# Patient Record
Sex: MALE | Race: WHITE | Employment: OTHER | URBAN - METROPOLITAN AREA
[De-identification: names, ages, dates, MRNs, and addresses within clinical notes are randomized per-mention and may not be internally consistent; named-entity substitution may affect disease eponyms.]

---

## 2017-08-14 ENCOUNTER — OFFICE VISIT (OUTPATIENT)
Dept: FAMILY MEDICINE CLINIC | Age: 72
End: 2017-08-14

## 2017-08-14 ENCOUNTER — HOSPITAL ENCOUNTER (OUTPATIENT)
Dept: LAB | Age: 72
Discharge: HOME | End: 2017-08-14
Payer: MEDICARE

## 2017-08-14 VITALS
SYSTOLIC BLOOD PRESSURE: 108 MMHG | OXYGEN SATURATION: 95 % | HEART RATE: 74 BPM | BODY MASS INDEX: 30.32 KG/M2 | TEMPERATURE: 98 F | HEIGHT: 76 IN | RESPIRATION RATE: 12 BRPM | DIASTOLIC BLOOD PRESSURE: 75 MMHG | WEIGHT: 249 LBS

## 2017-08-14 DIAGNOSIS — I50.22 CHRONIC SYSTOLIC CONGESTIVE HEART FAILURE (HCC): ICD-10-CM

## 2017-08-14 DIAGNOSIS — Z01.818 PRE-OP EXAM: Primary | ICD-10-CM

## 2017-08-14 DIAGNOSIS — I45.9 HEART BLOCK: ICD-10-CM

## 2017-08-14 DIAGNOSIS — Z95.0 PACEMAKER: ICD-10-CM

## 2017-08-14 DIAGNOSIS — I10 ESSENTIAL HYPERTENSION: ICD-10-CM

## 2017-08-14 PROCEDURE — 80053 COMPREHEN METABOLIC PANEL: CPT

## 2017-08-14 PROCEDURE — 85025 COMPLETE CBC W/AUTO DIFF WBC: CPT

## 2017-08-14 NOTE — PROGRESS NOTES
HPI  Teddy Langley is a 67 y.o. male who presents for preop exam.  Typically gets all of his medical care at the [place] but his primary care provider is out of town and he needs a preop examination for cataract surgery. The last time he was seen in the [facility] system was 2010 when he had a myocardial infarction that did not require stent placement. Since then he has been diagnosed with CHF with a ejection fraction of 35%. Has a heart block requiring a pacemaker. He has frequent follow-up with his PCP and cardiology. He has been given a good report although I do not have these records from the [place]. He has reasonably good exercise tolerance. He can walk around stores without difficulty. Can walk up a flight of steps without difficulty. Does not have chest pain or shortness of breath. His last surgery was a hernia repair 2-1/2 years ago. He tolerated anesthesia well    PMHx:  No past medical history on file. Meds:   Current Outpatient Prescriptions   Medication Sig Dispense Refill    metoprolol (LOPRESSOR) 50 mg tablet Take 50 mg by mouth two (2) times a day. Indications: MYOCARDIAL INFARCTION      lisinopril (PRINIVIL, ZESTRIL) 40 mg tablet Take 40 mg by mouth daily. Indications: MYOCARDIAL INFARCTION      niacin SR (NIASPAN) 500 mg tablet Take 2,000 mg by mouth two (2) times daily (with meals). Indications: HYPERLIPIDEMIA      aspirin (ASPIRIN) 325 mg tablet Take 750 mg by mouth daily. Indications: MYOCARDIAL INFARCTION      furosemide (LASIX) 40 mg tablet Take 40 mg by mouth daily. Indications: EDEMA         Allergies: Allergies   Allergen Reactions    Celery Other (comments)     Mouth burns    Erythromycin Other (comments)     cramping       Smoker:  History   Smoking Status    Not on file   Smokeless Tobacco    Not on file       ETOH:   History   Alcohol use Not on file       FH: No family history on file. ROS:   As listed in HPI.  In addition:  Constitutional:   No headache, fever, fatigue, weight loss or weight gain      Cardiac:    No chest pain      Resp:   No cough or shortness of breath      Neuro   No loss of consciousness, dizziness, seizures      Physical Exam:  Blood pressure 108/75, pulse 74, temperature 98 °F (36.7 °C), resp. rate 12, height 6' 4\" (1.93 m), weight 249 lb (112.9 kg), SpO2 95 %. GEN: No apparent distress. Alert and oriented and responds to all questions appropriately. NEUROLOGIC:  No focal neurologic deficits. Strength and sensation grossly intact. Coordination and gait grossly intact. EXT: Well perfused. No edema. SKIN: No obvious rashes. Lungs are clear to auscultation bilaterally  CV regular rate and rhythm no murmur no bruit  HEENT unremarkable         Assessment and Plan     Preop for cataract surgery  Do not have records from the [place] but patient is a reasonably good historian. Hypertension is well controlled    He has CHF but has good function  No recent cardiac events, no history of stent placement    He is capable of greater than 4 METs  Should be low risk for surgery    CBC, CMP reviewed. Labs are within normal limits. These are not fasting labs so blood sugar is acceptable      ICD-10-CM ICD-9-CM    1. Pre-op exam K18.964 Q98.31 METABOLIC PANEL, COMPREHENSIVE      CBC WITH AUTOMATED DIFF   2. Chronic systolic congestive heart failure (HCC) I50.22 428.22      428.0    3. Heart block I45.9 426.9    4. Pacemaker Z95.0 V45.01    5. Essential hypertension I10 401.9        AVS given.  Pt expressed understanding of instructions

## 2017-08-15 LAB
ALBUMIN SERPL-MCNC: 3.6 G/DL (ref 3.5–4.8)
ALBUMIN/GLOB SERPL: 1.2 {RATIO} (ref 1.2–2.2)
ALP SERPL-CCNC: 107 IU/L (ref 39–117)
ALT SERPL-CCNC: 31 IU/L (ref 0–44)
AST SERPL-CCNC: 28 IU/L (ref 0–40)
BASOPHILS # BLD AUTO: 0 X10E3/UL (ref 0–0.2)
BASOPHILS NFR BLD AUTO: 1 %
BILIRUB SERPL-MCNC: 0.7 MG/DL (ref 0–1.2)
BUN SERPL-MCNC: 16 MG/DL (ref 8–27)
BUN/CREAT SERPL: 17 (ref 10–24)
CALCIUM SERPL-MCNC: 9.3 MG/DL (ref 8.6–10.2)
CHLORIDE SERPL-SCNC: 102 MMOL/L (ref 96–106)
CO2 SERPL-SCNC: 28 MMOL/L (ref 18–29)
CREAT SERPL-MCNC: 0.92 MG/DL (ref 0.76–1.27)
EOSINOPHIL # BLD AUTO: 0.4 X10E3/UL (ref 0–0.4)
EOSINOPHIL NFR BLD AUTO: 6 %
ERYTHROCYTE [DISTWIDTH] IN BLOOD BY AUTOMATED COUNT: 13.5 % (ref 12.3–15.4)
GLOBULIN SER CALC-MCNC: 2.9 G/DL (ref 1.5–4.5)
GLUCOSE SERPL-MCNC: 117 MG/DL (ref 65–99)
HCT VFR BLD AUTO: 44.6 % (ref 37.5–51)
HGB BLD-MCNC: 15 G/DL (ref 12.6–17.7)
IMM GRANULOCYTES # BLD: 0 X10E3/UL (ref 0–0.1)
IMM GRANULOCYTES NFR BLD: 0 %
LYMPHOCYTES # BLD AUTO: 1.9 X10E3/UL (ref 0.7–3.1)
LYMPHOCYTES NFR BLD AUTO: 29 %
MCH RBC QN AUTO: 33.1 PG (ref 26.6–33)
MCHC RBC AUTO-ENTMCNC: 33.6 G/DL (ref 31.5–35.7)
MCV RBC AUTO: 99 FL (ref 79–97)
MONOCYTES # BLD AUTO: 0.5 X10E3/UL (ref 0.1–0.9)
MONOCYTES NFR BLD AUTO: 8 %
NEUTROPHILS # BLD AUTO: 3.6 X10E3/UL (ref 1.4–7)
NEUTROPHILS NFR BLD AUTO: 56 %
PLATELET # BLD AUTO: 178 X10E3/UL (ref 150–379)
POTASSIUM SERPL-SCNC: 4.4 MMOL/L (ref 3.5–5.2)
PROT SERPL-MCNC: 6.5 G/DL (ref 6–8.5)
RBC # BLD AUTO: 4.53 X10E6/UL (ref 4.14–5.8)
SODIUM SERPL-SCNC: 144 MMOL/L (ref 134–144)
WBC # BLD AUTO: 6.4 X10E3/UL (ref 3.4–10.8)

## 2021-03-01 ENCOUNTER — OFFICE VISIT (OUTPATIENT)
Dept: FAMILY MEDICINE CLINIC | Age: 76
End: 2021-03-01
Payer: MEDICARE

## 2021-03-01 VITALS
OXYGEN SATURATION: 94 % | HEART RATE: 70 BPM | BODY MASS INDEX: 27.54 KG/M2 | DIASTOLIC BLOOD PRESSURE: 64 MMHG | WEIGHT: 238 LBS | TEMPERATURE: 97.5 F | RESPIRATION RATE: 18 BRPM | SYSTOLIC BLOOD PRESSURE: 100 MMHG | HEIGHT: 78 IN

## 2021-03-01 DIAGNOSIS — N40.1 BENIGN PROSTATIC HYPERPLASIA WITH URINARY OBSTRUCTION: Primary | ICD-10-CM

## 2021-03-01 DIAGNOSIS — N13.8 BENIGN PROSTATIC HYPERPLASIA WITH URINARY OBSTRUCTION: Primary | ICD-10-CM

## 2021-03-01 DIAGNOSIS — G89.29 CHRONIC MIDLINE LOW BACK PAIN, UNSPECIFIED WHETHER SCIATICA PRESENT: ICD-10-CM

## 2021-03-01 DIAGNOSIS — M54.50 CHRONIC MIDLINE LOW BACK PAIN, UNSPECIFIED WHETHER SCIATICA PRESENT: ICD-10-CM

## 2021-03-01 DIAGNOSIS — R33.9 URINARY RETENTION: ICD-10-CM

## 2021-03-01 LAB
BILIRUB UR QL STRIP: NEGATIVE
GLUCOSE UR-MCNC: NEGATIVE MG/DL
KETONES P FAST UR STRIP-MCNC: NORMAL MG/DL
PH UR STRIP: 5.5 [PH] (ref 4.6–8)
PROT UR QL STRIP: NEGATIVE
SP GR UR STRIP: 1.02 (ref 1–1.03)
UA UROBILINOGEN AMB POC: NORMAL (ref 0.2–1)
URINALYSIS CLARITY POC: NORMAL
URINALYSIS COLOR POC: YELLOW
URINE BLOOD POC: NEGATIVE
URINE LEUKOCYTES POC: NORMAL
URINE NITRITES POC: POSITIVE

## 2021-03-01 PROCEDURE — 99203 OFFICE O/P NEW LOW 30 MIN: CPT | Performed by: FAMILY MEDICINE

## 2021-03-01 PROCEDURE — 81003 URINALYSIS AUTO W/O SCOPE: CPT | Performed by: FAMILY MEDICINE

## 2021-03-01 PROCEDURE — G0463 HOSPITAL OUTPT CLINIC VISIT: HCPCS | Performed by: FAMILY MEDICINE

## 2021-03-01 RX ORDER — GABAPENTIN 300 MG/1
300 CAPSULE ORAL DAILY
COMMUNITY
Start: 2021-03-01 | End: 2021-11-29

## 2021-03-01 RX ORDER — HYDROCODONE BITARTRATE AND ACETAMINOPHEN 5; 325 MG/1; MG/1
1 TABLET ORAL
Refills: 0 | COMMUNITY
Start: 2021-03-01 | End: 2021-12-02

## 2021-03-01 NOTE — PROGRESS NOTES
Julito Pena is a 68 y.o. male  Chief Complaint   Patient presents with    Follow-up    Bladder Infection     has staright cath, frequent infections     1. Have you been to the ER, urgent care clinic since your last visit? Hospitalized since your last visit?no    2. Have you seen or consulted any other health care providers outside of the [address] [place] [address] since your last visit? Include any pap smears or colon screening.  No  Visit Vitals  /64 (BP 1 Location: Right arm, BP Patient Position: At rest, BP Cuff Size: Adult)   Pulse 70   Temp 97.5 °F (36.4 °C) (Skin)   Resp 18   Ht 6' 6\" (1.981 m)   Wt 238 lb (108 kg)   SpO2 94%   BMI 27.50 kg/m²     Health Maintenance   Topic Date Due    Hepatitis C Screening  1945    COVID-19 Vaccine (1 of 2) 01/27/1961    DTaP/Tdap/Td series (1 - Tdap) 01/27/1966    Shingrix Vaccine Age 50> (1 of 2) 01/27/1995    GLAUCOMA SCREENING Q2Y  01/27/2010    Pneumococcal 65+ years (1 of 1 - PPSV23) 01/27/2010    Flu Vaccine (1) 09/01/2020    Medicare Yearly Exam  02/15/2021

## 2021-03-01 NOTE — PROGRESS NOTES
Chief Complaint   Patient presents with    Follow-up    Bladder Infection     has staright cath, frequent infections     Pt reports that he has chronic back pain, is on hydrocodone chronically. Pt brought medication list from the [place], was filled on 2/24/21    Pt has an ICD, pt has a 20% Ejection fraction. Pt gets 60 pills per month from Dr. Stephen Conklin. Pt uses a straight catheter, has intermittent urinary tract infections. Pt had a procedure to \"roto-rooter\" the prostate, pt reports that he has to cath due to enlarged prostate. Pt has been using a straight catheter for 3-4 years. Pt does not get any vaccines, declined COVID vaccine when it was offered by another provider. Catheter is: [address] [facility], Male, FR 14/4.7mm  Subjective: (As above and below)     Chief Complaint   Patient presents with    Follow-up    Bladder Infection     has staright cath, frequent infections     he is a 68y.o. year old male who presents for evaluation. Reviewed PmHx, RxHx, FmHx, SocHx, AllgHx and updated in chart. Review of Systems - negative except as listed above    Objective:     Vitals:    03/01/21 1434   BP: 100/64   Pulse: 70   Resp: 18   Temp: 97.5 °F (36.4 °C)   TempSrc: Skin   SpO2: 94%   Weight: 238 lb (108 kg)   Height: 6' 6\" (1.981 m)     Physical Examination: General appearance - alert, well appearing, and in no distress  Mental status - normal mood, behavior, speech, dress, motor activity, and thought processes  Mouth - mucous membranes moist, pharynx normal without lesions  Chest - clear to auscultation, no wheezes, rales or rhonchi, symmetric air entry  Heart - normal rate, regular rhythm, normal S1, S2, no murmurs, rubs, clicks or gallops  Musculoskeletal - walks with a cane  Extremities - peripheral pulses normal, no pedal edema, no clubbing or cyanosis    Assessment/ Plan:   1.  Benign prostatic hyperplasia with urinary obstruction  -refer to urology  - REFERRAL TO UROLOGY  - AMB POC URINALYSIS DIP STICK AUTO W/O MICRO    2. Chronic midline low back pain, unspecified whether sciatica present  -records requested from the [place]  -controlled substance agreement signed  -UDS ordered  -advised that medication will not be refilled until records are received, will require justification, may refer to pain management  - gabapentin (NEURONTIN) 300 mg capsule; Take 1 Cap by mouth daily. Max Daily Amount: 300 mg.  - HYDROcodone-acetaminophen (NORCO) 5-325 mg per tablet; Take 1 Tab by mouth two (2) times daily as needed for Pain. Max Daily Amount: 2 Tabs.; Refill: 0       I have discussed the diagnosis with the patient and the intended plan as seen in the above orders. The patient has received an after-visit summary and questions were answered concerning future plans.      Medication Side Effects and Warnings were discussed with patient: yes  Patient Labs were reviewed: yes  Patient Past Records were reviewed:  yes    Bassam Brennan M.D.

## 2021-03-04 LAB — BACTERIA UR CULT: ABNORMAL

## 2021-03-05 RX ORDER — AMOXICILLIN AND CLAVULANATE POTASSIUM 875; 125 MG/1; MG/1
1 TABLET, FILM COATED ORAL EVERY 12 HOURS
Qty: 20 TAB | Refills: 0 | OUTPATIENT
Start: 2021-03-05 | End: 2021-03-15

## 2021-03-05 NOTE — PROGRESS NOTES
Tried calling pt and wife states she was currently on multiple phone calls and she would call us back.  I let her know its really important to call us back

## 2021-03-05 NOTE — PROGRESS NOTES
Urine culture shows significant bacterial growth, please start on antibiotic as ordered. Please inform and confirm that pt would like his prescription to go to [facility], if so it will need to be called in.

## 2021-03-05 NOTE — PROGRESS NOTES
Pt notified and voiced understanding, pt would like medication called into the [facility] in [place]

## 2021-03-07 LAB — DRUGS UR: NORMAL

## 2021-03-08 NOTE — PROGRESS NOTES
Unexpected results. Tramadol not on  nor reported to provider. Drug screen failed, pain medication will not be provided by this office.

## 2021-03-24 DIAGNOSIS — G89.29 CHRONIC MIDLINE LOW BACK PAIN, UNSPECIFIED WHETHER SCIATICA PRESENT: ICD-10-CM

## 2021-03-24 DIAGNOSIS — M54.50 CHRONIC MIDLINE LOW BACK PAIN, UNSPECIFIED WHETHER SCIATICA PRESENT: ICD-10-CM

## 2021-03-24 RX ORDER — HYDROCODONE BITARTRATE AND ACETAMINOPHEN 5; 325 MG/1; MG/1
1 TABLET ORAL
Refills: 0 | OUTPATIENT
Start: 2021-03-24

## 2021-03-24 NOTE — TELEPHONE ENCOUNTER
Pt called for a refill of his pain meds. Please send to [address]. Requested Prescriptions     Pending Prescriptions Disp Refills    HYDROcodone-acetaminophen (NORCO) 5-325 mg per tablet   0     Sig: Take 1 Tab by mouth two (2) times daily as needed for Pain. Max Daily Amount: 2 Tabs.

## 2021-03-24 NOTE — LETTER
3/24/2021 Mr. Lore rFances [address] [facility] [address] [facility] [id number] Dear Mr. Chiqui Nunez good relationship between physician and patient is essential for quality medical care. The physician/patient relationship depends upon mutual trust, respect, and rapport. Our relationship has reached a point where these criteria are no longer intact. you have an abnormal drug screen  For this reasons, I will no longer continue to serve as your physician and [id number] [facility] [place] must withdraw from your medical care and treatment effective this date. Although I have terminated our physicianpatient relationship, I will assist you with the transition of your health care to other providers, as follows: 
 
1. On-Going/Acute Care. I will provide on-going/acute care for you for a period of 30 days, but in no case later than 4/23/21. After this date, you may seek care with another physician or your local emergency room. 2. Referrals for Future Medical Care. As you may need medical attention in the future, I recommend that you promptly find another physician to care for you. You may require ongoing medical attention for any current or future medical conditions, including but not limited to the following: Congestive Heart Failure, Hypertension, Chronic Pain. You may contact your insurance company or the [employer] for a [employer] [place] [employer] of physicians who are accepting new patients 3. Medical Records. I will provide you or your new health care provider with a copy of your records upon your request.  Since your records are confidential, Ill require your written authorization to make them available to another physician. Enclosed is an authorization form. Please complete it and return it to me so that we may transition your care. I extend to you my best wishes for your future health. Sincerely, Regis Gibbons MD 
 
 Enclosure

## 2021-03-24 NOTE — TELEPHONE ENCOUNTER
Received request for pain medication refill. Called and spoke with patient regarding urine drug screen that showed tramadol. Tramadol was not listed on his  nor disclose during visit. Patient reports that this was an old prescription that he was using due to being out of his other pain medication. Advised patient that we did not have a record of it being prescribed in the past 12 months,  also shows that patient has been regularly receiving hydrocodone at 1 month intervals without any visible interruption. Advised patient that we would not be able to provide controlled substance at this time, patient expressed unhappiness with this decision, reports that he will speak with his provider at the [place] who has been currently prescribing his medications. I agreed with this decision.     Dismissal letter written

## 2021-11-29 ENCOUNTER — APPOINTMENT (OUTPATIENT)
Dept: GENERAL RADIOLOGY | Age: 76
DRG: 522 | End: 2021-11-29
Attending: EMERGENCY MEDICINE
Payer: OTHER GOVERNMENT

## 2021-11-29 ENCOUNTER — APPOINTMENT (OUTPATIENT)
Dept: CT IMAGING | Age: 76
DRG: 522 | End: 2021-11-29
Attending: EMERGENCY MEDICINE
Payer: OTHER GOVERNMENT

## 2021-11-29 ENCOUNTER — HOSPITAL ENCOUNTER (INPATIENT)
Age: 76
LOS: 3 days | Discharge: HOME | DRG: 522 | End: 2021-12-02
Attending: EMERGENCY MEDICINE | Admitting: INTERNAL MEDICINE
Payer: OTHER GOVERNMENT

## 2021-11-29 DIAGNOSIS — S72.002A CLOSED FRACTURE OF NECK OF LEFT FEMUR, INITIAL ENCOUNTER (HCC): Primary | ICD-10-CM

## 2021-11-29 PROBLEM — S72.009A HIP FRACTURE (HCC): Status: ACTIVE | Noted: 2021-11-29

## 2021-11-29 LAB
ABO + RH BLD: NORMAL
ANION GAP SERPL CALC-SCNC: 5 MMOL/L (ref 5–15)
APPEARANCE UR: ABNORMAL
BACTERIA URNS QL MICRO: ABNORMAL /HPF
BASOPHILS # BLD: 0.1 K/UL (ref 0–0.1)
BASOPHILS NFR BLD: 1 % (ref 0–1)
BILIRUB UR QL: NEGATIVE
BLOOD GROUP ANTIBODIES SERPL: NORMAL
BUN SERPL-MCNC: 21 MG/DL (ref 6–20)
BUN/CREAT SERPL: 20 (ref 12–20)
CALCIUM SERPL-MCNC: 9 MG/DL (ref 8.5–10.1)
CHLORIDE SERPL-SCNC: 104 MMOL/L (ref 97–108)
CO2 SERPL-SCNC: 29 MMOL/L (ref 21–32)
COLOR UR: ABNORMAL
CREAT SERPL-MCNC: 1.04 MG/DL (ref 0.7–1.3)
DIFFERENTIAL METHOD BLD: ABNORMAL
EOSINOPHIL # BLD: 0.4 K/UL (ref 0–0.4)
EOSINOPHIL NFR BLD: 4 % (ref 0–7)
EPITH CASTS URNS QL MICRO: ABNORMAL /LPF
ERYTHROCYTE [DISTWIDTH] IN BLOOD BY AUTOMATED COUNT: 13.9 % (ref 11.5–14.5)
GLUCOSE SERPL-MCNC: 111 MG/DL (ref 65–100)
GLUCOSE UR STRIP.AUTO-MCNC: NEGATIVE MG/DL
HCT VFR BLD AUTO: 40.3 % (ref 36.6–50.3)
HGB BLD-MCNC: 13.3 G/DL (ref 12.1–17)
HGB UR QL STRIP: NEGATIVE
IMM GRANULOCYTES # BLD AUTO: 0 K/UL (ref 0–0.04)
IMM GRANULOCYTES NFR BLD AUTO: 0 % (ref 0–0.5)
KETONES UR QL STRIP.AUTO: ABNORMAL MG/DL
LEUKOCYTE ESTERASE UR QL STRIP.AUTO: ABNORMAL
LYMPHOCYTES # BLD: 1.1 K/UL (ref 0.8–3.5)
LYMPHOCYTES NFR BLD: 12 % (ref 12–49)
MCH RBC QN AUTO: 32 PG (ref 26–34)
MCHC RBC AUTO-ENTMCNC: 33 G/DL (ref 30–36.5)
MCV RBC AUTO: 96.9 FL (ref 80–99)
MONOCYTES # BLD: 1.2 K/UL (ref 0–1)
MONOCYTES NFR BLD: 14 % (ref 5–13)
NEUTS SEG # BLD: 6.1 K/UL (ref 1.8–8)
NEUTS SEG NFR BLD: 69 % (ref 32–75)
NITRITE UR QL STRIP.AUTO: POSITIVE
NRBC # BLD: 0 K/UL (ref 0–0.01)
NRBC BLD-RTO: 0 PER 100 WBC
PH UR STRIP: 5.5 [PH] (ref 5–8)
PLATELET # BLD AUTO: 168 K/UL (ref 150–400)
PMV BLD AUTO: 10.7 FL (ref 8.9–12.9)
POTASSIUM SERPL-SCNC: 3.4 MMOL/L (ref 3.5–5.1)
PROT UR STRIP-MCNC: 100 MG/DL
RBC # BLD AUTO: 4.16 M/UL (ref 4.1–5.7)
RBC #/AREA URNS HPF: ABNORMAL /HPF (ref 0–5)
SODIUM SERPL-SCNC: 138 MMOL/L (ref 136–145)
SP GR UR REFRACTOMETRY: >1.03 (ref 1–1.03)
SPECIMEN EXP DATE BLD: NORMAL
UA: UC IF INDICATED,UAUC: ABNORMAL
UROBILINOGEN UR QL STRIP.AUTO: 1 EU/DL (ref 0.2–1)
WBC # BLD AUTO: 8.9 K/UL (ref 4.1–11.1)
WBC URNS QL MICRO: ABNORMAL /HPF (ref 0–4)

## 2021-11-29 PROCEDURE — 73502 X-RAY EXAM HIP UNI 2-3 VIEWS: CPT

## 2021-11-29 PROCEDURE — 74011250637 HC RX REV CODE- 250/637: Performed by: EMERGENCY MEDICINE

## 2021-11-29 PROCEDURE — 99285 EMERGENCY DEPT VISIT HI MDM: CPT

## 2021-11-29 PROCEDURE — 80048 BASIC METABOLIC PNL TOTAL CA: CPT

## 2021-11-29 PROCEDURE — 87186 SC STD MICRODIL/AGAR DIL: CPT

## 2021-11-29 PROCEDURE — 74011250636 HC RX REV CODE- 250/636: Performed by: PHYSICIAN ASSISTANT

## 2021-11-29 PROCEDURE — 71101 X-RAY EXAM UNILAT RIBS/CHEST: CPT

## 2021-11-29 PROCEDURE — 85025 COMPLETE CBC W/AUTO DIFF WBC: CPT

## 2021-11-29 PROCEDURE — 36415 COLL VENOUS BLD VENIPUNCTURE: CPT

## 2021-11-29 PROCEDURE — 86900 BLOOD TYPING SEROLOGIC ABO: CPT

## 2021-11-29 PROCEDURE — 74011250636 HC RX REV CODE- 250/636: Performed by: EMERGENCY MEDICINE

## 2021-11-29 PROCEDURE — 87086 URINE CULTURE/COLONY COUNT: CPT

## 2021-11-29 PROCEDURE — 74011250637 HC RX REV CODE- 250/637: Performed by: INTERNAL MEDICINE

## 2021-11-29 PROCEDURE — 72128 CT CHEST SPINE W/O DYE: CPT

## 2021-11-29 PROCEDURE — 87077 CULTURE AEROBIC IDENTIFY: CPT

## 2021-11-29 PROCEDURE — 81001 URINALYSIS AUTO W/SCOPE: CPT

## 2021-11-29 PROCEDURE — 0SRB0JZ REPLACEMENT OF LEFT HIP JOINT WITH SYNTHETIC SUBSTITUTE, OPEN APPROACH: ICD-10-PCS | Performed by: ORTHOPAEDIC SURGERY

## 2021-11-29 PROCEDURE — 65660000000 HC RM CCU STEPDOWN

## 2021-11-29 PROCEDURE — 72100 X-RAY EXAM L-S SPINE 2/3 VWS: CPT

## 2021-11-29 PROCEDURE — 93005 ELECTROCARDIOGRAM TRACING: CPT

## 2021-11-29 PROCEDURE — 71100 X-RAY EXAM RIBS UNI 2 VIEWS: CPT

## 2021-11-29 PROCEDURE — 74011250637 HC RX REV CODE- 250/637: Performed by: PHYSICIAN ASSISTANT

## 2021-11-29 RX ORDER — MORPHINE SULFATE 2 MG/ML
4 INJECTION, SOLUTION INTRAMUSCULAR; INTRAVENOUS
Status: COMPLETED | OUTPATIENT
Start: 2021-11-29 | End: 2021-11-29

## 2021-11-29 RX ORDER — ACETAMINOPHEN 500 MG
1000 TABLET ORAL ONCE
Status: COMPLETED | OUTPATIENT
Start: 2021-11-29 | End: 2021-11-29

## 2021-11-29 RX ORDER — ONDANSETRON 4 MG/1
4 TABLET, ORALLY DISINTEGRATING ORAL
Status: DISCONTINUED | OUTPATIENT
Start: 2021-11-29 | End: 2021-12-01 | Stop reason: SDUPTHER

## 2021-11-29 RX ORDER — HYDROMORPHONE HYDROCHLORIDE 1 MG/ML
.5-1 INJECTION, SOLUTION INTRAMUSCULAR; INTRAVENOUS; SUBCUTANEOUS
Status: DISCONTINUED | OUTPATIENT
Start: 2021-11-29 | End: 2021-12-02 | Stop reason: HOSPADM

## 2021-11-29 RX ORDER — ONDANSETRON 2 MG/ML
4 INJECTION INTRAMUSCULAR; INTRAVENOUS
Status: DISCONTINUED | OUTPATIENT
Start: 2021-11-29 | End: 2021-12-02 | Stop reason: HOSPADM

## 2021-11-29 RX ORDER — POTASSIUM CHLORIDE 750 MG/1
10 TABLET, FILM COATED, EXTENDED RELEASE ORAL DAILY
COMMUNITY

## 2021-11-29 RX ORDER — SACUBITRIL AND VALSARTAN 24; 26 MG/1; MG/1
1 TABLET, FILM COATED ORAL 2 TIMES DAILY
COMMUNITY

## 2021-11-29 RX ORDER — OXYCODONE HYDROCHLORIDE 5 MG/1
5 TABLET ORAL
Status: DISCONTINUED | OUTPATIENT
Start: 2021-11-29 | End: 2021-12-02 | Stop reason: HOSPADM

## 2021-11-29 RX ORDER — GABAPENTIN 300 MG/1
300 CAPSULE ORAL DAILY
Status: CANCELLED | OUTPATIENT
Start: 2021-11-30

## 2021-11-29 RX ORDER — ACETAMINOPHEN 325 MG/1
650 TABLET ORAL
Status: DISCONTINUED | OUTPATIENT
Start: 2021-11-29 | End: 2021-11-29 | Stop reason: SDUPTHER

## 2021-11-29 RX ORDER — ACETAMINOPHEN 650 MG/1
650 SUPPOSITORY RECTAL
Status: DISCONTINUED | OUTPATIENT
Start: 2021-11-29 | End: 2021-12-02 | Stop reason: HOSPADM

## 2021-11-29 RX ORDER — POLYETHYLENE GLYCOL 3350 17 G/17G
17 POWDER, FOR SOLUTION ORAL DAILY PRN
Status: DISCONTINUED | OUTPATIENT
Start: 2021-11-29 | End: 2021-12-02 | Stop reason: HOSPADM

## 2021-11-29 RX ORDER — NALOXONE HYDROCHLORIDE 0.4 MG/ML
0.4 INJECTION, SOLUTION INTRAMUSCULAR; INTRAVENOUS; SUBCUTANEOUS AS NEEDED
Status: DISCONTINUED | OUTPATIENT
Start: 2021-11-29 | End: 2021-11-30

## 2021-11-29 RX ORDER — FUROSEMIDE 40 MG/1
60 TABLET ORAL DAILY
COMMUNITY

## 2021-11-29 RX ORDER — POTASSIUM CHLORIDE 20 MEQ/1
40 TABLET, EXTENDED RELEASE ORAL
Status: COMPLETED | OUTPATIENT
Start: 2021-11-29 | End: 2021-11-29

## 2021-11-29 RX ORDER — ACETAMINOPHEN 325 MG/1
650 TABLET ORAL
Status: DISCONTINUED | OUTPATIENT
Start: 2021-11-29 | End: 2021-12-02 | Stop reason: HOSPADM

## 2021-11-29 RX ORDER — AMOXICILLIN 250 MG
2 CAPSULE ORAL 2 TIMES DAILY
Status: DISCONTINUED | OUTPATIENT
Start: 2021-11-29 | End: 2021-11-30

## 2021-11-29 RX ORDER — METOPROLOL TARTRATE 50 MG/1
50 TABLET ORAL 2 TIMES DAILY
Status: CANCELLED | OUTPATIENT
Start: 2021-11-29

## 2021-11-29 RX ORDER — SODIUM CHLORIDE 0.9 % (FLUSH) 0.9 %
5-40 SYRINGE (ML) INJECTION AS NEEDED
Status: DISCONTINUED | OUTPATIENT
Start: 2021-11-29 | End: 2021-11-30 | Stop reason: SDUPTHER

## 2021-11-29 RX ORDER — DULOXETIN HYDROCHLORIDE 30 MG/1
30 CAPSULE, DELAYED RELEASE ORAL DAILY
COMMUNITY

## 2021-11-29 RX ORDER — CETIRIZINE HCL 10 MG
10 TABLET ORAL
COMMUNITY

## 2021-11-29 RX ORDER — METOPROLOL SUCCINATE 25 MG/1
25 TABLET, EXTENDED RELEASE ORAL DAILY
COMMUNITY

## 2021-11-29 RX ORDER — MORPHINE SULFATE 2 MG/ML
4 INJECTION, SOLUTION INTRAMUSCULAR; INTRAVENOUS ONCE
Status: COMPLETED | OUTPATIENT
Start: 2021-11-29 | End: 2021-11-29

## 2021-11-29 RX ORDER — OXYCODONE HYDROCHLORIDE 5 MG/1
2.5 TABLET ORAL
Status: DISCONTINUED | OUTPATIENT
Start: 2021-11-29 | End: 2021-12-02 | Stop reason: HOSPADM

## 2021-11-29 RX ORDER — FACIAL-BODY WIPES
1250 EACH TOPICAL 4 TIMES DAILY
COMMUNITY

## 2021-11-29 RX ORDER — ACETAMINOPHEN 325 MG/1
650 TABLET ORAL EVERY 6 HOURS
Status: DISCONTINUED | OUTPATIENT
Start: 2021-11-29 | End: 2021-11-29

## 2021-11-29 RX ORDER — NIACIN 750 MG/1
1500 TABLET, EXTENDED RELEASE ORAL
COMMUNITY

## 2021-11-29 RX ORDER — FUROSEMIDE 40 MG/1
40 TABLET ORAL DAILY
Status: CANCELLED | OUTPATIENT
Start: 2021-11-30

## 2021-11-29 RX ORDER — ACETAMINOPHEN 325 MG/1
650 TABLET ORAL EVERY 6 HOURS
Status: DISCONTINUED | OUTPATIENT
Start: 2021-11-29 | End: 2021-11-30

## 2021-11-29 RX ORDER — SODIUM CHLORIDE 0.9 % (FLUSH) 0.9 %
5-40 SYRINGE (ML) INJECTION EVERY 8 HOURS
Status: DISCONTINUED | OUTPATIENT
Start: 2021-11-29 | End: 2021-11-30 | Stop reason: SDUPTHER

## 2021-11-29 RX ADMIN — Medication 10 ML: at 23:50

## 2021-11-29 RX ADMIN — MORPHINE SULFATE 4 MG: 2 INJECTION, SOLUTION INTRAMUSCULAR; INTRAVENOUS at 23:52

## 2021-11-29 RX ADMIN — ACETAMINOPHEN 1000 MG: 500 TABLET ORAL at 12:21

## 2021-11-29 RX ADMIN — MORPHINE SULFATE 4 MG: 2 INJECTION, SOLUTION INTRAMUSCULAR; INTRAVENOUS at 15:47

## 2021-11-29 RX ADMIN — ACETAMINOPHEN 650 MG: 325 TABLET ORAL at 23:50

## 2021-11-29 RX ADMIN — MORPHINE SULFATE 4 MG: 2 INJECTION, SOLUTION INTRAMUSCULAR; INTRAVENOUS at 20:06

## 2021-11-29 RX ADMIN — OXYCODONE 5 MG: 5 TABLET ORAL at 22:31

## 2021-11-29 RX ADMIN — POTASSIUM CHLORIDE 40 MEQ: 20 TABLET, EXTENDED RELEASE ORAL at 18:21

## 2021-11-29 RX ADMIN — DOCUSATE SODIUM 50MG AND SENNOSIDES 8.6MG 2 TABLET: 8.6; 5 TABLET, FILM COATED ORAL at 20:06

## 2021-11-29 RX ADMIN — ACETAMINOPHEN 650 MG: 325 TABLET ORAL at 18:21

## 2021-11-29 RX ADMIN — HYDROMORPHONE HYDROCHLORIDE 0.5 MG: 1 INJECTION, SOLUTION INTRAMUSCULAR; INTRAVENOUS; SUBCUTANEOUS at 18:24

## 2021-11-29 NOTE — H&P
Hospitalist Admission Note    NAME: Mamie Acharya   :  1945   MRN:  [id number]     Date/Time:  2021 3:18 PM    Patient PCP: Amarilis Vazquez MD  ______________________________________________________________________  Given the patient's current clinical presentation, I have a high level of concern for decompensation if discharged from the emergency department. Complex decision making was performed, which includes reviewing the patient's available past medical records, laboratory results, and x-ray films. My assessment of this patient's clinical condition and my plan of care is as follows. Assessment / Plan:  Hip fracture from GLF  Pt with hx of systolic HF but is currently euvolemic. No hx of stent placement. No recent admission for heart failure. He has no hx of diabetes or renal failure. Awaiting on EKG and labs. If EKG without ischemic changes Pre-op cardiac risk assessment:  Pt evaluated using revised cardiac risk index and is felt to be lowcardiovascular risk for intermediate risk surgery with a 0.9% risk for major complications based on these criteria. This risk has been discussed with the patient and pt wishes to proceed. Plan for surgery without further cardiac testing if this risk is acceptable per surgery and anesthesia. Further risk reduction will involve medical management of other comorbid conditions in the perioperative period. Pain management and DVT prophylaxis as per ortho team  Addendum:  EKG showed AV dual-paced rhythm. Hx of chronic systolic HF, EF 10% s//p AICD  Hx of congenital heart block s/p PM.    HTN  No recent cardiac events, no hx of stent placement. No recent admission for heart failure. Currently euvolemic. Cont' home medications. Monitor fluid status. Pt follows at the [address] to help with med rec    L 5th rib fracture  Nondisplaced. Aggressive pulmonary toilet. Pain control.     O2 suppl prn      Code Status: Full   Surrogate Decision Maker: pt's wife  DVT Prophylaxis: SCD for now  GI Prophylaxis: not indicated  Baseline:       Subjective:   CHIEF COMPLAINT:  L hip pain     HISTORY OF PRESENT ILLNESS:     Seymour Nyhan is a 68 y.o.  male with PMHx significant for  Pt was trying to get out of his truck, said he had sciatic nerve pain that caused him to missed a step when he tried to get out of the truck. Pt took some pain meds at home that night, however when he woke up in the AM, he couldn't move due to severe pain. Wife decided to call for help. On arrival, pt was found to have L hip fracture and L 5th rib fracture. He only complains of L hip pain. Denies any sob or pain at L chest.  No fever, chills, cp or sob. Vitals/labs/imaging reviewed. We were asked to admit for work up and evaluation of the above problems. Past Medical History:   Diagnosis Date    Chronic systolic congestive heart failure ([facility] [address] 8/14/2017    Essential hypertension 8/14/2017    Heart block 8/14/2017    Pacemaker 8/14/2017        Past Surgical History:   Procedure Laterality Date    HX BLADDER REPAIR      HX CORONARY STENT PLACEMENT      HX PACEMAKER         Social History     Tobacco Use    Smoking status: Never Smoker    Smokeless tobacco: Not on file   Substance Use Topics    Alcohol use: Never        History reviewed. No pertinent family history. Allergies   Allergen Reactions    Celery Other (comments)     Mouth burns    Erythromycin Other (comments)     cramping    Statins-Hmg-Coa Reductase Inhibitors Myalgia        Prior to Admission medications    Medication Sig Start Date End Date Taking? Authorizing Provider   gabapentin (NEURONTIN) 300 mg capsule Take 1 Cap by mouth daily. Max Daily Amount: 300 mg. 3/1/21   Foster López MD   HYDROcodone-acetaminophen (NORCO) 5-325 mg per tablet Take 1 Tab by mouth two (2) times daily as needed for Pain. Max Daily Amount: 2 Tabs.  3/1/21   Lela López David Lee MD   OTHER Please provide patient with catheters listed below: Catheter is: [address] [facility], Male, FR 14/4.7mm. Please fax to Silvia Mckeon 3/1/21   Steven López MD   metoprolol (LOPRESSOR) 50 mg tablet Take 50 mg by mouth two (2) times a day. Indications: MYOCARDIAL INFARCTION    Provider, Historical   niacin SR (NIASPAN) 500 mg tablet Take 2,000 mg by mouth two (2) times daily (with meals). Indications: HYPERLIPIDEMIA    Provider, Historical   aspirin (ASPIRIN) 325 mg tablet Take 750 mg by mouth daily. Indications: MYOCARDIAL INFARCTION    Provider, Historical   furosemide (LASIX) 40 mg tablet Take 40 mg by mouth daily. Indications: EDEMA    Provider, Historical       REVIEW OF SYSTEMS:     I am not able to complete the review of systems because:    The patient is intubated and sedated    The patient has altered mental status due to his acute medical problems    The patient has baseline aphasia from prior stroke(s)    The patient has baseline dementia and is not reliable historian    The patient is in acute medical distress and unable to provide information           Total of 12 systems reviewed as follows:       POSITIVE= BOLD text  Negative = text not BOLD  General:  fever, chills, sweats, generalized weakness, weight loss/gain,      loss of appetite   Eyes:    blurred vision, eye pain, loss of vision, double vision  ENT:    rhinorrhea, pharyngitis   Respiratory:   cough, sputum production, SOB, SOLARES, wheezing, pleuritic pain   Cardiology:   chest pain, palpitations, orthopnea, PND, edema, syncope   Gastrointestinal:  abdominal pain , N/V, diarrhea, dysphagia, constipation, bleeding   Genitourinary:  frequency, urgency, dysuria, hematuria, incontinence   Muskuloskeletal :  arthralgia, myalgia, back pain  Hematology:  easy bruising, nose or gum bleeding, lymphadenopathy   Dermatological: rash, ulceration, pruritis, color change / jaundice  Endocrine:   hot flashes or polydipsia Neurological:  headache, dizziness, confusion, focal weakness, paresthesia,     Speech difficulties, memory loss, gait difficulty  Psychological: Feelings of anxiety, depression, agitation    Objective:   VITALS:    Visit Vitals  /64 (BP 1 Location: Right arm, BP Patient Position: At rest)   Pulse 72   Temp 98.6 °F (37 °C)   Resp 16   Ht 6' 6\" (1.981 m)   Wt 103.4 kg (228 lb)   SpO2 99%   BMI 26.35 kg/m²       PHYSICAL EXAM:    General:    Alert, cooperative, no distress, appears stated age. HEENT: Atraumatic, anicteric sclerae, pink conjunctivae     No oral ulcers, mucosa moist, throat clear  Neck:  Supple, symmetrical,  thyroid: non tender  Lungs:   CTA b/l. No wheezing or Rhonchi. No rales. Chest wall:  No tenderness. No accessory muscle use. Heart:   Regular  rhythm,  No  Murmur. No edema  Abdomen:   Soft, NT. ND  BS+  Extremities: No cyanosis. No clubbing,      Skin turgor normal, Radial dial pulse 2+. Capillary refill normal  Skin:     Not pale. Not Jaundiced  No rashes   Psych:  Not depressed. Not anxious or agitated. Neurologic: No facial asymmetry. No aphasia or slurred speech. Symmetrical strength, Sensation grossly intact.  AAOx4.     _______________________________________________________________________  Care Plan discussed with:    Comments   Patient x    Family  x wife   RN x    Care Manager                    Consultant:      _______________________________________________________________________  Expected  Disposition:   Home with Family    HH/PT/OT/RN    SNF/LTC    RENZO x   ________________________________________________________________________  TOTAL TIME:  72 Minutes    Critical Care Provided     Minutes non procedure based      Comments    x Reviewed previous records   >50% of visit spent in counseling and coordination of care x Discussion with patient and/or family and questions answered ________________________________________________________________________  Signed: Lexa Desouza MD    Procedures: see electronic medical records for all procedures/Xrays and details which were not copied into this note but were reviewed prior to creation of Plan. LAB DATA REVIEWED:    No results found for this or any previous visit (from the past 24 hour(s)).

## 2021-11-29 NOTE — PROGRESS NOTES
Pharmacy Medication Reconciliation     The patient was interviewed regarding current PTA medication list, use and drug allergies;  patient present in room and obtained permission from patient to discuss drug regimen with visitor(s) present. The patient was questioned regarding use of any other inhalers, topical products, over the counter medications, herbal medications, vitamin products or ophthalmic/nasal/otic medication use. Allergy Update: Celery, Erythromycin, and Statins-hmg-coa reductase inhibitors    Recommendations/Findings: The following amendments were made to the patient's active medication list on file at [facility] [facility]:   1) Additions:   +all meds below  2) Deletions:   -Duplicate and wrong sigs/doses  3) Changes: none  Pertinent Findings: none    Clarified PTA med list with rx query, patient interview, medication list from [address] [facility] -from patient's phone. PTA medication list was corrected to the following:     Prior to Admission Medications   Prescriptions Last Dose Informant Taking? DULoxetine (CYMBALTA) 30 mg capsule 11/29/2021 at 0900 Self Yes   Sig: Take 30 mg by mouth daily. HYDROcodone-acetaminophen (NORCO) 5-325 mg per tablet  Self Yes   Sig: Take 1 Tab by mouth two (2) times daily as needed for Pain. Max Daily Amount: 2 Tabs. OTHER  Self No   Sig: Please provide patient with catheters listed below: Catheter is: [facility], Male, FR 14/4.7mm. Please fax to Yariel Campbell   aspirin (ASPIRIN) 325 mg tablet  Self Yes   Sig: Take 325 mg by mouth daily. Indications: a heart attack   calcium polycarbophiL (Fiber Laxative, ca polycarbo,) 625 mg tablet 11/29/2021 at Unknown time Self Yes   Sig: Take 1,250 mg by mouth four (4) times daily. cetirizine (ZYRTEC) 10 mg tablet 11/28/2021 at 2100 Self Yes   Sig: Take 10 mg by mouth nightly. metoprolol succinate (Toprol XL) 25 mg XL tablet 11/29/2021 at Unknown time Self Yes   Sig: Take 25 mg by mouth daily.    niacin ER (NIASPAN) 750 mg Tb24 11/28/2021 at 2100 Self Yes   Sig: Take 1,500 mg by mouth nightly. potassium chloride SR (KLOR-CON 10) 10 mEq tablet 11/29/2021 at Unknown time Self Yes   Sig: Take 10 mEq by mouth daily. sacubitriL-valsartan (Entresto) 24-26 mg tablet 11/29/2021 at 0900 Self Yes   Sig: Take 1 Tablet by mouth two (2) times a day.       Facility-Administered Medications: None        Thank you,  JESSY Caicedo

## 2021-11-29 NOTE — ED NOTES
Patient arrives to ED via EMS with a cc of GLF. Pt states his left hip gave out when getting out of the truck last night and is not complaining of 10/10 left hip pain. Pt states he did not hit his head. Pt is alert and oriented x 4, resting comfortably in stretcher with side rails up and call bell within reach.

## 2021-11-29 NOTE — ED PROVIDER NOTES
EMERGENCY DEPARTMENT HISTORY AND PHYSICAL EXAM      Date: 11/29/2021  Patient Name: Aida Echeverria  Patient Age and Sex: 68 y.o. male     History of Presenting Illness     Chief Complaint   Patient presents with    Fall       History Provided By: Patient    HPI: Aida Echeverria is a 60-year-old history CHF with pacer defibrillator presenting with fall. Patient reports he was getting out of his truck when his left leg gave out and he fell to the ground landing on his left hip. He has had progressive moderate to severe left hip pain worse with moving the left hip. It radiates to his lower back and down his leg. He takes 325 of aspirin a day, denies any other blood thinners. He reports he did not strike his head or lose consciousness with the fall. He does have mild left-sided rib pain following a fall. He also reports lower back pain. There are no other complaints, changes, or physical findings at this time. PCP: Mere López MD    No current facility-administered medications on file prior to encounter. Current Outpatient Medications on File Prior to Encounter   Medication Sig Dispense Refill    metoprolol succinate (Toprol XL) 25 mg XL tablet Take 25 mg by mouth daily.  niacin ER (NIASPAN) 750 mg Tb24 Take 1,500 mg by mouth nightly.  potassium chloride SR (KLOR-CON 10) 10 mEq tablet Take 10 mEq by mouth daily.  cetirizine (ZYRTEC) 10 mg tablet Take 10 mg by mouth nightly.  DULoxetine (CYMBALTA) 30 mg capsule Take 30 mg by mouth daily.  calcium polycarbophiL (Fiber Laxative, ca polycarbo,) 625 mg tablet Take 1,250 mg by mouth four (4) times daily.  sacubitriL-valsartan (Entresto) 24-26 mg tablet Take 1 Tablet by mouth two (2) times a day.  HYDROcodone-acetaminophen (NORCO) 5-325 mg per tablet Take 1 Tab by mouth two (2) times daily as needed for Pain. Max Daily Amount: 2 Tabs.  0    aspirin (ASPIRIN) 325 mg tablet Take 325 mg by mouth daily. Indications: a heart attack      OTHER Please provide patient with catheters listed below: Catheter is: [address]ines, Male, FR 14/4.7mm. Please fax to [employer] 120 Each 0    [DISCONTINUED] gabapentin (NEURONTIN) 300 mg capsule Take 1 Cap by mouth daily. Max Daily Amount: 300 mg.      [DISCONTINUED] metoprolol (LOPRESSOR) 50 mg tablet Take 50 mg by mouth two (2) times a day. Indications: MYOCARDIAL INFARCTION      [DISCONTINUED] niacin SR (NIASPAN) 500 mg tablet Take 2,000 mg by mouth two (2) times daily (with meals). Indications: HYPERLIPIDEMIA      [DISCONTINUED] furosemide (LASIX) 40 mg tablet Take 40 mg by mouth daily. Indications: EDEMA         Past History     Past Medical History:  Past Medical History:   Diagnosis Date    Chronic systolic congestive heart failure ([facility] [place] [address] 8/14/2017    Essential hypertension 8/14/2017    Heart block 8/14/2017    Pacemaker 8/14/2017       Past Surgical History:  Past Surgical History:   Procedure Laterality Date    HX BLADDER REPAIR      HX CORONARY STENT PLACEMENT      HX PACEMAKER         Family History:  History reviewed. No pertinent family history. Social History:  Social History     Tobacco Use    Smoking status: Never Smoker    Smokeless tobacco: Not on file   Substance Use Topics    Alcohol use: Never    Drug use: Never       Allergies: Allergies   Allergen Reactions    Celery Other (comments)     Mouth burns    Erythromycin Other (comments)     cramping    Statins-Hmg-Coa Reductase Inhibitors Myalgia         Review of Systems   Review of Systems   Cardiovascular: Positive for chest pain. Musculoskeletal: Positive for arthralgias and back pain. All other systems reviewed and are negative. Physical Exam   Physical Exam  Vitals and nursing note reviewed. Constitutional:       Appearance: Normal appearance. HENT:      Head: Normocephalic and atraumatic. Mouth/Throat:      Mouth: Mucous membranes are dry.    Eyes: Conjunctiva/sclera: Conjunctivae normal.   Cardiovascular:      Rate and Rhythm: Normal rate and regular rhythm. Pulses: Normal pulses. Pulmonary:      Effort: Pulmonary effort is normal.   Abdominal:      General: Abdomen is flat. Palpations: Abdomen is soft. Musculoskeletal:      Comments: No deformity to left lower leg. Significant pain with any range of motion of left hip. 2+ DP and PT pulses. 5 out of 5 strength with dorsiflexion plantarflexion EHL extension. Significant pain limiting knee flexion however knee extension is intact. No C, T, L-spine tenderness palpation. Skin:     General: Skin is warm and dry. Capillary Refill: Capillary refill takes less than 2 seconds. Neurological:      Mental Status: He is alert. Psychiatric:         Behavior: Behavior normal.         Thought Content: Thought content normal.          Diagnostic Study Results     Labs -     Recent Results (from the past 12 hour(s))   TYPE & SCREEN    Collection Time: 11/29/21  3:53 PM   Result Value Ref Range    Crossmatch Expiration 12/02/2021,2359     ABO/Rh(D) Douglas Acevedoet POSITIVE     Antibody screen NEG    CBC WITH AUTOMATED DIFF    Collection Time: 11/29/21  3:54 PM   Result Value Ref Range    WBC 8.9 4.1 - 11.1 K/uL    RBC 4.16 4.10 - 5.70 M/uL    HGB 13.3 12.1 - 17.0 g/dL    HCT 40.3 36.6 - 50.3 %    MCV 96.9 80.0 - 99.0 FL    MCH 32.0 26.0 - 34.0 PG    MCHC 33.0 30.0 - 36.5 g/dL    RDW 13.9 11.5 - 14.5 %    PLATELET 697 318 - 349 K/uL    MPV 10.7 8.9 - 12.9 FL    NRBC 0.0 0  WBC    ABSOLUTE NRBC 0.00 0.00 - 0.01 K/uL    NEUTROPHILS 69 32 - 75 %    LYMPHOCYTES 12 12 - 49 %    MONOCYTES 14 (H) 5 - 13 %    EOSINOPHILS 4 0 - 7 %    BASOPHILS 1 0 - 1 %    IMMATURE GRANULOCYTES 0 0.0 - 0.5 %    ABS. NEUTROPHILS 6.1 1.8 - 8.0 K/UL    ABS. LYMPHOCYTES 1.1 0.8 - 3.5 K/UL    ABS. MONOCYTES 1.2 (H) 0.0 - 1.0 K/UL    ABS. EOSINOPHILS 0.4 0.0 - 0.4 K/UL    ABS. BASOPHILS 0.1 0.0 - 0.1 K/UL    ABS. IMM.  GRANS. 0.0 0.00 - 0.04 K/UL    DF AUTOMATED     METABOLIC PANEL, BASIC    Collection Time: 11/29/21  3:54 PM   Result Value Ref Range    Sodium 138 136 - 145 mmol/L    Potassium 3.4 (L) 3.5 - 5.1 mmol/L    Chloride 104 97 - 108 mmol/L    CO2 29 21 - 32 mmol/L    Anion gap 5 5 - 15 mmol/L    Glucose 111 (H) 65 - 100 mg/dL    BUN 21 (H) 6 - 20 MG/DL    Creatinine 1.04 0.70 - 1.30 MG/DL    BUN/Creatinine ratio 20 12 - 20      GFR est AA >60 >60 ml/min/1.73m2    GFR est non-AA >60 >60 ml/min/1.73m2    Calcium 9.0 8.5 - 10.1 MG/DL   EKG, 12 LEAD, INITIAL    Collection Time: 11/29/21  4:04 PM   Result Value Ref Range    Ventricular Rate 70 BPM    Atrial Rate 70 BPM    P-R Interval 182 ms    QRS Duration 142 ms    Q-T Interval 472 ms    QTC Calculation (Bezet) 509 ms    Calculated R Axis -152 degrees    Calculated T Axis 48 degrees    Diagnosis       ** Poor data quality, interpretation may be adversely affected  AV dual-paced rhythm  Biventricular pacemaker detected  When compared with ECG of 07-MAR-2010 05:49,  Vent. rate has decreased BY   2 BPM     URINALYSIS W/ REFLEX CULTURE    Collection Time: 11/29/21  5:15 PM    Specimen: Urine   Result Value Ref Range    Color DARK YELLOW      Appearance CLOUDY (A) CLEAR      Specific gravity >1.030 (H) 1.003 - 1.030    pH (UA) 5.5 5.0 - 8.0      Protein 100 (A) NEG mg/dL    Glucose Negative NEG mg/dL    Ketone TRACE (A) NEG mg/dL    Bilirubin Negative NEG      Blood Negative NEG      Urobilinogen 1.0 0.2 - 1.0 EU/dL    Nitrites Positive (A) NEG      Leukocyte Esterase MODERATE (A) NEG      WBC  0 - 4 /hpf    RBC 5-10 0 - 5 /hpf    Epithelial cells FEW FEW /lpf    Bacteria 2+ (A) NEG /hpf    UA:UC IF INDICATED URINE CULTURE ORDERED (A) CNI         Radiologic Studies -   CT SPINE [facility] WO CONT   Final Result   No pedicle sclerosis. Degenerative disease. No acute finding. XR HIP LT W OR WO PELV 2-3 VWS   Final Result   Left hip fracture.       XR SPINE LUMB 2 OR 3 V   Final Result   No acute lumbar fracture. Study compromised by patient body habitus and   degenerative changes      Increased density of the left T11 pedicle would be better evaluated with the   thoracic radiography or CT                  XR RIBS LT W PA CXR MIN 3 V   Final Result   1. Nondisplaced fracture of the left fifth rib. 2. No acute cardiopulmonary disease. XR CHEST PORT    (Results Pending)     CT Results  (Last 48 hours)               11/29/21 1423  CT SPINE [facility] WO CONT Final result    Impression:  No pedicle sclerosis. Degenerative disease. No acute finding. Narrative:  EXAM:  CT SPINE THORAC WO CONT    INDICATION: Fall, trauma, hyperdensity at T11 noted on x-ray. COMPARISON: Lumbar spine radiographs 11/29/2021. TECHNIQUE:    Multislice helical CT of the thoracic spine was performed. Sagittal and coronal   reformations were generated. CT dose reduction was achieved through use of a   standardized protocol tailored for this examination and automatic exposure   control for dose modulation. FINDINGS:   There is no T11 pedicle sclerosis; the radiographic appearance is due to   degenerative osteophyte formation. There is no fracture or listhesis. There is multilevel degenerative disc and facet disease. There is no apparent spinal canal stenosis. Paraspinal soft tissues are unremarkable. CXR Results  (Last 48 hours)    None            Medical Decision Making   I am the first provider for this patient. I reviewed the vital signs, available nursing notes, past medical history, past surgical history, family history and social history. Vital Signs-Reviewed the patient's vital signs.   Patient Vitals for the past 12 hrs:   Temp Pulse Resp BP SpO2   11/29/21 1915 98.6 °F (37 °C) 72 -- (!) 112/57 93 %   11/29/21 1645 -- 62 16 113/71 94 %   11/29/21 1545 -- 70 16 (!) 96/54 98 %   11/29/21 1532 -- 70 16 108/69 98 %   11/29/21 1215 -- 72 16 105/64 99 % 11/29/21 1145 -- 74 16 (!) 100/57 97 %   11/29/21 1127 98.6 °F (37 °C) 92 14 (!) 106/59 96 %       Records Reviewed: Nursing Notes and Old Medical Records    Provider Notes (Medical Decision Making): We will obtain imaging to evaluate for hip fracture, L-spine fracture, rib fracture. Will treat pain    ED Course:   Initial assessment performed. The patients presenting problems have been discussed, and they are in agreement with the care plan formulated and outlined with them. I have encouraged them to ask questions as they arise throughout their visit. ED Course as of 11/29/21 Zi Isaac Nov 29, 2021   1348 X-ray hip demonstrates nondisplaced femoral neck fracture, there is a single nondisplaced left fifth rib fracture. No acute fracture on L-spine [WB]   1349 Noted hyperdensity to T11, will obtain CT to further evaluate. [WB]      ED Course User Index  [WB] Rowena Carroll MD     Reoperative lab work obtained, orthopedics was consulted plan medicine admission for clearance and likely OR in the morning, n.p.o. at midnight    Disposition:  Admission Note:  Patient is being admitted to the hospital by Dr. Balbir Astorga, Service: Hospitalist.  The results of their tests and reasons for their admission have been discussed with them and available family. They convey agreement and understanding for the need to be admitted and for their admission diagnosis. Diagnosis     Clinical Impression:   1. Closed fracture of neck of left femur, initial encounter ([facility])        Attestations:    Jorge Luis Hernandez M.D. Please note that this dictation was completed with [employer], the [employer] voice recognition software. Quite often unanticipated grammatical, syntax, homophones, and other interpretive errors are inadvertently transcribed by the computer software. Please disregard these errors. Please excuse any errors that have escaped final proofreading. Thank you.

## 2021-11-29 NOTE — CONSULTS
Left hip fracture  Admit to medicine  Npo after midnight  Bedrest  Consent for left hip hemiarthroplasty  Possible total hip arthrplasty    Surgery tomorrow if cleared

## 2021-11-30 ENCOUNTER — APPOINTMENT (OUTPATIENT)
Dept: GENERAL RADIOLOGY | Age: 76
DRG: 522 | End: 2021-11-30
Attending: ORTHOPAEDIC SURGERY
Payer: OTHER GOVERNMENT

## 2021-11-30 ENCOUNTER — ANESTHESIA (OUTPATIENT)
Dept: SURGERY | Age: 76
DRG: 522 | End: 2021-11-30
Payer: OTHER GOVERNMENT

## 2021-11-30 ENCOUNTER — ANESTHESIA EVENT (OUTPATIENT)
Dept: SURGERY | Age: 76
DRG: 522 | End: 2021-11-30
Payer: OTHER GOVERNMENT

## 2021-11-30 LAB
ATRIAL RATE: 70 BPM
BASOPHILS # BLD: 0.1 K/UL (ref 0–0.1)
BASOPHILS NFR BLD: 1 % (ref 0–1)
CALCULATED R AXIS, ECG10: -152 DEGREES
CALCULATED T AXIS, ECG11: 48 DEGREES
DIAGNOSIS, 93000: NORMAL
DIFFERENTIAL METHOD BLD: ABNORMAL
EOSINOPHIL # BLD: 0.8 K/UL (ref 0–0.4)
EOSINOPHIL NFR BLD: 7 % (ref 0–7)
ERYTHROCYTE [DISTWIDTH] IN BLOOD BY AUTOMATED COUNT: 14.3 % (ref 11.5–14.5)
HCT VFR BLD AUTO: 40.9 % (ref 36.6–50.3)
HGB BLD-MCNC: 13.4 G/DL (ref 12.1–17)
IMM GRANULOCYTES # BLD AUTO: 0.1 K/UL (ref 0–0.04)
IMM GRANULOCYTES NFR BLD AUTO: 1 % (ref 0–0.5)
LYMPHOCYTES # BLD: 1.1 K/UL (ref 0.8–3.5)
LYMPHOCYTES NFR BLD: 11 % (ref 12–49)
MCH RBC QN AUTO: 32.2 PG (ref 26–34)
MCHC RBC AUTO-ENTMCNC: 32.8 G/DL (ref 30–36.5)
MCV RBC AUTO: 98.3 FL (ref 80–99)
MONOCYTES # BLD: 1 K/UL (ref 0–1)
MONOCYTES NFR BLD: 10 % (ref 5–13)
NEUTS SEG # BLD: 7.3 K/UL (ref 1.8–8)
NEUTS SEG NFR BLD: 70 % (ref 32–75)
NRBC # BLD: 0 K/UL (ref 0–0.01)
NRBC BLD-RTO: 0 PER 100 WBC
P-R INTERVAL, ECG05: 182 MS
PLATELET # BLD AUTO: 171 K/UL (ref 150–400)
PMV BLD AUTO: 10.7 FL (ref 8.9–12.9)
Q-T INTERVAL, ECG07: 472 MS
QRS DURATION, ECG06: 142 MS
QTC CALCULATION (BEZET), ECG08: 509 MS
RBC # BLD AUTO: 4.16 M/UL (ref 4.1–5.7)
VENTRICULAR RATE, ECG03: 70 BPM
WBC # BLD AUTO: 10.3 K/UL (ref 4.1–11.1)

## 2021-11-30 PROCEDURE — 77030036722: Performed by: ORTHOPAEDIC SURGERY

## 2021-11-30 PROCEDURE — 77030013079 HC BLNKT BAIR HGGR 3M -A: Performed by: NURSE ANESTHETIST, CERTIFIED REGISTERED

## 2021-11-30 PROCEDURE — 77030008684 HC TU ET CUF COVD -B: Performed by: NURSE ANESTHETIST, CERTIFIED REGISTERED

## 2021-11-30 PROCEDURE — 74011250636 HC RX REV CODE- 250/636: Performed by: ORTHOPAEDIC SURGERY

## 2021-11-30 PROCEDURE — 65660000000 HC RM CCU STEPDOWN

## 2021-11-30 PROCEDURE — 76210000016 HC OR PH I REC 1 TO 1.5 HR: Performed by: ORTHOPAEDIC SURGERY

## 2021-11-30 PROCEDURE — 74011250636 HC RX REV CODE- 250/636: Performed by: NURSE ANESTHETIST, CERTIFIED REGISTERED

## 2021-11-30 PROCEDURE — 85025 COMPLETE CBC W/AUTO DIFF WBC: CPT

## 2021-11-30 PROCEDURE — 76060000033 HC ANESTHESIA 1 TO 1.5 HR: Performed by: ORTHOPAEDIC SURGERY

## 2021-11-30 PROCEDURE — 74011250636 HC RX REV CODE- 250/636: Performed by: PHYSICIAN ASSISTANT

## 2021-11-30 PROCEDURE — 77030026438 HC STYL ET INTUB CARD -A: Performed by: NURSE ANESTHETIST, CERTIFIED REGISTERED

## 2021-11-30 PROCEDURE — 94760 N-INVAS EAR/PLS OXIMETRY 1: CPT

## 2021-11-30 PROCEDURE — 74011250637 HC RX REV CODE- 250/637: Performed by: PHYSICIAN ASSISTANT

## 2021-11-30 PROCEDURE — 74011000250 HC RX REV CODE- 250: Performed by: PHYSICIAN ASSISTANT

## 2021-11-30 PROCEDURE — 74011250637 HC RX REV CODE- 250/637: Performed by: ORTHOPAEDIC SURGERY

## 2021-11-30 PROCEDURE — 73501 X-RAY EXAM HIP UNI 1 VIEW: CPT

## 2021-11-30 PROCEDURE — 77030035643 HC BLD SAW OSC PRECIS STRY -C: Performed by: ORTHOPAEDIC SURGERY

## 2021-11-30 PROCEDURE — 77030033138 HC SUT PGA STRATFX J&J -B: Performed by: ORTHOPAEDIC SURGERY

## 2021-11-30 PROCEDURE — 77030018673: Performed by: ORTHOPAEDIC SURGERY

## 2021-11-30 PROCEDURE — 77030031139 HC SUT VCRL2 J&J -A: Performed by: ORTHOPAEDIC SURGERY

## 2021-11-30 PROCEDURE — 77030018723 HC ELCTRD BLD COVD -A: Performed by: ORTHOPAEDIC SURGERY

## 2021-11-30 PROCEDURE — 77030035236 HC SUT PDS STRATFX BARB J&J -B: Performed by: ORTHOPAEDIC SURGERY

## 2021-11-30 PROCEDURE — 36415 COLL VENOUS BLD VENIPUNCTURE: CPT

## 2021-11-30 PROCEDURE — P9045 ALBUMIN (HUMAN), 5%, 250 ML: HCPCS | Performed by: NURSE ANESTHETIST, CERTIFIED REGISTERED

## 2021-11-30 PROCEDURE — C1776 JOINT DEVICE (IMPLANTABLE): HCPCS | Performed by: ORTHOPAEDIC SURGERY

## 2021-11-30 PROCEDURE — 74011250636 HC RX REV CODE- 250/636: Performed by: INTERNAL MEDICINE

## 2021-11-30 PROCEDURE — 76010000161 HC OR TIME 1 TO 1.5 HR INTENSV-TIER 1: Performed by: ORTHOPAEDIC SURGERY

## 2021-11-30 PROCEDURE — 74011250637 HC RX REV CODE- 250/637: Performed by: INTERNAL MEDICINE

## 2021-11-30 PROCEDURE — 77030034479 HC ADH SKN CLSR PRINEO J&J -B: Performed by: ORTHOPAEDIC SURGERY

## 2021-11-30 PROCEDURE — 74011000250 HC RX REV CODE- 250: Performed by: NURSE ANESTHETIST, CERTIFIED REGISTERED

## 2021-11-30 PROCEDURE — 77030022704 HC SUT VLOC COVD -B: Performed by: ORTHOPAEDIC SURGERY

## 2021-11-30 PROCEDURE — 77010033678 HC OXYGEN DAILY

## 2021-11-30 PROCEDURE — 2709999900 HC NON-CHARGEABLE SUPPLY: Performed by: ORTHOPAEDIC SURGERY

## 2021-11-30 PROCEDURE — 77030014647 HC SEAL FBRN TISSL BAXT -D: Performed by: ORTHOPAEDIC SURGERY

## 2021-11-30 PROCEDURE — 77030003666 HC NDL SPINAL BD -A: Performed by: ORTHOPAEDIC SURGERY

## 2021-11-30 PROCEDURE — 74011000272 HC RX REV CODE- 272: Performed by: ORTHOPAEDIC SURGERY

## 2021-11-30 PROCEDURE — 76000 FLUOROSCOPY <1 HR PHYS/QHP: CPT

## 2021-11-30 DEVICE — IMPLANTABLE DEVICE: Type: IMPLANTABLE DEVICE | Site: HIP | Status: FUNCTIONAL

## 2021-11-30 DEVICE — HEAD FEM TAPR 3.5- MM 36 MM HIP BIOLOX DELT STRL: Type: IMPLANTABLE DEVICE | Site: HIP | Status: FUNCTIONAL

## 2021-11-30 DEVICE — COMPONENT TOT HIP CAPPED H2 ADV: Type: IMPLANTABLE DEVICE | Status: FUNCTIONAL

## 2021-11-30 RX ORDER — ONDANSETRON 4 MG/1
4 TABLET, ORALLY DISINTEGRATING ORAL
Status: DISCONTINUED | OUTPATIENT
Start: 2021-12-02 | End: 2021-12-02 | Stop reason: HOSPADM

## 2021-11-30 RX ORDER — POTASSIUM CHLORIDE 750 MG/1
10 TABLET, FILM COATED, EXTENDED RELEASE ORAL DAILY
Status: DISCONTINUED | OUTPATIENT
Start: 2021-12-01 | End: 2021-12-02 | Stop reason: HOSPADM

## 2021-11-30 RX ORDER — FAMOTIDINE 20 MG/1
20 TABLET, FILM COATED ORAL 2 TIMES DAILY
Status: DISCONTINUED | OUTPATIENT
Start: 2021-11-30 | End: 2021-12-02 | Stop reason: HOSPADM

## 2021-11-30 RX ORDER — PROPOFOL 10 MG/ML
INJECTION, EMULSION INTRAVENOUS AS NEEDED
Status: DISCONTINUED | OUTPATIENT
Start: 2021-11-30 | End: 2021-11-30 | Stop reason: HOSPADM

## 2021-11-30 RX ORDER — ALBUMIN HUMAN 50 G/1000ML
SOLUTION INTRAVENOUS AS NEEDED
Status: DISCONTINUED | OUTPATIENT
Start: 2021-11-30 | End: 2021-11-30 | Stop reason: HOSPADM

## 2021-11-30 RX ORDER — ROPIVACAINE HYDROCHLORIDE 5 MG/ML
INJECTION, SOLUTION EPIDURAL; INFILTRATION; PERINEURAL AS NEEDED
Status: DISCONTINUED | OUTPATIENT
Start: 2021-11-30 | End: 2021-11-30 | Stop reason: HOSPADM

## 2021-11-30 RX ORDER — POLYETHYLENE GLYCOL 3350 17 G/17G
17 POWDER, FOR SOLUTION ORAL DAILY
Status: DISCONTINUED | OUTPATIENT
Start: 2021-12-01 | End: 2021-12-02 | Stop reason: HOSPADM

## 2021-11-30 RX ORDER — MIDAZOLAM HYDROCHLORIDE 1 MG/ML
INJECTION, SOLUTION INTRAMUSCULAR; INTRAVENOUS AS NEEDED
Status: DISCONTINUED | OUTPATIENT
Start: 2021-11-30 | End: 2021-11-30 | Stop reason: HOSPADM

## 2021-11-30 RX ORDER — SODIUM CHLORIDE 0.9 % (FLUSH) 0.9 %
5-40 SYRINGE (ML) INJECTION EVERY 8 HOURS
Status: DISCONTINUED | OUTPATIENT
Start: 2021-11-30 | End: 2021-11-30 | Stop reason: HOSPADM

## 2021-11-30 RX ORDER — NEOSTIGMINE METHYLSULFATE 1 MG/ML
INJECTION, SOLUTION INTRAVENOUS AS NEEDED
Status: DISCONTINUED | OUTPATIENT
Start: 2021-11-30 | End: 2021-11-30 | Stop reason: HOSPADM

## 2021-11-30 RX ORDER — SODIUM CHLORIDE 0.9 % (FLUSH) 0.9 %
5-40 SYRINGE (ML) INJECTION AS NEEDED
Status: DISCONTINUED | OUTPATIENT
Start: 2021-11-30 | End: 2021-11-30 | Stop reason: HOSPADM

## 2021-11-30 RX ORDER — CELECOXIB 100 MG/1
200 CAPSULE ORAL 2 TIMES DAILY
Status: DISCONTINUED | OUTPATIENT
Start: 2021-11-30 | End: 2021-12-02 | Stop reason: HOSPADM

## 2021-11-30 RX ORDER — DEXAMETHASONE SODIUM PHOSPHATE 4 MG/ML
10 INJECTION, SOLUTION INTRA-ARTICULAR; INTRALESIONAL; INTRAMUSCULAR; INTRAVENOUS; SOFT TISSUE ONCE
Status: COMPLETED | OUTPATIENT
Start: 2021-12-01 | End: 2021-12-01

## 2021-11-30 RX ORDER — METOPROLOL SUCCINATE 25 MG/1
12.5 TABLET, EXTENDED RELEASE ORAL DAILY
Status: DISCONTINUED | OUTPATIENT
Start: 2021-11-30 | End: 2021-12-02 | Stop reason: HOSPADM

## 2021-11-30 RX ORDER — ONDANSETRON 2 MG/ML
INJECTION INTRAMUSCULAR; INTRAVENOUS AS NEEDED
Status: DISCONTINUED | OUTPATIENT
Start: 2021-11-30 | End: 2021-11-30 | Stop reason: HOSPADM

## 2021-11-30 RX ORDER — TRANEXAMIC ACID 100 MG/ML
INJECTION, SOLUTION INTRAVENOUS AS NEEDED
Status: DISCONTINUED | OUTPATIENT
Start: 2021-11-30 | End: 2021-11-30 | Stop reason: HOSPADM

## 2021-11-30 RX ORDER — LIDOCAINE HYDROCHLORIDE 20 MG/ML
INJECTION, SOLUTION EPIDURAL; INFILTRATION; INTRACAUDAL; PERINEURAL AS NEEDED
Status: DISCONTINUED | OUTPATIENT
Start: 2021-11-30 | End: 2021-11-30 | Stop reason: HOSPADM

## 2021-11-30 RX ORDER — ASPIRIN 81 MG/1
81 TABLET ORAL 2 TIMES DAILY
Status: DISCONTINUED | OUTPATIENT
Start: 2021-11-30 | End: 2021-12-02 | Stop reason: HOSPADM

## 2021-11-30 RX ORDER — DEXAMETHASONE SODIUM PHOSPHATE 4 MG/ML
INJECTION, SOLUTION INTRA-ARTICULAR; INTRALESIONAL; INTRAMUSCULAR; INTRAVENOUS; SOFT TISSUE AS NEEDED
Status: DISCONTINUED | OUTPATIENT
Start: 2021-11-30 | End: 2021-11-30 | Stop reason: HOSPADM

## 2021-11-30 RX ORDER — GLYCOPYRROLATE 0.2 MG/ML
INJECTION INTRAMUSCULAR; INTRAVENOUS AS NEEDED
Status: DISCONTINUED | OUTPATIENT
Start: 2021-11-30 | End: 2021-11-30 | Stop reason: HOSPADM

## 2021-11-30 RX ORDER — NALOXONE HYDROCHLORIDE 0.4 MG/ML
0.4 INJECTION, SOLUTION INTRAMUSCULAR; INTRAVENOUS; SUBCUTANEOUS AS NEEDED
Status: DISCONTINUED | OUTPATIENT
Start: 2021-11-30 | End: 2021-12-02 | Stop reason: HOSPADM

## 2021-11-30 RX ORDER — LIDOCAINE HYDROCHLORIDE 10 MG/ML
0.1 INJECTION, SOLUTION EPIDURAL; INFILTRATION; INTRACAUDAL; PERINEURAL AS NEEDED
Status: DISCONTINUED | OUTPATIENT
Start: 2021-11-30 | End: 2021-11-30 | Stop reason: HOSPADM

## 2021-11-30 RX ORDER — SODIUM CHLORIDE, SODIUM LACTATE, POTASSIUM CHLORIDE, CALCIUM CHLORIDE 600; 310; 30; 20 MG/100ML; MG/100ML; MG/100ML; MG/100ML
25 INJECTION, SOLUTION INTRAVENOUS CONTINUOUS
Status: DISCONTINUED | OUTPATIENT
Start: 2021-11-30 | End: 2021-11-30 | Stop reason: HOSPADM

## 2021-11-30 RX ORDER — PHENYLEPHRINE HCL IN 0.9% NACL 0.4MG/10ML
SYRINGE (ML) INTRAVENOUS AS NEEDED
Status: DISCONTINUED | OUTPATIENT
Start: 2021-11-30 | End: 2021-11-30 | Stop reason: HOSPADM

## 2021-11-30 RX ORDER — DIPHENHYDRAMINE HCL 12.5MG/5ML
12.5 LIQUID (ML) ORAL
Status: DISCONTINUED | OUTPATIENT
Start: 2021-11-30 | End: 2021-12-02 | Stop reason: HOSPADM

## 2021-11-30 RX ORDER — FENTANYL CITRATE 50 UG/ML
INJECTION, SOLUTION INTRAMUSCULAR; INTRAVENOUS AS NEEDED
Status: DISCONTINUED | OUTPATIENT
Start: 2021-11-30 | End: 2021-11-30 | Stop reason: HOSPADM

## 2021-11-30 RX ORDER — SUCCINYLCHOLINE CHLORIDE 20 MG/ML
INJECTION INTRAMUSCULAR; INTRAVENOUS AS NEEDED
Status: DISCONTINUED | OUTPATIENT
Start: 2021-11-30 | End: 2021-11-30 | Stop reason: HOSPADM

## 2021-11-30 RX ORDER — MORPHINE SULFATE 2 MG/ML
2 INJECTION, SOLUTION INTRAMUSCULAR; INTRAVENOUS
Status: ACTIVE | OUTPATIENT
Start: 2021-11-30 | End: 2021-12-01

## 2021-11-30 RX ORDER — ONDANSETRON 2 MG/ML
4 INJECTION INTRAMUSCULAR; INTRAVENOUS
Status: ACTIVE | OUTPATIENT
Start: 2021-11-30 | End: 2021-12-01

## 2021-11-30 RX ORDER — ACETAMINOPHEN 325 MG/1
650 TABLET ORAL EVERY 6 HOURS
Status: DISCONTINUED | OUTPATIENT
Start: 2021-11-30 | End: 2021-12-02 | Stop reason: HOSPADM

## 2021-11-30 RX ORDER — METOPROLOL SUCCINATE 25 MG/1
25 TABLET, EXTENDED RELEASE ORAL DAILY
Status: DISCONTINUED | OUTPATIENT
Start: 2021-11-30 | End: 2021-11-30

## 2021-11-30 RX ORDER — ROCURONIUM BROMIDE 10 MG/ML
INJECTION, SOLUTION INTRAVENOUS AS NEEDED
Status: DISCONTINUED | OUTPATIENT
Start: 2021-11-30 | End: 2021-11-30 | Stop reason: HOSPADM

## 2021-11-30 RX ORDER — SODIUM CHLORIDE 0.9 % (FLUSH) 0.9 %
5-40 SYRINGE (ML) INJECTION EVERY 8 HOURS
Status: DISCONTINUED | OUTPATIENT
Start: 2021-11-30 | End: 2021-12-02 | Stop reason: HOSPADM

## 2021-11-30 RX ORDER — AMOXICILLIN 250 MG
1 CAPSULE ORAL 2 TIMES DAILY
Status: DISCONTINUED | OUTPATIENT
Start: 2021-11-30 | End: 2021-12-02 | Stop reason: HOSPADM

## 2021-11-30 RX ORDER — FACIAL-BODY WIPES
10 EACH TOPICAL DAILY PRN
Status: DISCONTINUED | OUTPATIENT
Start: 2021-12-02 | End: 2021-12-02 | Stop reason: HOSPADM

## 2021-11-30 RX ORDER — SODIUM CHLORIDE 9 MG/ML
50 INJECTION, SOLUTION INTRAVENOUS CONTINUOUS
Status: DISPENSED | OUTPATIENT
Start: 2021-11-30 | End: 2021-12-01

## 2021-11-30 RX ORDER — OXYCODONE HYDROCHLORIDE 5 MG/1
5 TABLET ORAL
Status: DISCONTINUED | OUTPATIENT
Start: 2021-11-30 | End: 2021-12-01 | Stop reason: SDUPTHER

## 2021-11-30 RX ORDER — DULOXETIN HYDROCHLORIDE 30 MG/1
30 CAPSULE, DELAYED RELEASE ORAL DAILY
Status: DISCONTINUED | OUTPATIENT
Start: 2021-12-01 | End: 2021-12-02 | Stop reason: HOSPADM

## 2021-11-30 RX ORDER — SODIUM CHLORIDE, SODIUM LACTATE, POTASSIUM CHLORIDE, CALCIUM CHLORIDE 600; 310; 30; 20 MG/100ML; MG/100ML; MG/100ML; MG/100ML
INJECTION, SOLUTION INTRAVENOUS
Status: DISCONTINUED | OUTPATIENT
Start: 2021-11-30 | End: 2021-11-30 | Stop reason: HOSPADM

## 2021-11-30 RX ORDER — DEXMEDETOMIDINE HYDROCHLORIDE 100 UG/ML
INJECTION, SOLUTION INTRAVENOUS AS NEEDED
Status: DISCONTINUED | OUTPATIENT
Start: 2021-11-30 | End: 2021-11-30 | Stop reason: HOSPADM

## 2021-11-30 RX ORDER — HYDROMORPHONE HYDROCHLORIDE 2 MG/ML
INJECTION, SOLUTION INTRAMUSCULAR; INTRAVENOUS; SUBCUTANEOUS AS NEEDED
Status: DISCONTINUED | OUTPATIENT
Start: 2021-11-30 | End: 2021-11-30 | Stop reason: HOSPADM

## 2021-11-30 RX ORDER — HYDROMORPHONE HYDROCHLORIDE 1 MG/ML
0.2 INJECTION, SOLUTION INTRAMUSCULAR; INTRAVENOUS; SUBCUTANEOUS
Status: DISCONTINUED | OUTPATIENT
Start: 2021-11-30 | End: 2021-11-30 | Stop reason: HOSPADM

## 2021-11-30 RX ORDER — SODIUM CHLORIDE 0.9 % (FLUSH) 0.9 %
5-40 SYRINGE (ML) INJECTION AS NEEDED
Status: DISCONTINUED | OUTPATIENT
Start: 2021-11-30 | End: 2021-12-02 | Stop reason: HOSPADM

## 2021-11-30 RX ORDER — OXYCODONE HYDROCHLORIDE 5 MG/1
10 TABLET ORAL
Status: DISCONTINUED | OUTPATIENT
Start: 2021-11-30 | End: 2021-12-01 | Stop reason: SDUPTHER

## 2021-11-30 RX ORDER — FENTANYL CITRATE 50 UG/ML
25 INJECTION, SOLUTION INTRAMUSCULAR; INTRAVENOUS
Status: DISCONTINUED | OUTPATIENT
Start: 2021-11-30 | End: 2021-11-30 | Stop reason: HOSPADM

## 2021-11-30 RX ADMIN — Medication 120 MCG: at 11:45

## 2021-11-30 RX ADMIN — SUCCINYLCHOLINE CHLORIDE 80 MG: 20 INJECTION, SOLUTION INTRAMUSCULAR; INTRAVENOUS at 11:33

## 2021-11-30 RX ADMIN — DEXAMETHASONE SODIUM PHOSPHATE 4 MG: 4 INJECTION, SOLUTION INTRAMUSCULAR; INTRAVENOUS at 11:30

## 2021-11-30 RX ADMIN — GLYCOPYRROLATE 0.4 MG: 0.2 INJECTION, SOLUTION INTRAMUSCULAR; INTRAVENOUS at 12:40

## 2021-11-30 RX ADMIN — Medication 10 ML: at 21:49

## 2021-11-30 RX ADMIN — ACETAMINOPHEN 650 MG: 325 TABLET ORAL at 06:17

## 2021-11-30 RX ADMIN — Medication 80 MCG: at 12:09

## 2021-11-30 RX ADMIN — ROCURONIUM BROMIDE 20 MG: 10 INJECTION INTRAVENOUS at 11:46

## 2021-11-30 RX ADMIN — Medication 1 LOZENGE: at 16:32

## 2021-11-30 RX ADMIN — PROPOFOL 110 MG: 10 INJECTION, EMULSION INTRAVENOUS at 11:33

## 2021-11-30 RX ADMIN — FAMOTIDINE 20 MG: 20 TABLET, FILM COATED ORAL at 17:56

## 2021-11-30 RX ADMIN — Medication 120 MCG: at 12:26

## 2021-11-30 RX ADMIN — Medication 80 MCG: at 12:40

## 2021-11-30 RX ADMIN — LIDOCAINE HYDROCHLORIDE 100 MG: 20 INJECTION, SOLUTION INTRAVENOUS at 11:33

## 2021-11-30 RX ADMIN — WATER 2 G: 1 INJECTION INTRAMUSCULAR; INTRAVENOUS; SUBCUTANEOUS at 11:28

## 2021-11-30 RX ADMIN — DEXMEDETOMIDINE HYDROCHLORIDE 4 MCG: 100 INJECTION, SOLUTION, CONCENTRATE INTRAVENOUS at 11:58

## 2021-11-30 RX ADMIN — ALBUMIN (HUMAN) 12.5 G: 2.5 SOLUTION INTRAVENOUS at 12:31

## 2021-11-30 RX ADMIN — ASPIRIN 81 MG: 81 TABLET, COATED ORAL at 17:56

## 2021-11-30 RX ADMIN — Medication 120 MCG: at 12:33

## 2021-11-30 RX ADMIN — Medication 10 ML: at 06:17

## 2021-11-30 RX ADMIN — Medication 120 MCG: at 11:39

## 2021-11-30 RX ADMIN — Medication 120 MCG: at 12:36

## 2021-11-30 RX ADMIN — SODIUM CHLORIDE 50 ML/HR: 9 INJECTION, SOLUTION INTRAVENOUS at 15:22

## 2021-11-30 RX ADMIN — TRANEXAMIC ACID 1 G: 100 INJECTION, SOLUTION INTRAVENOUS at 11:48

## 2021-11-30 RX ADMIN — DEXMEDETOMIDINE HYDROCHLORIDE 4 MCG: 100 INJECTION, SOLUTION, CONCENTRATE INTRAVENOUS at 12:04

## 2021-11-30 RX ADMIN — ACETAMINOPHEN 650 MG: 325 TABLET ORAL at 17:56

## 2021-11-30 RX ADMIN — MIDAZOLAM HYDROCHLORIDE 2 MG: 1 INJECTION, SOLUTION INTRAMUSCULAR; INTRAVENOUS at 11:25

## 2021-11-30 RX ADMIN — Medication 80 MCG: at 12:16

## 2021-11-30 RX ADMIN — METOPROLOL SUCCINATE 12.5 MG: 25 TABLET, EXTENDED RELEASE ORAL at 08:45

## 2021-11-30 RX ADMIN — DOCUSATE SODIUM 50MG AND SENNOSIDES 8.6MG 1 TABLET: 8.6; 5 TABLET, FILM COATED ORAL at 17:56

## 2021-11-30 RX ADMIN — Medication 80 MCG: at 12:18

## 2021-11-30 RX ADMIN — SODIUM CHLORIDE, POTASSIUM CHLORIDE, SODIUM LACTATE AND CALCIUM CHLORIDE: 600; 310; 30; 20 INJECTION, SOLUTION INTRAVENOUS at 11:27

## 2021-11-30 RX ADMIN — CELECOXIB 200 MG: 100 CAPSULE ORAL at 17:56

## 2021-11-30 RX ADMIN — HYDROMORPHONE HYDROCHLORIDE 0.5 MG: 2 INJECTION, SOLUTION INTRAMUSCULAR; INTRAVENOUS; SUBCUTANEOUS at 12:19

## 2021-11-30 RX ADMIN — Medication 1 AMPULE: at 21:00

## 2021-11-30 RX ADMIN — ONDANSETRON HYDROCHLORIDE 4 MG: 2 INJECTION, SOLUTION INTRAMUSCULAR; INTRAVENOUS at 11:59

## 2021-11-30 RX ADMIN — NEOSTIGMINE METHYLSULFATE 2 MG: 1 INJECTION, SOLUTION INTRAVENOUS at 12:40

## 2021-11-30 RX ADMIN — Medication 120 MCG: at 12:30

## 2021-11-30 RX ADMIN — Medication 10 ML: at 16:08

## 2021-11-30 RX ADMIN — FENTANYL CITRATE 100 MCG: 50 INJECTION, SOLUTION INTRAMUSCULAR; INTRAVENOUS at 11:31

## 2021-11-30 RX ADMIN — OXYCODONE 5 MG: 5 TABLET ORAL at 08:45

## 2021-11-30 RX ADMIN — CEFAZOLIN SODIUM 2 G: 1 INJECTION, POWDER, FOR SOLUTION INTRAMUSCULAR; INTRAVENOUS at 21:49

## 2021-11-30 NOTE — PERIOP NOTES
Floseal Hemostatic Matrix 10mL Reference: [id number] Lot number: [id number] Expiration date: 07/16/2023

## 2021-11-30 NOTE — PROGRESS NOTES
TRANSFER - IN REPORT:    Verbal report received from Radha RN(name) on Christie Berger  being received from [employer]) for routine post - op      Report consisted of patients Situation, Background, Assessment and   Recommendations(SBAR). Information from the following report(s) SBAR and Kardex was reviewed with the receiving nurse. Opportunity for questions and clarification was provided. Assessment completed upon patients arrival to unit and care assumed.

## 2021-11-30 NOTE — PERIOP NOTES
Handoff Report from Operating Room to PACU    Report received from Jass Alcantara and Edilma Levin CRNA regarding Olinda Starr. Surgeon(s):  Jules Hare MD  And Procedure(s) (LRB):  LEFT HIP ARTHROPLASTY TOTAL ANTERIOR APPROACH (Left)  confirmed   with allergies and dressings discussed. Anesthesia type, drugs, patient history, complications, estimated blood loss, vital signs, intake and output, and last pain medication, lines, reversal medications and temperature were reviewed. 1328- Patient tolerating ice chips well without complication. 1409- TRANSFER - OUT REPORT:    Verbal report given to Yoshi ANUM(name) on Olinda Starr  being transferred to [facility](unit) for routine post - op       Report consisted of patients Situation, Background, Assessment and   Recommendations(SBAR). Information from the following report(s) OR Summary, Procedure Summary, Intake/Output and MAR was reviewed with the receiving nurse. Lines:   Peripheral IV 11/29/21 Left Antecubital (Active)   Site Assessment Clean, dry, & intact 11/30/21 1400   Phlebitis Assessment 0 11/30/21 1400   Infiltration Assessment 0 11/30/21 1400   Dressing Status Clean, dry, & intact 11/30/21 1400   Dressing Type Tape; Transparent 11/30/21 1400   Hub Color/Line Status Pink; Capped; Flushed 11/30/21 1400   Action Taken Open ports on tubing capped 11/30/21 0331   Alcohol Cap Used Yes 11/30/21 0331        Opportunity for questions and clarification was provided.       Patient transported with:   O2 @ 2l liters  Registered Nurse  Moi

## 2021-11-30 NOTE — PROGRESS NOTES
Patient had 7 beats of V-tach. He is denying chest pain or SOB. Dr Vu Deleon notified.  No new orders

## 2021-11-30 NOTE — PROGRESS NOTES
End of Shift Note    Bedside shift change report given to Jose Juan Schmid RN (oncoming nurse) by Campbell Norton LPN (offgoing nurse). Report included the following information SBAR, Kardex, Procedure Summary, Intake/Output, MAR and Recent Results    Shift worked:  night     Shift summary and any significant changes:     Pt came in to ED with hip pain following a ground-level fall. X-ray confirmed a fracture in that left hip and pt to go to surgery this morning. Concerns for physician to address:  surgery     Zone phone for oncoming shift:   [phone]       Activity:  Activity Level: Bed Rest  Number times ambulated in hallways past shift: 0  Number of times OOB to chair past shift: 0    Cardiac:   Cardiac Monitoring: Yes     Cardiac Rhythm: AV Paced    Access:   Current line(s): PIV     Genitourinary:   Urinary status: camp    Respiratory:   O2 Device: None (Room air)  Chronic home O2 use?: NO  Incentive spirometer at bedside: YES     GI:     Current diet:  DIET NPO  Passing flatus: YES  Tolerating current diet: YES       Pain Management:   Patient states pain is manageable on current regimen: YES    Skin:  Dario Score: 16  Interventions: float heels and internal/external urinary devices    Patient Safety:  Fall Score:  Total Score: 3  Interventions: gripper socks and pt to call before getting OOB  High Fall Risk: Yes    Length of Stay:  Expected LOS: - - -  Actual LOS: [facility]

## 2021-11-30 NOTE — PROGRESS NOTES
End of Shift Note    Bedside shift change report given to Jaimee Haddad (oncoming nurse) by Jody Smith RN (offgoing nurse). Report included the following information SBAR, Kardex, Intake/Output, MAR and Recent Results    Shift worked:  day     Shift summary and any significant changes:     pod0 drowsy, resting in bed, denies pain, continue to monitor     Concerns for physician to address:  drowsy     Zone phone for oncoming shift:   [phone]       Activity:  Activity Level: Bed Rest  Number times ambulated in hallways past shift: 0  Number of times OOB to chair past shift: 0    Cardiac:   Cardiac Monitoring: Yes      Cardiac Rhythm: AV Paced    Access:   Current line(s): PIV     Genitourinary:   Urinary status: camp    Respiratory:   O2 Device: Nasal cannula  Chronic home O2 use?: NO  Incentive spirometer at bedside: YES     GI:  Last Bowel Movement Date: 11/28/21  Current diet:  ADULT DIET Regular  Passing flatus: NO  Tolerating current diet: YES       Pain Management:   Patient states pain is manageable on current regimen: YES    Skin:  Dario Score: 17  Interventions: float heels, increase time out of bed, PT/OT consult, limit briefs and internal/external urinary devices    Patient Safety:  Fall Score:  Total Score: 3  Interventions: assistive device (walker, cane, etc), gripper socks and pt to call before getting OOB  High Fall Risk: Yes    Length of Stay:  Expected LOS: - - -  Actual LOS: [facility], RN

## 2021-11-30 NOTE — ED NOTES
TRANSFER - IN REPORT:    Verbal report received from Martha (name) on [facility]. Report consisted of patients Situation, Background, Assessment and   Recommendations(SBAR). Information from the following report(s) SBAR and ED Summary was reviewed with the receiving nurse. Opportunity for questions and clarification was provided. 2100 Pt asleep in stretcher     1020 Update provided to pt wife     1045 Patient is being transferred to [facility] 3 Orthopedics, Room # 21 [phone]. Report given to RN on [facility] for routine progression of care. Report consisted of the following information SBAR, Kardex, ED Summary and MAR. Patient transferred to receiving unit by: transport (RN or tech name). Outstanding consults needed: No     Next labs due: 0400 am    The following personal items will be sent with the patient during transfer to the floor:     All valuables:    Cardiac monitoring ordered: Yes    The following CURRENT information was reported to the receiving RN:    Code status: Full Code at time of transfer    Last set of vital signs:  Vital Signs  Level of Consciousness: Alert (0) (11/29/21 2200)  Temp: 98.3 °F (36.8 °C) (11/29/21 2200)  Temp Source: Oral (11/29/21 2200)  Pulse (Heart Rate): 72 (11/29/21 2200)  Heart Rate Source: Monitor (11/29/21 2200)  Resp Rate: 16 (11/29/21 2200)  BP: 118/79 (11/29/21 2200)  MAP (Monitor): 84 (11/29/21 2200)  MAP (Calculated): 92 (11/29/21 2200)  BP 1 Location: Right arm (11/29/21 1645)  BP 1 Method: Automatic (11/29/21 1645)  BP Patient Position: At rest (11/29/21 1645)  MEWS Score: 1 (11/29/21 2200)         Oxygen Therapy  O2 Sat (%): 96 % (11/29/21 2200)  Pulse via Oximetry: 71 beats per minute (11/29/21 2200)  O2 Device: None (Room air) (11/29/21 1127)      Last pain assessment:  Pain 1  Pain Scale 1: Numeric (0 - 10)  Pain Intensity 1: 7  Patient Stated Pain Goal: 0  Pain Reassessment 1: Yes  Pain Onset 1: yesterday  Pain Location 1: Hip  Pain Intervention(s) 1: Medication (see MAR)      Wounds: No     Urinary catheter: camp  Is there a camp order: Yes    LDAs:       Peripheral IV 11/29/21 Left Antecubital (Active)   Site Assessment Clean, dry, & intact 11/29/21 1546   Phlebitis Assessment 0 11/29/21 1546   Infiltration Assessment 0 11/29/21 1546   Dressing Status Clean, dry, & intact 11/29/21 1546         Opportunity for questions and clarification was provided.     Kellie Arcos RN

## 2021-11-30 NOTE — ANESTHESIA POSTPROCEDURE EVALUATION
Procedure(s):  LEFT HIP ARTHROPLASTY TOTAL ANTERIOR APPROACH. general    Anesthesia Post Evaluation      Multimodal analgesia: multimodal analgesia used between 6 hours prior to anesthesia start to PACU discharge  Patient location during evaluation: PACU  Patient participation: complete - patient participated  Level of consciousness: awake and alert  Pain management: adequate  Airway patency: patent  Anesthetic complications: no  Cardiovascular status: acceptable, hemodynamically stable and blood pressure returned to baseline  Respiratory status: acceptable and nasal cannula  Hydration status: euvolemic  Post anesthesia nausea and vomiting:  none  Final Post Anesthesia Temperature Assessment:  Normothermia (36.0-37.5 degrees C)      INITIAL Post-op Vital signs:   Vitals Value Taken Time   /56 11/30/21 1345   Temp 36.6 °C (97.9 °F) 11/30/21 1301   Pulse 70 11/30/21 1356   Resp 15 11/30/21 1356   SpO2 96 % 11/30/21 1356   Vitals shown include unvalidated device data.

## 2021-11-30 NOTE — PROGRESS NOTES
Hospitalist Progress Note    NAME: Rey Snellen   :  1945   MRN:  [id number]       Assessment / Plan:  Hip fracture from GLF  S/ left Total Hip Arthroplasty, Direct Anterior Approach  DVT prophylaxis and pain management per ortho      Hx of chronic systolic HF, EF 59% s//p AICD  Hx of congenital heart block s/p PM.    HTN  No recent cardiac events, no hx of stent placement. No recent admission for heart failure. Currently euvolemic. Cont' home medications. Monitor fluid status. Pt follows at the [address] to help with med rec     L 5th rib fracture  Nondisplaced. Aggressive pulmonary toilet. Pain control. O2 suppl prn        Code Status: Full   Surrogate Decision Maker: pt's wife  DVT Prophylaxis: SCD for now  GI Prophylaxis: not indicated  Baseline:     25.0 - 29.9 Overweight / Body mass index is 26.35 kg/m². Estimated discharge date: dec 2nd   Barriers:pt/ot          Subjective:     Chief Complaint / Reason for Physician Visit  Fu hip fracture . Seen after surgery . Discussed with RN events overnight. Review of Systems:  Symptom Y/N Comments  Symptom Y/N Comments   Fever/Chills n   Chest Pain n    Poor Appetite    Edema     Cough    Abdominal Pain n    Sputum    Joint Pain     SOB/SOLARES n   Pruritis/Rash     Nausea/vomit n   Tolerating PT/OT     Diarrhea n   Tolerating Diet     Constipation    Other       Could NOT obtain due to:      Objective:     VITALS:   Last 24hrs VS reviewed since prior progress note.  Most recent are:  Patient Vitals for the past 24 hrs:   Temp Pulse Resp BP SpO2   21 0718 98.6 °F (37 °C) 76 18 111/66 93 %   21 0331 98.6 °F (37 °C) 74 18 94/60 93 %   21 2302 98.4 °F (36.9 °C) 70 18 137/83 94 %   21 2200 98.3 °F (36.8 °C) 72 16 118/79 96 %   21 1915 98.6 °F (37 °C) 72 -- (!) 112/57 93 %   21 1645 -- 62 16 113/71 94 %   21 1545 -- 70 16 (!) 96/54 98 %   21 1532 -- 70 16 108/69 98 %   21 1215 -- 72 16 105/64 99 %   11/29/21 1145 -- 74 16 (!) 100/57 97 %   11/29/21 1127 98.6 °F (37 °C) 92 14 (!) 106/59 96 %       Intake/Output Summary (Last 24 hours) at 11/30/2021 0901  Last data filed at 11/30/2021 6894  Gross per 24 hour   Intake --   Output 525 ml   Net -525 ml        I had a face to face encounter and independently examined this patient on 11/30/2021, as outlined below:  PHYSICAL EXAM:  General: WD, WN. Alert, cooperative, no acute distress    EENT:  EOMI. Anicteric sclerae. MMM  Resp:  CTA bilaterally, no wheezing or rales. No accessory muscle use  CV:  Regular  rhythm,  No edema  GI:  Soft, Non distended, Non tender. +Bowel sounds  Neurologic:  Alert and oriented X 3, normal speech,   Psych:   Good insight. Not anxious nor agitated  Skin:  No rashes. No jaundice    Reviewed most current lab test results and cultures  YES  Reviewed most current radiology test results   YES  Review and summation of old records today    NO  Reviewed patient's current orders and MAR    YES  PMH/SH reviewed - no change compared to H&P  ________________________________________________________________________  Care Plan discussed with:    Comments   Patient y    Family      RN y    Care Manager     Consultant                        Multidiciplinary team rounds were held today with , nursing, pharmacist and clinical coordinator. Patient's plan of care was discussed; medications were reviewed and discharge planning was addressed.      ________________________________________________________________________  Total NON critical care TIME:35 Minutes    Total CRITICAL CARE TIME Spent:   Minutes non procedure based      Comments   >50% of visit spent in counseling and coordination of care     ________________________________________________________________________  Lupillo Madrigal MD     Procedures: see electronic medical records for all procedures/Xrays and details which were not copied into this note but were reviewed prior to creation of Plan. LABS:  I reviewed today's most current labs and imaging studies.   Pertinent labs include:  Recent Labs     11/30/21  0337 11/29/21  1554   WBC 10.3 8.9   HGB 13.4 13.3   HCT 40.9 40.3    168     Recent Labs     11/29/21  1554      K 3.4*      CO2 29   *   BUN 21*   CREA 1.04   CA 9.0       Signed: Diego Mayers MD

## 2021-11-30 NOTE — PROGRESS NOTES
ORTHOPAEDIC CONSULT NOTE    Subjective:     Date of Consultation:  November 30, 2021      Jacy Bergeron is a 68 y.o. male who is being seen for left hip pain. Pt reports injury occurred after slipping getting out of his truck due to sciatic nerve type pain. Workup has revealed left femoral neck fracture. Ambulates at baseline with walking stick. Pt. Denies head trauma/LOC during injury. Patient Active Problem List    Diagnosis Date Noted    Hip fracture ([facility] 11/29/2021    [facility] ([facility] 08/14/2017    Heart block 08/14/2017    Pacemaker 08/14/2017    Essential hypertension 08/14/2017     History reviewed. No pertinent family history. Social History     Tobacco Use    Smoking status: Never Smoker    Smokeless tobacco: Not on file   Substance Use Topics    Alcohol use: Never     Past Medical History:   Diagnosis Date    [facility] 8/14/2017    Essential hypertension 8/14/2017    Heart block 8/14/2017    Pacemaker 8/14/2017      Past Surgical History:   Procedure Laterality Date    HX BLADDER REPAIR      HX CORONARY STENT PLACEMENT      HX PACEMAKER        Prior to Admission medications    Medication Sig Start Date End Date Taking? Authorizing Provider   metoprolol succinate (Toprol XL) 25 mg XL tablet Take 25 mg by mouth daily. Yes Provider, Historical   niacin ER (NIASPAN) 750 mg Tb24 Take 1,500 mg by mouth nightly. Yes Provider, Historical   potassium chloride SR (KLOR-CON 10) 10 mEq tablet Take 10 mEq by mouth daily. Yes Provider, Historical   cetirizine (ZYRTEC) 10 mg tablet Take 10 mg by mouth nightly. Yes Provider, Historical   DULoxetine (CYMBALTA) 30 mg capsule Take 30 mg by mouth daily. Yes Provider, Historical   calcium polycarbophiL (Fiber Laxative, ca polycarbo,) 625 mg tablet Take 1,250 mg by mouth four (4) times daily.    Yes Provider, Historical   sacubitriL-valsartan [place]) 24-26 mg tablet Take 1 Tablet by mouth two (2) times a day. Yes Provider, Historical   furosemide (LASIX) 40 mg tablet Take 60 mg by mouth daily. Yes Other, MD Jessica   HYDROcodone-acetaminophen (NORCO) 5-325 mg per tablet Take 1 Tab by mouth two (2) times daily as needed for Pain. Max Daily Amount: 2 Tabs. 3/1/21  Yes Kwame López MD   aspirin (ASPIRIN) 325 mg tablet Take 325 mg by mouth daily. Indications: a heart attack   Yes Provider, Historical   OTHER Please provide patient with catheters listed below: Catheter is: [facility], Male, FR 14/4.7mm.    Please fax to Juan Jmarilyn Guilherme 3/1/21   Kwame López MD     Current Facility-Administered Medications   Medication Dose Route Frequency    metoprolol succinate (TOPROL-XL) XL tablet 12.5 mg  12.5 mg Oral DAILY    oxyCODONE IR (ROXICODONE) tablet 2.5 mg  2.5 mg Oral Q4H PRN    oxyCODONE IR (ROXICODONE) tablet 5 mg  5 mg Oral Q4H PRN    naloxone (NARCAN) injection 0.4 mg  0.4 mg IntraVENous PRN    senna-docusate (PERICOLACE) 8.6-50 mg per tablet 2 Tablet  2 Tablet Oral BID    HYDROmorphone (DILAUDID) injection 0.5-1 mg  0.5-1 mg IntraVENous Q2H PRN    acetaminophen (TYLENOL) tablet 650 mg  650 mg Oral Q6H    sodium chloride (NS) flush 5-40 mL  5-40 mL IntraVENous Q8H    sodium chloride (NS) flush 5-40 mL  5-40 mL IntraVENous PRN    acetaminophen (TYLENOL) tablet 650 mg  650 mg Oral Q6H PRN    Or    acetaminophen (TYLENOL) suppository 650 mg  650 mg Rectal Q6H PRN    polyethylene glycol (MIRALAX) packet 17 g  17 g Oral DAILY PRN    ondansetron (ZOFRAN ODT) tablet 4 mg  4 mg Oral Q8H PRN    Or    ondansetron (ZOFRAN) injection 4 mg  4 mg IntraVENous Q6H PRN      Allergies   Allergen Reactions    Celery Other (comments)     Mouth burns    Erythromycin Other (comments)     cramping    Statins-Hmg-Coa Reductase Inhibitors Myalgia        Review of Systems:  A comprehensive review of systems was negative except for that written in the HPI.    Mental Status: no dementia    Objective:     Patient Vitals for the past 8 hrs:   BP Temp Pulse Resp SpO2   21 0718 111/66 98.6 °F (37 °C) 76 18 93 %   21 0331 94/60 98.6 °F (37 °C) 74 18 93 %     Temp (24hrs), Av.5 °F (36.9 °C), Min:98.3 °F (36.8 °C), Max:98.6 °F (37 °C)      Gen: Well-developed,  in no acute distress. Resting comfortably in bed. HEENT: Pink conjunctivae, hearing intact to voice, moist mucous membranes   Neck: Supple  Resp: No respiratory distress   Card: palpable distal pulse-equal bilaterally, brisk cap refill all distal digits   Abd: non-distended  Musc: Left leg shortened and externally rotated. ROM not tested due to known fracture. ROM and strength intact left ankle and toes. Skin: No skin breakdown noted. Skin warm, pink, dry. No breaks in the skin. Compartments soft to the touch. No sign of previous scarring. Neuro: Cranial nerves are grossly intact, no focal motor weakness, follows commands appropriately. Sensation to light touch itnact. Psych: Good insight, oriented to person, place and time, alert    Imaging Review:EXAM: XR HIP LT W OR WO PELV 2-3 VWS     INDICATION: fall, trauma.     COMPARISON: None.     FINDINGS: AP view of the pelvis and a true lateral view of the left hip  demonstrate femoral neck fracture without displacement. Femoral head is located. There is osteoarthritis.     IMPRESSION  Left hip fracture.     Labs:   Recent Results (from the past 24 hour(s))   TYPE & SCREEN    Collection Time: 21  3:53 PM   Result Value Ref Range    Crossmatch Expiration 2021,2359     ABO/Rh(D) Jean Claude Booze POSITIVE     Antibody screen NEG    CBC WITH AUTOMATED DIFF    Collection Time: 21  3:54 PM   Result Value Ref Range    WBC 8.9 4.1 - 11.1 K/uL    RBC 4.16 4.10 - 5.70 M/uL    HGB 13.3 12.1 - 17.0 g/dL    HCT 40.3 36.6 - 50.3 %    MCV 96.9 80.0 - 99.0 FL    MCH 32.0 26.0 - 34.0 PG    MCHC 33.0 30.0 - 36.5 g/dL    RDW 13.9 11.5 - 14.5 %    PLATELET 015 324 - 400 K/uL    MPV 10.7 8.9 - 12.9 FL    NRBC 0.0 0  WBC    ABSOLUTE NRBC 0.00 0.00 - 0.01 K/uL    NEUTROPHILS 69 32 - 75 %    LYMPHOCYTES 12 12 - 49 %    MONOCYTES 14 (H) 5 - 13 %    EOSINOPHILS 4 0 - 7 %    BASOPHILS 1 0 - 1 %    IMMATURE GRANULOCYTES 0 0.0 - 0.5 %    ABS. NEUTROPHILS 6.1 1.8 - 8.0 K/UL    ABS. LYMPHOCYTES 1.1 0.8 - 3.5 K/UL    ABS. MONOCYTES 1.2 (H) 0.0 - 1.0 K/UL    ABS. EOSINOPHILS 0.4 0.0 - 0.4 K/UL    ABS. BASOPHILS 0.1 0.0 - 0.1 K/UL    ABS. IMM. GRANS. 0.0 0.00 - 0.04 K/UL    DF AUTOMATED     METABOLIC PANEL, BASIC    Collection Time: 11/29/21  3:54 PM   Result Value Ref Range    Sodium 138 136 - 145 mmol/L    Potassium 3.4 (L) 3.5 - 5.1 mmol/L    Chloride 104 97 - 108 mmol/L    CO2 29 21 - 32 mmol/L    Anion gap 5 5 - 15 mmol/L    Glucose 111 (H) 65 - 100 mg/dL    BUN 21 (H) 6 - 20 MG/DL    Creatinine 1.04 0.70 - 1.30 MG/DL    BUN/Creatinine ratio 20 12 - 20      GFR est AA >60 >60 ml/min/1.73m2    GFR est non-AA >60 >60 ml/min/1.73m2    Calcium 9.0 8.5 - 10.1 MG/DL   EKG, 12 LEAD, INITIAL    Collection Time: 11/29/21  4:04 PM   Result Value Ref Range    Ventricular Rate 70 BPM    Atrial Rate 70 BPM    P-R Interval 182 ms    QRS Duration 142 ms    Q-T Interval 472 ms    QTC Calculation (Bezet) 509 ms    Calculated R Axis -152 degrees    Calculated T Axis 48 degrees    Diagnosis       ** Poor data quality, interpretation may be adversely affected  AV dual-paced rhythm  Biventricular pacemaker detected  When compared with ECG of 07-MAR-2010 05:49,  Vent.  rate has decreased BY   2 BPM     URINALYSIS W/ REFLEX CULTURE    Collection Time: 11/29/21  5:15 PM    Specimen: Urine   Result Value Ref Range    Color DARK YELLOW      Appearance CLOUDY (A) CLEAR      Specific gravity >1.030 (H) 1.003 - 1.030    pH (UA) 5.5 5.0 - 8.0      Protein 100 (A) NEG mg/dL    Glucose Negative NEG mg/dL    Ketone TRACE (A) NEG mg/dL    Bilirubin Negative NEG      Blood Negative NEG      Urobilinogen 1.0 0.2 - 1.0 EU/dL    Nitrites Positive (A) NEG      Leukocyte Esterase MODERATE (A) NEG      WBC  0 - 4 /hpf    RBC 5-10 0 - 5 /hpf    Epithelial cells FEW FEW /lpf    Bacteria 2+ (A) NEG /hpf    UA:UC IF INDICATED URINE CULTURE ORDERED (A) CNI     CBC WITH AUTOMATED DIFF    Collection Time: 11/30/21  3:37 AM   Result Value Ref Range    WBC 10.3 4.1 - 11.1 K/uL    RBC 4.16 4.10 - 5.70 M/uL    HGB 13.4 12.1 - 17.0 g/dL    HCT 40.9 36.6 - 50.3 %    MCV 98.3 80.0 - 99.0 FL    MCH 32.2 26.0 - 34.0 PG    MCHC 32.8 30.0 - 36.5 g/dL    RDW 14.3 11.5 - 14.5 %    PLATELET 910 392 - 883 K/uL    MPV 10.7 8.9 - 12.9 FL    NRBC 0.0 0  WBC    ABSOLUTE NRBC 0.00 0.00 - 0.01 K/uL    NEUTROPHILS 70 32 - 75 %    LYMPHOCYTES 11 (L) 12 - 49 %    MONOCYTES 10 5 - 13 %    EOSINOPHILS 7 0 - 7 %    BASOPHILS 1 0 - 1 %    IMMATURE GRANULOCYTES 1 (H) 0.0 - 0.5 %    ABS. NEUTROPHILS 7.3 1.8 - 8.0 K/UL    ABS. LYMPHOCYTES 1.1 0.8 - 3.5 K/UL    ABS. MONOCYTES 1.0 0.0 - 1.0 K/UL    ABS. EOSINOPHILS 0.8 (H) 0.0 - 0.4 K/UL    ABS. BASOPHILS 0.1 0.0 - 0.1 K/UL    ABS. IMM. GRANS. 0.1 (H) 0.00 - 0.04 K/UL    DF AUTOMATED           Impression:     Patient Active Problem List    Diagnosis Date Noted    Hip fracture ([facility] 11/29/2021    Chronic systolic congestive heart failure ([facility] 08/14/2017    Heart block 08/14/2017    Pacemaker 08/14/2017    Essential hypertension 08/14/2017     Active Problems:    Hip fracture ([facility] (11/29/2021)        Plan:       -  Medicine for Pre-Operative Clearence/ Post-Operative management.    -  DVT Prophylaxis - held for surgery;  daily at baseline  -  Plan for Left total hip arthroplasty with Dr Sunshine Wells this morning  - Continue NWB and NPO for now      Dr. Sunshine Wells aware and agrees with plan as above.         ELVIN Pastrana  [employer]

## 2021-11-30 NOTE — PROGRESS NOTES
TRANSFER - IN REPORT:    Verbal report received from Brook Pack RN on Hieu Pena  being received from the ED for routine progression of care      Report consisted of patients Situation, Background, Assessment and   Recommendations(SBAR). Information from the following report(s) SBAR, Kardex, ED Summary, Procedure Summary, Intake/Output and MAR was reviewed with the receiving nurse. Opportunity for questions and clarification was provided. Assessment completed upon patients arrival to unit and care assumed.

## 2021-11-30 NOTE — OP NOTES
Torey Mireles MD - Adult Reconstruction and Total Joint Replacement    [facility] - MRN [id number] - : 1945 (68 y.o.)      Date: 2021    Pre-operative Diagnosis: Left Hip DJD     Post-operative Diagnosis: same     Procedure:  (1) Left Total Hip Arthroplasty, Direct Anterior Approach    (2) Intraoperative Fluoroscopy       Implants Used:   Implant Name Type Inv. Item Serial No.  Lot No. LRB No. Used Action   SHELL ACET MH 62 MM HIP GRP 8 CUP ALTEON - [id number]  SHELL ACET MH 62 MM HIP GRP 8 CUP ALTEON [id number] [employer] NA Left 1 Implanted   LINER ACET NEUT 36X62-68 MM HIP GRP 8 XLE ALTEON - [id number]  LINER ACET NEUT 36X62-68 MM HIP GRP 8 XLE [employer]TEON [id number] [employer] NA Left 1 Implanted   STEM FEM OD16MM 12/14 CLLRLSS EXT OFFSET TAPR HIP ZIRCONIA - [id number]  STEM FEM OD16MM 12/14 CLLRLSS EXT OFFSET TAPR HIP [employer]IRCONIA [id number] [employer]WD NA Left 1 Implanted   HEAD FEM TAPR 3.5- MM 36 MM HIP BIOLOX DELT STRL - [id number]  HEAD FEM TAPR 3.5- MM 36 MM HIP BIOLOX DELT STRL [id number] [employer]WD NA Left 1 Implanted       Anesthesia: GETA + local    Pre-operative antibiotic: Ancef    Surgeon: Torey Mireles     Assist: ELVIN Bangura (Performing all or most of the following assistant-at-surgery services including but not limited to: proper patient positioning, sterile/prep and draping, placement of instruments/trackers, operative exposure, minor portions of bone / soft tissue excision, final irrigation and debridement, deep and superficial closure, application of final dressings)    EBL: 300cc     Drains: none     Specimens: none     Complications: none     Condition: stable to PACU     Brief History: Longstanding hip pain, unresponsive to conservative treatment. The risks, benefits, and alternatives to total hip replacement were thoroughly explained. The patient understood no guarantees could be given about the outcome of the procedure and wished to proceed. Description of Procedure: After being identified in the preoperative holding area and having their operative site marked, the patient was brought back to the operating room where they underwent anesthesia to good effect. They were  placed in the supine position with a bump under the hip. The operative extremity was then prepped and draped in the usual fashion using sterile technique. Preoperative antibiotics had been administered. An appropriate time-out was performed. An incision was made 2 fingerbreadths lateral and distal to the ASIS. The skin flaps were developed down to the tensor fascia. This was divided sharply. Blunt dissection was taken medially over the tensor muscle. Retractors were placed superior and inferior to the femoral neck. The anterior fat pad was debrided. Circumflex vessels were identified and cauterized. A provisional neck cut was performed. The head was removed from the acetabulum. We then excised the labrum. We sequentially reamed for the acetabular shell. This was placed in the appropriate abduction and version. Position was confirmed by fluoroscopy . The liner was then impacted into the locking mechanism. We then turned our attention to the femur. Elevators were used to gain access to the proximal femur. Soft tissue releases were performed as necessary. The lateralizer was utilized. We then sequentially broached up to the final size trial. We placed a trial neck and head and had excellent restoration of leg length and offset with good soft tissue balance. We selected these as our final implants. Final components were inserted and the hip was reduced after thorough lavage. Restoration of appropriate leg length was confirmed by exam and fluoroscopy. We again irrigated. The tensor fascia was closed. Periarticular injection was performed. Skin closure was performed in layers. A sterile dressing was applied.  The patient was awakened, moved to the stretcher, and taken to the recovery room in stable condition. At the conclusion of the procedure, all counts were correct. There were no immediate complications. Additional Procedure:   Date: 11/30/2021    Preoperative diagnosis: LEFT HIP FRACTURE    Postoperative diagnosis: LEFT HIP FRACTURE    Procedure performed: Procedure(s):  LEFT HIP ARTHROPLASTY TOTAL ANTERIOR APPROACH    Anesthesia: General    Surgeon(s) and Role:     Dee Ferrer MD - Primary      This describes the use of intraoperative fluoroscopy. Fluoroscopic imaging was utilized in orthogonal planes as well as using live technique for all phases of the procedure, described separately in the operative report, including hardware placement.     Keisha Saunders MD

## 2021-11-30 NOTE — ANESTHESIA PREPROCEDURE EVALUATION
Relevant Problems   CARDIOVASCULAR   (+) Chronic systolic congestive heart failure (HCC)   (+) Essential hypertension   (+) Heart block   (+) Pacemaker       Anesthetic History   No history of anesthetic complications            Review of Systems / Medical History  Patient summary reviewed, nursing notes reviewed and pertinent labs reviewed    Pulmonary  Within defined limits                 Neuro/Psych   Within defined limits           Cardiovascular    Hypertension      CHF  Dysrhythmias   Pacemaker, CAD and cardiac stents    Exercise tolerance: <4 METS  Comments: Heart Block s/p AICD (8/14/17)    ECG (11/29/21):  ** Poor data quality, interpretation may be adversely affected   AV dual-paced rhythm   Biventricular pacemaker detected   When compared with ECG of 07-MAR-2010 05:49,   Vent. rate has decreased BY   2 BPM          GI/Hepatic/Renal               Comments: UTI (per UA of 11/29/21) - chronic per patient - patient self-catheterizes at home Endo/Other  Within defined limits           Other Findings   Comments: Left Hip Fracture    Left 5th rib fracture         Physical Exam    Airway  Mallampati: I  TM Distance: > 6 cm  Neck ROM: normal range of motion   Mouth opening: Normal     Cardiovascular  Regular rate and rhythm,  S1 and S2 normal,  no murmur, click, rub, or gallop             Dental    Dentition: Caps/crowns  Comments: Several missing teeth, none loose.    Pulmonary  Breath sounds clear to auscultation               Abdominal  GI exam deferred       Other Findings            Anesthetic Plan    ASA: 3  Anesthesia type: general    Monitoring Plan: BIS      Induction: Intravenous  Anesthetic plan and risks discussed with: Patient

## 2021-12-01 LAB
ANION GAP SERPL CALC-SCNC: 4 MMOL/L (ref 5–15)
BASOPHILS # BLD: 0 K/UL (ref 0–0.1)
BASOPHILS NFR BLD: 0 % (ref 0–1)
BUN SERPL-MCNC: 21 MG/DL (ref 6–20)
BUN/CREAT SERPL: 23 (ref 12–20)
CALCIUM SERPL-MCNC: 9.1 MG/DL (ref 8.5–10.1)
CHLORIDE SERPL-SCNC: 106 MMOL/L (ref 97–108)
CO2 SERPL-SCNC: 25 MMOL/L (ref 21–32)
CREAT SERPL-MCNC: 0.93 MG/DL (ref 0.7–1.3)
DIFFERENTIAL METHOD BLD: ABNORMAL
EOSINOPHIL # BLD: 0 K/UL (ref 0–0.4)
EOSINOPHIL NFR BLD: 0 % (ref 0–7)
ERYTHROCYTE [DISTWIDTH] IN BLOOD BY AUTOMATED COUNT: 13.8 % (ref 11.5–14.5)
GLUCOSE SERPL-MCNC: 189 MG/DL (ref 65–100)
HCT VFR BLD AUTO: 35.4 % (ref 36.6–50.3)
HGB BLD-MCNC: 11.4 G/DL (ref 12.1–17)
IMM GRANULOCYTES # BLD AUTO: 0.2 K/UL (ref 0–0.04)
IMM GRANULOCYTES NFR BLD AUTO: 1 % (ref 0–0.5)
LYMPHOCYTES # BLD: 0.8 K/UL (ref 0.8–3.5)
LYMPHOCYTES NFR BLD: 5 % (ref 12–49)
MCH RBC QN AUTO: 32.3 PG (ref 26–34)
MCHC RBC AUTO-ENTMCNC: 32.2 G/DL (ref 30–36.5)
MCV RBC AUTO: 100.3 FL (ref 80–99)
MONOCYTES # BLD: 1.2 K/UL (ref 0–1)
MONOCYTES NFR BLD: 8 % (ref 5–13)
NEUTS SEG # BLD: 12.8 K/UL (ref 1.8–8)
NEUTS SEG NFR BLD: 86 % (ref 32–75)
NRBC # BLD: 0 K/UL (ref 0–0.01)
NRBC BLD-RTO: 0 PER 100 WBC
PLATELET # BLD AUTO: 154 K/UL (ref 150–400)
PMV BLD AUTO: 11 FL (ref 8.9–12.9)
POTASSIUM SERPL-SCNC: 4.1 MMOL/L (ref 3.5–5.1)
RBC # BLD AUTO: 3.53 M/UL (ref 4.1–5.7)
RBC MORPH BLD: ABNORMAL
SODIUM SERPL-SCNC: 135 MMOL/L (ref 136–145)
WBC # BLD AUTO: 15 K/UL (ref 4.1–11.1)

## 2021-12-01 PROCEDURE — 94760 N-INVAS EAR/PLS OXIMETRY 1: CPT

## 2021-12-01 PROCEDURE — 36415 COLL VENOUS BLD VENIPUNCTURE: CPT

## 2021-12-01 PROCEDURE — 65660000000 HC RM CCU STEPDOWN

## 2021-12-01 PROCEDURE — 74011250637 HC RX REV CODE- 250/637: Performed by: GENERAL ACUTE CARE HOSPITAL

## 2021-12-01 PROCEDURE — 74011000258 HC RX REV CODE- 258: Performed by: GENERAL ACUTE CARE HOSPITAL

## 2021-12-01 PROCEDURE — 97110 THERAPEUTIC EXERCISES: CPT

## 2021-12-01 PROCEDURE — 97530 THERAPEUTIC ACTIVITIES: CPT

## 2021-12-01 PROCEDURE — 97161 PT EVAL LOW COMPLEX 20 MIN: CPT

## 2021-12-01 PROCEDURE — 74011250637 HC RX REV CODE- 250/637: Performed by: PHYSICIAN ASSISTANT

## 2021-12-01 PROCEDURE — 85025 COMPLETE CBC W/AUTO DIFF WBC: CPT

## 2021-12-01 PROCEDURE — 74011250637 HC RX REV CODE- 250/637: Performed by: INTERNAL MEDICINE

## 2021-12-01 PROCEDURE — 74011250636 HC RX REV CODE- 250/636: Performed by: PHYSICIAN ASSISTANT

## 2021-12-01 PROCEDURE — 80048 BASIC METABOLIC PNL TOTAL CA: CPT

## 2021-12-01 PROCEDURE — 97116 GAIT TRAINING THERAPY: CPT

## 2021-12-01 PROCEDURE — 97165 OT EVAL LOW COMPLEX 30 MIN: CPT

## 2021-12-01 PROCEDURE — 74011250637 HC RX REV CODE- 250/637: Performed by: ORTHOPAEDIC SURGERY

## 2021-12-01 PROCEDURE — 74011250636 HC RX REV CODE- 250/636: Performed by: GENERAL ACUTE CARE HOSPITAL

## 2021-12-01 PROCEDURE — 74011000250 HC RX REV CODE- 250: Performed by: PHYSICIAN ASSISTANT

## 2021-12-01 RX ORDER — TETRAHYDROZOLINE HCL 0.05 %
2 DROPS OPHTHALMIC (EYE) 3 TIMES DAILY
Status: DISCONTINUED | OUTPATIENT
Start: 2021-12-01 | End: 2021-12-02 | Stop reason: HOSPADM

## 2021-12-01 RX ORDER — FAMOTIDINE 20 MG/1
20 TABLET, FILM COATED ORAL 2 TIMES DAILY
Qty: 60 TABLET | Refills: 0 | Status: SHIPPED | OUTPATIENT
Start: 2021-12-01 | End: 2021-12-31

## 2021-12-01 RX ORDER — ASPIRIN 81 MG/1
81 TABLET ORAL 2 TIMES DAILY
Qty: 60 TABLET | Refills: 0 | Status: SHIPPED | OUTPATIENT
Start: 2021-12-01 | End: 2021-12-02

## 2021-12-01 RX ADMIN — OXYCODONE 5 MG: 5 TABLET ORAL at 13:22

## 2021-12-01 RX ADMIN — OXYCODONE 10 MG: 5 TABLET ORAL at 03:46

## 2021-12-01 RX ADMIN — Medication 1 AMPULE: at 08:54

## 2021-12-01 RX ADMIN — Medication 10 ML: at 15:15

## 2021-12-01 RX ADMIN — TETRAHYDROZOLINE HCL 2 DROP: 0.05 SOLUTION/ DROPS OPHTHALMIC at 17:31

## 2021-12-01 RX ADMIN — ACETAMINOPHEN 650 MG: 325 TABLET ORAL at 00:48

## 2021-12-01 RX ADMIN — TETRAHYDROZOLINE HCL 2 DROP: 0.05 SOLUTION/ DROPS OPHTHALMIC at 15:15

## 2021-12-01 RX ADMIN — METOPROLOL SUCCINATE 12.5 MG: 25 TABLET, EXTENDED RELEASE ORAL at 08:53

## 2021-12-01 RX ADMIN — SACUBITRIL AND VALSARTAN 1 TABLET: 24; 26 TABLET, FILM COATED ORAL at 17:32

## 2021-12-01 RX ADMIN — DOCUSATE SODIUM 50MG AND SENNOSIDES 8.6MG 1 TABLET: 8.6; 5 TABLET, FILM COATED ORAL at 08:52

## 2021-12-01 RX ADMIN — CELECOXIB 200 MG: 100 CAPSULE ORAL at 08:52

## 2021-12-01 RX ADMIN — OXYCODONE 5 MG: 5 TABLET ORAL at 22:15

## 2021-12-01 RX ADMIN — FAMOTIDINE 20 MG: 20 TABLET, FILM COATED ORAL at 08:52

## 2021-12-01 RX ADMIN — CELECOXIB 200 MG: 100 CAPSULE ORAL at 17:31

## 2021-12-01 RX ADMIN — ACETAMINOPHEN 650 MG: 325 TABLET ORAL at 06:49

## 2021-12-01 RX ADMIN — ASPIRIN 81 MG: 81 TABLET, COATED ORAL at 08:52

## 2021-12-01 RX ADMIN — DULOXETINE HYDROCHLORIDE 30 MG: 30 CAPSULE, DELAYED RELEASE ORAL at 08:52

## 2021-12-01 RX ADMIN — SACUBITRIL AND VALSARTAN 1 TABLET: 24; 26 TABLET, FILM COATED ORAL at 08:53

## 2021-12-01 RX ADMIN — OXYCODONE 5 MG: 5 TABLET ORAL at 00:48

## 2021-12-01 RX ADMIN — ASPIRIN 81 MG: 81 TABLET, COATED ORAL at 17:31

## 2021-12-01 RX ADMIN — Medication 10 ML: at 22:18

## 2021-12-01 RX ADMIN — DEXAMETHASONE SODIUM PHOSPHATE 10 MG: 4 INJECTION INTRA-ARTICULAR; INTRALESIONAL; INTRAMUSCULAR; INTRAVENOUS; SOFT TISSUE at 08:54

## 2021-12-01 RX ADMIN — FAMOTIDINE 20 MG: 20 TABLET, FILM COATED ORAL at 17:32

## 2021-12-01 RX ADMIN — Medication 1 AMPULE: at 23:11

## 2021-12-01 RX ADMIN — ACETAMINOPHEN 650 MG: 325 TABLET ORAL at 22:15

## 2021-12-01 RX ADMIN — OXYCODONE 5 MG: 5 TABLET ORAL at 06:49

## 2021-12-01 RX ADMIN — POLYETHYLENE GLYCOL 3350 17 G: 17 POWDER, FOR SOLUTION ORAL at 08:51

## 2021-12-01 RX ADMIN — OXYCODONE 5 MG: 5 TABLET ORAL at 17:32

## 2021-12-01 RX ADMIN — CEFTRIAXONE 1 G: 1 INJECTION, POWDER, FOR SOLUTION INTRAMUSCULAR; INTRAVENOUS at 11:12

## 2021-12-01 RX ADMIN — CEFAZOLIN SODIUM 2 G: 1 INJECTION, POWDER, FOR SOLUTION INTRAMUSCULAR; INTRAVENOUS at 06:32

## 2021-12-01 RX ADMIN — DOCUSATE SODIUM 50MG AND SENNOSIDES 8.6MG 1 TABLET: 8.6; 5 TABLET, FILM COATED ORAL at 17:32

## 2021-12-01 RX ADMIN — ACETAMINOPHEN 650 MG: 325 TABLET ORAL at 17:32

## 2021-12-01 RX ADMIN — POTASSIUM CHLORIDE 10 MEQ: 750 TABLET, FILM COATED, EXTENDED RELEASE ORAL at 08:52

## 2021-12-01 NOTE — PROGRESS NOTES
ORTHO - Progress Note  Post Op day: 1 Day Post-Op    Shawn Cochran     [id number]  male    68 y.o.    1945    Admit date:2021  Date of Surgery:2021   Procedures:Procedure(s):  LEFT HIP ARTHROPLASTY TOTAL ANTERIOR APPROACH  Surgeon:Surgeon(s) and Role:     Dee Ferrer MD - Primary        SUBJECTIVE:     Shawn Cochran is a 68 y.o. male resting in the chair. Patient has complaints of appropriate post-op pain, tolerating PO pain medications oxycodone. Denies F/C, nausea, vomiting, dizziness, lightheadedness, chest pain, or shortness of breath. OBJECTIVE:       Physical Exam:  General: alert, cooperative, no distress. Gastrointestinal:  non-distended . Cardiovascular: equal pulses in the lower extremities,  Brisk cap refill in all distal extremities   Genitourinary:Voiding independently   Respiratory: No respiratory distress   Neurological:Neurovascular exam within normal limits. Senstion intact: LE bilat. Motor: + DF/PF/EHL. Musculoskeletal: Samantha's sign negative in bilateral lower extremities. Calves soft, supple, non-tender upon palpation or with passive stretch. No sign of DVT on exam. Ankles/toes up and down  Dressing/Wound:  Clean, dry and intact over anterior hip. No significant erythema or swelling.     Vital Signs:       Patient Vitals for the past 8 hrs:   BP Temp Pulse Resp SpO2   21 0846 105/66 98.6 °F (37 °C) 69 18 96 %                                          Temp (24hrs), Av.1 °F (36.7 °C), Min:97.8 °F (36.6 °C), Max:98.6 °F (37 °C)    Date 21 0700 - 21 0659   Shift 1162-3073 7348-1295 1073-2878 24 Hour Total   INTAKE   Shift Total(mL/kg)       OUTPUT   Urine(mL/kg/hr) 1150   1150   Shift Total(mL/kg) 1150(11.1)   1150(11.1)   Weight (kg) 103.4 103.4 103.4 103.4     Labs:        Recent Labs     21  0322   HCT 35.4*   HGB 11.4*     PT/OT:        Gait  Base of Support: Narrowed  Speed/Shelley: Slow  Step Length: Right shortened  Stance: Left decreased  Gait Abnormalities: Decreased step clearance  Ambulation - Level of Assistance: Contact guard assistance  Distance (ft): 25 Feet (ft)  Assistive Device: Gait belt, Walker, rolling     ASSESSMENT / PLAN:   Active Problems:    Hip fracture ([address] (11/29/2021)         -  Continue PT/OT WBAT; doing well today  -  DVT prophylaxis- SCD w/ ASA 81 mg BID for 30 days  - Takes hydrocodone 5mg BID at baseline and feels comfortable taking thi for pain management outpatient  -  DC planning -Likely home tomorrow if continues to do well.     Signed By: ELVIN Pizarro

## 2021-12-01 NOTE — ACP (ADVANCE CARE PLANNING)
Advance Care Planning   Advance Care Planning Inpatient Note  [address]  [facility] Department    Today's Date: 2021  Unit: [facility]    Received request from Yasmin Waddell Upon review of chart and communication with care team, patient's decision making abilities are not in question. Patient was present in the room during visit. Goals of ACP Conversation:  Discuss Advance Care planning documents  Establish and document healthcare decision maker    Health Care Decision Makers:      Primary Decision Maker: Lajean Goodpasture - [phone]      Summary:  Verified Healthcare Decision Maker  Documented Next of Kin, per patient report    Advance Care Planning Documents (Patient Wishes) on file:  None     Assessment:    Responded to care manager request to visit patient for advance care planning. Patient was  39 years before his wife . He and his current wife have been  about two years. His stepdaughter  in a hospital in [place] about six months ago. He alluded to general distrust in business of healthcare. Explained purpose of Advance Medical Directive, discussed state hierarchy of healthcare decision makers, explained opportunity to document end of life care decisions as well discussing anatomical gifts. Patient expressed that he and his wife have had discussions about medical care they would/would not want in certain circumstances. His wife is who he would designate as his medical decision maker as she is his legal next of kin. At this time, he feels no need to complete the Advance Medical Directive. Affirmed that ongoing conversation with his wife about acceptable vs unacceptable interventions is of great value. Patient expressed understanding.         Interventions:  Provided education on documents for clarity and greater understanding  Discussed and provided education on state decision maker hierarchy  Encouraged ongoing ACP conversation with future decision makers and loved ones  Reviewed but did not complete ACP document    Care Preferences Communicated:  No    Outcomes/Plan:  ACP Discussion Completed  Designated wife primary decision maker as she is legal next of kin    Asya Lee [facility] on 12/1/2021 at 11:41 AM

## 2021-12-01 NOTE — PROGRESS NOTES
End of Shift Note    Bedside shift change report given to [facility] LPN (oncoming nurse) by Vidhi Diaz RN (offgoing nurse). Report included the following information SBAR, Kardex, Intake/Output, MAR, Recent Results and Cardiac Rhythm AVpaced    Shift worked:  day     Shift summary and any significant changes:     pt self cathed today. Pt self caths chronically at home. Concerns for physician to address:       Zone phone for oncoming shift:   [phone]       Activity:  Activity Level: Up with Assistance  Number times ambulated in hallways past shift: 1  Number of times OOB to chair past shift: 4    Cardiac:   Cardiac Monitoring: Yes      Cardiac Rhythm: AV Paced    Access:   Current line(s): PIV     Genitourinary:   Urinary status: self cath    Respiratory:   O2 Device: None (Room air)  Chronic home O2 use?: NO  Incentive spirometer at bedside: YES     GI:  Last Bowel Movement Date: 11/28/21  Current diet:  ADULT DIET Regular  Passing flatus: YES  Tolerating current diet: YES       Pain Management:   Patient states pain is manageable on current regimen: YES    Skin:  Dario Score: 19  Interventions: float heels, increase time out of bed, PT/OT consult and limit briefs    Patient Safety:  Fall Score:  Total Score: 4  Interventions: assistive device (walker, cane, etc), gripper socks, pt to call before getting OOB and stay with me (per policy)  High Fall Risk: Yes    Length of Stay:  Expected LOS: 6d 4h  Actual LOS: 2      Ibeth Carnes RN

## 2021-12-01 NOTE — PROGRESS NOTES
Problem: Falls - Risk of  Goal: *Absence of Falls  Description: Document Essie Fall Risk and appropriate interventions in the flowsheet. Outcome: Progressing Towards Goal  Note: Fall Risk Interventions:  Mobility Interventions: Communicate number of staff needed for ambulation/transfer         Medication Interventions: Evaluate medications/consider consulting pharmacy, Patient to call before getting OOB    Elimination Interventions: Call light in reach, Patient to call for help with toileting needs    History of Falls Interventions: Consult care management for discharge planning, Evaluate medications/consider consulting pharmacy         Problem: Pressure Injury - Risk of  Goal: *Prevention of pressure injury  Description: Document Dario Scale and appropriate interventions in the flowsheet. Outcome: Progressing Towards Goal  Note: Pressure Injury Interventions:  Sensory Interventions: Assess changes in LOC, Float heels         Activity Interventions: Increase time out of bed, PT/OT evaluation    Mobility Interventions: Float heels, HOB 30 degrees or less, PT/OT evaluation, Turn and reposition approx.  every two hours(pillow and wedges)    Nutrition Interventions: Document food/fluid/supplement intake                     Problem: Hip Replacement: Post Op Day 1  Goal: Psychosocial  Outcome: Progressing Towards Goal  Goal: *Optimal pain control at patient's stated goal  Outcome: Progressing Towards Goal

## 2021-12-01 NOTE — PROGRESS NOTES
Spiritual Care Assessment/Progress Note  [facility]      NAME: Dede King      MRN: [id number]  AGE: 68 y.o.  SEX: male  Jainism Affiliation: Tenriism   Language: English     12/1/2021     Total Time (in minutes): 33     Spiritual Assessment begun in [facility] through conversation with:         [x]Patient        [] Family    [] Friend(s)        Reason for Consult: Advance medical directive consult     Spiritual beliefs: (Please include comment if needed)     [x] Identifies with a claudia tradition:  Tenriism        [x] Supported by a claudia community: [employer]           [] Claims no spiritual orientation:           [] Seeking spiritual identity:                [] Adheres to an individual form of spirituality:           [] Not able to assess:                           Identified resources for coping:      [x] Prayer                               [x] Music                  [] Guided Imagery     [x] Family/friends                 [] Pet visits     [] Devotional reading                         [] Unknown     [] Other:                                         Interventions offered during this visit: (See comments for more details)    Patient Interventions: Affirmation of emotions/emotional suffering, Affirmation of claudia, Catharsis/review of pertinent events in supportive environment, Iconic (affirming the presence of God/Higher Power), Initial/Spiritual assessment, patient floor, Normalization of emotional/spiritual concerns, End of life issues discussed, Prayer (assurance of), Jainism beliefs/image of God discussed           Plan of Care:     [] Support spiritual and/or cultural needs    [] Support AMD and/or advance care planning process      [] Support grieving process   [] Coordinate Rites and/or Rituals    [] Coordination with community clergy   [x] No spiritual needs identified at this time   [] Detailed Plan of Care below (See Comments)  [] Make referral to Music Therapy  [] Make referral to Pet Therapy     [] Make referral to Addiction services  [] Make referral to [facility]  [] Make referral to Spiritual Care Partner  [] No future visits requested        [x] Contact Spiritual Care for further referrals     Comments:   ACP Assessment:    Responded to care manager request to visit patient for advance care planning. Patient was  39 years before his wife . He and his current wife have been  about two years. His stepdaughter  in a hospital in [place] about six months ago. He alluded to general distrust in business of healthcare. Explained purpose of Advance Medical Directive, discussed state hierarchy of healthcare decision makers, explained opportunity to document end of life care decisions as well discussing anatomical gifts. Patient expressed that he and his wife have had discussions about medical care they would/would not want in certain circumstances. His wife is who he would designate as his medical decision maker as she is his legal next of kin. At this time, he feels no need to complete the Advance Medical Directive. Affirmed that ongoing conversation with his wife about acceptable vs unacceptable interventions is of great value. Patient expressed understanding. Initiated spiritual assessment during visit. Patient is retired ; now sings in the choir. He and his wife are members at [employer]. He expressed no spiritual needs or concerns during visit but was receptive to assurance of prayer. Advised of ongoing availability of pastoral support.      GUSTAVO Cooper, [facility], Staff [address]    [address] Paging Service  [phone])

## 2021-12-01 NOTE — PROGRESS NOTES
Problem: Falls - Risk of  Goal: *Absence of Falls  Description: Document Ramya Alanis Fall Risk and appropriate interventions in the flowsheet. Outcome: Progressing Towards Goal  Note: Fall Risk Interventions:  Mobility Interventions: Assess mobility with egress test, Communicate number of staff needed for ambulation/transfer, OT consult for ADLs, Patient to call before getting OOB, PT Consult for mobility concerns, PT Consult for assist device competence, Strengthening exercises (ROM-active/passive), Utilize walker, cane, or other assistive device, Utilize gait belt for transfers/ambulation         Medication Interventions: Assess postural VS orthostatic hypotension, Evaluate medications/consider consulting pharmacy, Patient to call before getting OOB, Teach patient to arise slowly, Utilize gait belt for transfers/ambulation    Elimination Interventions: Call light in reach, Elevated toilet seat, Patient to call for help with toileting needs, Stay With Me (per policy), Toilet paper/wipes in reach, Toileting schedule/hourly rounds, Urinal in reach    History of Falls Interventions: Consult care management for discharge planning, Door open when patient unattended, Evaluate medications/consider consulting pharmacy, Room close to nurse's station, Utilize gait belt for transfer/ambulation, Assess for delayed presentation/identification of injury for 48 hrs (comment for end date), Vital signs minimum Q4HRs X 24 hrs (comment for end date)         Problem: Patient Education: Go to Patient Education Activity  Goal: Patient/Family Education  Outcome: Progressing Towards Goal     Problem: Pressure Injury - Risk of  Goal: *Prevention of pressure injury  Description: Document Dario Scale and appropriate interventions in the flowsheet.   Outcome: Progressing Towards Goal  Note: Pressure Injury Interventions:  Sensory Interventions: Assess changes in LOC, Assess need for specialty bed, Avoid rigorous massage over bony prominences, Chair cushion, Check visual cues for pain, Float heels, Discuss PT/OT consult with provider, Keep linens dry and wrinkle-free, Maintain/enhance activity level, Minimize linen layers, Monitor skin under medical devices, Pad between skin to skin    Moisture Interventions: Absorbent underpads, Apply protective barrier, creams and emollients, Assess need for specialty bed, Check for incontinence Q2 hours and as needed, Contain wound drainage, Limit adult briefs, Maintain skin hydration (lotion/cream), Minimize layers, Offer toileting Q_hr, Moisture barrier    Activity Interventions: Assess need for specialty bed, Chair cushion, Increase time out of bed, Pressure redistribution bed/mattress(bed type), PT/OT evaluation    Mobility Interventions: Assess need for specialty bed, Chair cushion, Float heels, Pressure redistribution bed/mattress (bed type), PT/OT evaluation, HOB 30 degrees or less    Nutrition Interventions: Document food/fluid/supplement intake, Discuss nutritional consult with provider, Offer support with meals,snacks and hydration    Friction and Shear Interventions: Apply protective barrier, creams and emollients, Feet elevated on foot rest, Foam dressings/transparent film/skin sealants, HOB 30 degrees or less, Lift sheet, Minimize layers, Lift team/patient mobility team                Problem: Patient Education: Go to Patient Education Activity  Goal: Patient/Family Education  Outcome: Progressing Towards Goal     Problem: Pain  Goal: *Control of Pain  Outcome: Progressing Towards Goal     Problem: Patient Education: Go to Patient Education Activity  Goal: Patient/Family Education  Outcome: Progressing Towards Goal     Problem: Hip Replacement: Day of Surgery/Unit  Goal: Off Pathway (Use only if patient is Off Pathway)  Outcome: Progressing Towards Goal  Goal: Activity/Safety  Outcome: Progressing Towards Goal  Goal: Consults, if ordered  Outcome: Progressing Towards Goal  Goal: Diagnostic Test/Procedures  Outcome: Progressing Towards Goal  Goal: Nutrition/Diet  Outcome: Progressing Towards Goal  Goal: Medications  Outcome: Progressing Towards Goal  Goal: Respiratory  Outcome: Progressing Towards Goal  Goal: Treatments/Interventions/Procedures  Outcome: Progressing Towards Goal  Goal: Psychosocial  Outcome: Progressing Towards Goal  Goal: *Initiate mobility  Outcome: Progressing Towards Goal  Goal: *Optimal pain control at patient's stated goal  Outcome: Progressing Towards Goal  Goal: *Hemodynamically stable  Outcome: Progressing Towards Goal     Problem: Hip Replacement: Post Op Day 1  Goal: Off Pathway (Use only if patient is Off Pathway)  Outcome: Progressing Towards Goal  Goal: Activity/Safety  Outcome: Progressing Towards Goal  Goal: Diagnostic Test/Procedures  Outcome: Progressing Towards Goal  Goal: Nutrition/Diet  Outcome: Progressing Towards Goal  Goal: Medications  Outcome: Progressing Towards Goal  Goal: Respiratory  Outcome: Progressing Towards Goal  Goal: Treatments/Interventions/Procedures  Outcome: Progressing Towards Goal  Goal: Psychosocial  Outcome: Progressing Towards Goal  Goal: Discharge Planning  Outcome: Progressing Towards Goal  Goal: *Demonstrates progressive activity  Outcome: Progressing Towards Goal  Goal: *Optimal pain control at patient's stated goal  Outcome: Progressing Towards Goal  Goal: *Hemodynamically stable  Outcome: Progressing Towards Goal  Goal: *Discharge plan identified  Outcome: Progressing Towards Goal     Problem: Hip Replacement: Post-Op Day 2  Goal: Off Pathway (Use only if patient is Off Pathway)  Outcome: Progressing Towards Goal  Goal: Activity/Safety  Outcome: Progressing Towards Goal  Goal: Diagnostic Test/Procedures  Outcome: Progressing Towards Goal  Goal: Nutrition/Diet  Outcome: Progressing Towards Goal  Goal: Medications  Outcome: Progressing Towards Goal  Goal: Respiratory  Outcome: Progressing Towards Goal  Goal: Treatments/Interventions/Procedures  Outcome: Progressing Towards Goal  Goal: Psychosocial  Outcome: Progressing Towards Goal  Goal: *Met physical therapy criteria for discharge to the next level of care  Outcome: Progressing Towards Goal  Goal: *Optimal pain control with oral analgesia  Outcome: Progressing Towards Goal  Goal: *Hemodynamically stable  Outcome: Progressing Towards Goal  Goal: *Tolerating diet  Outcome: Progressing Towards Goal  Goal: *Verbalizes understanding of any indicated hip precautions  Outcome: Progressing Towards Goal  Goal: *Patient verbalizes understanding of discharge instructions  Outcome: Progressing Towards Goal

## 2021-12-01 NOTE — PROGRESS NOTES
Problem: Mobility Impaired (Adult and Pediatric)  Goal: *Acute Goals and Plan of Care (Insert Text)  Description: FUNCTIONAL STATUS PRIOR TO ADMISSION: Patient was modified independent using a walking stick for functional mobility. Reports pre-op limitations from back and left hip pain along with SOLARES. HOME SUPPORT PRIOR TO ADMISSION: The patient lived with his supportive wife but did not require assist. Wife is a retired RN. Physical Therapy Goals  Initiated 12/1/2021    1. Patient will move from supine to sit and sit to supine  in bed with modified independence within 4 days. 2. Patient will perform sit to stand with modified independence within 4 days. 3. Patient will ambulate with modified independence for 75 feet with the least restrictive device within 4 days. 4. Patient will ascend/descend 2 stairs with 0 handrail(s) with modified independence within 4 days. 5. Patient will perform anterior hip home exercise program per protocol with modified independence within 4 days. 12/1/2021 1550 by Gilmer Roman, PT, DPT  Outcome: Progressing Towards Goal  12/1/2021 1122 by Gilmer Roman, PT, DPT  Outcome: Progressing Towards Goal   PHYSICAL THERAPY TREATMENT  Patient: Diane Lowe (19 y.o. male)  Date: 12/1/2021  Diagnosis: Hip fracture ([address] Isis Kilgore   <principal problem not specified>  Procedure(s) (LRB):  LEFT HIP ARTHROPLASTY TOTAL ANTERIOR APPROACH (Left) 1 Day Post-Op  Precautions: WBAT, Total hip  Chart, physical therapy assessment, plan of care and goals were reviewed. ASSESSMENT  Patient continues with skilled PT services and is progressing towards goals. Patient received in chair visiting with his wife. Continued progression of gait with RW x70' requiring CGA for device management and safety. Patient required cues for sequencing device and to maintain posture during gait training. Discussed progression toward discharge home and clearance.  The patient's wife expressed concern following admission for a GLF with no known cause and for his ability to manage stairs. Progressing appropriately towards goals and will plan to further progress gait and assess home stairs in the am. He will need a home RW and recommend HHPT follow up. Current Level of Function Impacting Discharge (mobility/balance): CGA for transfers; cues for gait mechanics             PLAN :  Patient continues to benefit from skilled intervention to address the above impairments. Continue treatment per established plan of care. to address goals. Recommendation for discharge: (in order for the patient to meet his/her long term goals)  Physical therapy at least 2 days/week in the home     This discharge recommendation:  Has been made in collaboration with the attending provider and/or case management    IF patient discharges home will need the following DME: rolling walker       SUBJECTIVE:       OBJECTIVE DATA SUMMARY:   Critical Behavior:  Neurologic State: Alert  Orientation Level: Oriented X4        Functional Mobility Training:  Bed Mobility:  Rolling: Supervision  Supine to Sit: Supervision     Scooting: Contact guard assistance        Transfers:  Sit to Stand: Contact guard assistance  Stand to Sit: Contact guard assistance                             Balance:  Sitting: Intact  Standing: Intact; With support  Ambulation/Gait Training:  Distance (ft): 70 Feet (ft) with 1 standing rest break d/t SOLARES  Assistive Device: Gait belt; Walker, rolling  Ambulation - Level of Assistance: Contact guard assistance     Gait Description (WDL): Exceptions to WDL  Gait Abnormalities: Decreased step clearance        Base of Support: Narrowed  Stance: Left decreased  Speed/Shelley: Slow  Step Length: Right shortened                    Stairs:               Therapeutic Exercises:       Pain Rating:  3/10 left hip    Activity Tolerance:   Good; mild SOLARES and SpO2 >93% on RA during gait      After treatment patient left in no apparent distress:   Sitting in chair and Call bell within reach    COMMUNICATION/COLLABORATION:   The patients plan of care was discussed with: Registered nurse.      Yohannes Schuler PT, DPT   Time Calculation: 24 mins

## 2021-12-01 NOTE — PROGRESS NOTES
End of Shift Note    Bedside shift change report given to  (oncoming nurse) by Redd Quick RN (offgoing nurse). Report included the following information SBAR, Kardex, Procedure Summary, Intake/Output, MAR and Recent Results    Shift worked:  night     Shift summary and any significant changes:     no significant change. Prn pain meds given     Concerns for physician to address:  requesting something for dry eyes     Zone phone for oncoming shift:          Activity:  Activity Level: Bed Rest  Number times ambulated in hallways past shift: 0  Number of times OOB to chair past shift: 0    Cardiac:   Cardiac Monitoring: No      Cardiac Rhythm: AV Paced    Access:   Current line(s): PIV     Genitourinary:   Urinary status: camp    Respiratory:   O2 Device: None (Room air)  Chronic home O2 use?: NO  Incentive spirometer at bedside: YES     GI:  Last Bowel Movement Date: 11/28/21  Current diet:  ADULT DIET Regular  Passing flatus: YES  Tolerating current diet: YES       Pain Management:   Patient states pain is manageable on current regimen: YES    Skin:  Dario Score: 19  Interventions: increase time out of bed and PT/OT consult    Patient Safety:  Fall Score:  Total Score: 3  Interventions: assistive device (walker, cane, etc) and pt to call before getting OOB  High Fall Risk: Yes    Length of Stay:  Expected LOS: - - -  Actual LOS: 2      Redd Quick RN

## 2021-12-01 NOTE — PROGRESS NOTES
Dr. Loretta aguilar served per pt request for eye drops for his dry eyes. Call back number [phone] provided.

## 2021-12-01 NOTE — PROGRESS NOTES
Hospitalist Progress Note    NAME: Tania Strong   :  1945   MRN:  [id number]       Assessment / Plan:  Hip fracture from GLF  S/ left Total Hip Arthroplasty, Direct Anterior Approach  DVT prophylaxis and pain management per ortho   PT/OT as per protocol    Hx of chronic systolic HF, EF 74% s//p AICD  Hx of congenital heart block s/p PM.    HTN  No recent cardiac events, no hx of stent placement. No recent admission for heart failure. Currently euvolemic. Cont' home medications. Monitor fluid status. Pt follows at the [address] to help with med rec     L 5th rib fracture  Nondisplaced. Aggressive pulmonary toilet. Pain control. O2 suppl prn       Code Status: Full   Surrogate Decision Maker: pt's wife  DVT Prophylaxis: SCD for now  GI Prophylaxis: not indicated  Baseline:     25.0 - 29.9 Overweight / Body mass index is 26.35 kg/m². Estimated discharge date: dec 2nd   Barriers:pt/ot          Subjective:     Chief Complaint / Reason for Physician Visit  Fu hip fracture . Seen after surgery . Discussed with RN events overnight. Review of Systems:  Symptom Y/N Comments  Symptom Y/N Comments   Fever/Chills n   Chest Pain n    Poor Appetite    Edema     Cough    Abdominal Pain n    Sputum    Joint Pain     SOB/SOLARES n   Pruritis/Rash     Nausea/vomit n   Tolerating PT/OT     Diarrhea n   Tolerating Diet     Constipation    Other       Could NOT obtain due to:      Objective:     VITALS:   Last 24hrs VS reviewed since prior progress note.  Most recent are:  Patient Vitals for the past 24 hrs:   Temp Pulse Resp BP SpO2   21 0846 98.6 °F (37 °C) 69 18 105/66 96 %   21 0258 98.4 °F (36.9 °C) 71 18 111/71 94 %   21 2339 98.2 °F (36.8 °C) 69 18 100/63 94 %   21 -- -- 18 94/64 95 %   21 1736 98 °F (36.7 °C) 78 19 114/66 94 %   21 1636 97.8 °F (36.6 °C) 82 16 97/61 94 %   21 1536 97.8 °F (36.6 °C) 73 18 109/60 97 %   21 1424 98.2 °F (36.8 °C) 72 16 104/65 95 %   11/30/21 1400 98.1 °F (36.7 °C) 72 16 (!) 103/54 91 %   11/30/21 1345 -- 71 14 (!) 103/56 97 %   11/30/21 1330 -- 75 15 102/60 97 %   11/30/21 1315 -- 70 15 (!) 96/56 99 %   11/30/21 1310 -- 80 20 (!) 86/53 97 %   11/30/21 1305 -- 74 17 (!) 89/48 98 %   11/30/21 1301 97.9 °F (36.6 °C) 72 17 (!) 134/99 98 %   11/30/21 1300 -- 82 13 (!) 87/51 99 %   11/30/21 1100 -- 70 21 116/69 92 %       Intake/Output Summary (Last 24 hours) at 12/1/2021 1027  Last data filed at 12/1/2021 2426  Gross per 24 hour   Intake 920 ml   Output 1990 ml   Net -1070 ml        I had a face to face encounter and independently examined this patient on 12/1/2021, as outlined below:  PHYSICAL EXAM:  General: WD, WN. Alert, cooperative, no acute distress    EENT:  EOMI. Anicteric sclerae. MMM  Resp:  CTA bilaterally, no wheezing or rales. No accessory muscle use  CV:  Regular  rhythm,  No edema  GI:  Soft, Non distended, Non tender. +Bowel sounds  Neurologic:  Alert and oriented X 3, normal speech,   Psych:   Good insight. Not anxious nor agitated  Skin:  No rashes. No jaundice    Reviewed most current lab test results and cultures  YES  Reviewed most current radiology test results   YES  Review and summation of old records today    NO  Reviewed patient's current orders and MAR    YES  PMH/SH reviewed - no change compared to H&P  ________________________________________________________________________  Care Plan discussed with:    Comments   Patient y    Family      RN y    Care Manager     Consultant                        Multidiciplinary team rounds were held today with , nursing, pharmacist and clinical coordinator. Patient's plan of care was discussed; medications were reviewed and discharge planning was addressed.      ________________________________________________________________________  Total NON critical care TIME:35 Minutes    Total CRITICAL CARE TIME Spent:   Minutes non procedure based      Comments >50% of visit spent in counseling and coordination of care     ________________________________________________________________________  Gina Aly MD     Procedures: see electronic medical records for all procedures/Xrays and details which were not copied into this note but were reviewed prior to creation of Plan. LABS:  I reviewed today's most current labs and imaging studies.   Pertinent labs include:  Recent Labs     12/01/21  0322 11/30/21  0337 11/29/21  1554   WBC 15.0* 10.3 8.9   HGB 11.4* 13.4 13.3   HCT 35.4* 40.9 40.3    171 168     Recent Labs     12/01/21  0322 11/29/21  1554   * 138   K 4.1 3.4*    104   CO2 25 29   * 111*   BUN 21* 21*   CREA 0.93 1.04   CA 9.1 9.0       Signed: Gina Aly MD

## 2021-12-01 NOTE — DISCHARGE INSTRUCTIONS
Discharge Instructions: How to [facility]  Surgery: Hip Fracture Repair  Surgeon:   Rasheed Phillips MD  Surgery Date:  11/30/2021     To relieve pain:   Use ice/gel packs.  Put the ice pack directly over the wound, or anywhere you are hurting or swollen.  To control pain and swelling, keep ice on regularly, especially after physical activity.  The packs should stay cold for 3-4 hours. When it is not cold anymore, rotate with the packs in the freezer.  Elevate your leg. This will also keep swelling down.  Rest for at least 20 minutes between activity or exercises.  To keep track of your pain medications, write down what you take and when you take it.  The last dose of pain medication you got in the hospital was:     Medication    Dose    Date & Time           Choose your medications based on the pain scale below:     To keep your pain under control, take Tylenol every 6 hours for 14 days - even if you feel like you dont need it.  Take your current home medications for pain control.  To prevent nausea, take your pain medications with food. Pain Scale                 As your pain lessens:     Slowly start taking less pain medication. You may do this by waiting longer between doses or by taking smaller doses.  Stop using the pain medications as soon as you no longer need it, usually in 2-3 weeks.  Aspirin   To prevent blood clots, you will need to take Aspirin 81 mg twice a day for 30 days.  To prevent stomach upset or bleeding:   Take Pepcid 20 mg twice a day, or a similar home medication, while you are taking a blood thinner.    Do not take non-steroidal anti-inflammatory (NSAID) medications (Ibuprofen, Advil, Motrin, Naproxen, etc.)                                                 OPSITE (Honeycomb dressing)     Keep your clear, waterproof dressing in place for 5-7 days after your leave the hospital.      If you are still having drainage, you will need to change your dressing in 5-7 days. You will be given one extra dressing to use at home.  If there is no more drainage from the wound, you may leave it open to the air. OPSITE DRESSING INSTRUCTIONS                     To increase and promote healing:     Stop Smoking (or at least cut back on       Smoking).  Eat a well-balanced diet (high in protein       and vitamin C).  If you have a poor appetite, drink Ensure, Glucerna, or CarnationInstant Breakfast for the next 30 days.  If you are diabetic, you should control your blood         sugars to prevent infection and help your wound         to heal.       To prevent constipation, stay active & drink plenty of fluid.  While using pain medications, you should also take stool softeners and laxatives, such as Pericolace and Miralax.  If you are having too many bowel movements, then you may need  to stop taking the laxatives.  You should have a bowel movement 3-4 days after surgery and then at least every other day while on pain medication.  To improve your recovery, you must be active!  WEIGHT BEARING   As Tolerated = You can put as much weight on your leg as you can tolerate while walking.          THERAPY   If you go to a rehab facility, physical therapy (PT) will need to work with you daily, and sometimes twice a day to teach you how to:     Get in and out of the bed   Walk (gait training) and climb stairs   Do exercises and gain strength   Use a walker, crutches or cane     You may also need to have occupational therapy (OT) work with you to help you practice:     Getting on and off the toilet   Self-care (brushing teeth, eating, bathing, etc)     If you go directly home, home health physical therapy will come the day after you leave the hospital.  They will visit about 4 times over the next couple of weeks to teach you how to get out of bed, to safely walk in your home, and to do your exercises.  NO DRIVING until your surgeon tells you it is ok.  You can return to work when cleared by a physician.  Please call your physician immediately if you have:     Constant bleeding from your wound.  Increasing redness or swelling around your wound (Some warmth, bruising and swelling is normal).  Change in wound drainage (increase in amount, color, or bad smell).  Change in mental status (unusual behavior)     Temperature over 101.5 degrees Fahrenheit     Increased pain, swelling, or redness in the calf (back of your lower leg), thigh, ankle or foot.  Emergency: CALL 911 if you have:   Shortness of breath   Chest pain when you cough or taking a deep breath     Please call your surgeons office Dr Kash Wu at [address] [place] [address] [phone] for a follow up appointment.  You should call as soon as you get home or the next day after discharge. Ask to make an appointment in 2 weeks.

## 2021-12-01 NOTE — PROGRESS NOTES
Problem: Mobility Impaired (Adult and Pediatric)  Goal: *Acute Goals and Plan of Care (Insert Text)  Description: FUNCTIONAL STATUS PRIOR TO ADMISSION: Patient was modified independent using a walking stick for functional mobility. Reports pre-op limitations from back and left hip pain along with SOLARES. HOME SUPPORT PRIOR TO ADMISSION: The patient lived with his supportive wife but did not require assist. Wife is a retired RN. Physical Therapy Goals  Initiated 12/1/2021    1. Patient will move from supine to sit and sit to supine  in bed with modified independence within 4 days. 2. Patient will perform sit to stand with modified independence within 4 days. 3. Patient will ambulate with modified independence for 75 feet with the least restrictive device within 4 days. 4. Patient will ascend/descend 2 stairs with 0 handrail(s) with modified independence within 4 days. 5. Patient will perform anterior hip home exercise program per protocol with modified independence within 4 days. Outcome: Progressing Towards Goal   PHYSICAL THERAPY EVALUATION  Patient: Olinda Starr (61 y.o. male)  Date: 12/1/2021  Primary Diagnosis: Hip fracture ([address] [S72.009A]  Procedure(s) (LRB):  LEFT HIP ARTHROPLASTY TOTAL ANTERIOR APPROACH (Left) 1 Day Post-Op   Precautions:   WBAT, Total hip    ASSESSMENT  Based on the objective data described below, the patient presents with left sided weakness, impaired balance, decreased balance, and activity tolerance following a GLF where his left LE \"gave away\" with resulting left hip fx now post anterior JOSE. He reports pre-op limitations in activity tolerance d/t back and hip pain along with SOLARES d/t a hx of CHF. He demonstrated good overall initial mobility requiring additional time and CGA to transfer to EOB. Gait training completed with a RW x30' requiring CGA for balance and device management. Noted decreased left step length and partial knee flexion during left stance phase.  Gait without LOB and no signs of buckling. Patient with moderate SOLARES upon return to chair and SpO2 borderline between 89-91% on RA. Will further monitor SpO2 during next session. Anticipate good progress towards discharge home with family support. Recommend HHPT and a RW. Current Level of Function Impacting Discharge (mobility/balance): CGA for transfers       Patient will benefit from skilled therapy intervention to address the above noted impairments. PLAN :  Recommendations and Planned Interventions: bed mobility training, transfer training, gait training, therapeutic exercises, and therapeutic activities      Frequency/Duration: Patient will be followed by physical therapy:  twice daily to address goals. Recommendation for discharge: (in order for the patient to meet his/her long term goals)  Physical therapy at least 2 days/week in the home AND ensure assist and/or supervision for safety with functional mobility    This discharge recommendation:  Has not yet been discussed the attending provider and/or case management    IF patient discharges home will need the following DME: rolling walker         SUBJECTIVE:   Patient stated It feels better than it did when I tried to walk on it two days ago!     OBJECTIVE DATA SUMMARY:   HISTORY:    Past Medical History:   Diagnosis Date    Chronic systolic congestive heart failure ([address] 8/14/2017    Essential hypertension 8/14/2017    Heart block 8/14/2017    Pacemaker 8/14/2017     Past Surgical History:   Procedure Laterality Date    HX BLADDER REPAIR      HX CORONARY STENT PLACEMENT      HX PACEMAKER         Personal factors and/or comorbidities impacting plan of care:     Home Situation  Home Environment: Private residence  # Steps to Enter: 1  One/Two Story Residence: Other (Comment) (3 story, stays on 1st)  # of Interior Steps: 1  Height of Each Step (in): 6 inches  Interior Rails: None  Lift Chair Available: No  Living Alone: No  Support Systems: Spouse/Significant Other, Yazidi/Kristy Community, Friend/Neighbor  Patient Expects to be Discharged to[de-identified] [employer]  Current DME Used/Available at Home: Shower chair  Tub or Shower Type: Tub/Shower combination (walk in tub/shower - handicap)    EXAMINATION/PRESENTATION/DECISION MAKING:   Critical Behavior:  Neurologic State: Alert  Orientation Level: Oriented X4        Hearing: Auditory  Auditory Impairment: None  Hearing Aids/Status: Does not own  Skin:    Edema:   Range Of Motion:  AROM: Within functional limits                       Strength:    Strength: Generally decreased, functional                    Tone & Sensation:   Tone: Normal              Sensation: Intact               Coordination:  Coordination: Within functional limits  Vision:      Functional Mobility:  Bed Mobility:  Rolling: Supervision  Supine to Sit: Supervision     Scooting: Contact guard assistance  Transfers:  Sit to Stand: Contact guard assistance  Stand to Sit: Contact guard assistance                       Balance:   Sitting: Intact  Standing: Intact;  With support  Ambulation/Gait Training:  Distance (ft): 30 Feet (ft)  Assistive Device: Gait belt; Walker, rolling  Ambulation - Level of Assistance: Contact guard assistance     Gait Description (WDL): Exceptions to WDL  Gait Abnormalities: Decreased step clearance        Base of Support: Narrowed  Stance: Left decreased  Speed/Shelley: Slow  Step Length: Right shortened             Therapeutic Exercises:   Supine ankle pumps, quad sets, glute sets x10 each         Physical Therapy Evaluation Charge Determination   History Examination Presentation Decision-Making   MEDIUM  Complexity : 1-2 comorbidities / personal factors will impact the outcome/ POC  MEDIUM Complexity : 3 Standardized tests and measures addressing body structure, function, activity limitation and / or participation in recreation  LOW Complexity : Stable, uncomplicated  LOW Complexity : FOTO score of       Based on the above components, the patient evaluation is determined to be of the following complexity level: LOW     Pain Ratin/10 left hip    Activity Tolerance:   Good; noted SOLARES and SpO2 89-91% immediately post activity on RA. Recovered to 94% at rest    After treatment patient left in no apparent distress:   Sitting in chair and Call bell within reach    COMMUNICATION/EDUCATION:   The patients plan of care was discussed with: Registered nurse. Fall prevention education was provided and the patient/caregiver indicated understanding., Patient/family have participated as able in goal setting and plan of care. , and Patient/family agree to work toward stated goals and plan of care.     Thank you for this referral.  Marion Reynoso, PT, DPT   Time Calculation: 26 mins

## 2021-12-01 NOTE — PROGRESS NOTES
OCCUPATIONAL THERAPY EVALUATION/DISCHARGE  Patient: Grady Ramos (24 y.o. male)  Date: 12/1/2021  Primary Diagnosis: Hip fracture ([address] [S72.009A]  Procedure(s) (LRB):  LEFT HIP ARTHROPLASTY TOTAL ANTERIOR APPROACH (Left) 1 Day Post-Op   Precautions:   WBAT, Total hip    ASSESSMENT  Based on the objective data described below, the patient presents with decreased balance and endurance, baseline level of assist required to complete ADLs. Pt required increased encouragement from therapist to participate in session, following encouragement/education pt agreeable to complete bedroom mobility with RW. Pt reports at baseline his wife completes ADLs, assist with bathing, and occasionally assists with toileting; wife is retired LPN. Pt with soft BP during session, improving with activity. Overall pt required CGA to complete mobility using RW, pt declined offer to use bathroom - reports at baseline he performs self-cathing. At conclusion of session pt returned to sitting in chair, cues and education provided regarding hand placement to reach for arm rests. Pt reporting no desire to continue OT services as wife completes ADLs at baseline,. Pt agreeable to have HHOT/PT at [facility] to assist with decreasing caregiver burden, maximize home safety and independence. Current Level of Function (ADLs/self-care): up to max A for LB dressing, mod A for bathing, HAMILTON/SPV for UB ADLs - pt reports this is congruent with baseline LOF    Functional Outcome Measure: The patient scored 60/100 on the Barthel index outcome measure which is indicative of minimally independent.       Other factors to consider for discharge: pt requies assist form wife at baseline, has handicap accessible home      PLAN :  Recommendation for discharge: (in order for the patient to meet his/her long term goals)  Occupational therapy at least 2 days/week in the home     This discharge recommendation:  Has been made in collaboration with the attending provider and/or case management    IF patient discharges home will need the following DME: Trisha marlow       SUBJECTIVE:   Patient stated I have everything I need at home, I dont think I need your services while I am here.     OBJECTIVE DATA SUMMARY:   HISTORY:   Past Medical History:   Diagnosis Date    Chronic systolic congestive heart failure ([address] 8/14/2017    Essential hypertension 8/14/2017    Heart block 8/14/2017    Pacemaker 8/14/2017     Past Surgical History:   Procedure Laterality Date    HX BLADDER REPAIR      HX CORONARY STENT PLACEMENT      HX PACEMAKER         Prior Level of Function/Environment/Context: Pt reports using walking stick at baseline, wife assist with LB ADLs, bathing, and Sets up pt for UB ADLs at baseline. Wife is retired RN. Pt reports being very active in his chruch and having excellent family and friend support at home. Pt reports having occasional falls during higher level ADls/IADLs. Expanded or extensive additional review of patient history:     Home Situation  Home Environment: Private residence  # Steps to Enter: 1  One/Two Story Residence: Other (Comment) (3 story, stays on 1st)  # of Interior Steps: 1  Height of Each Step (in): 6 inches  Interior Rails: None  Lift Chair Available: No  Living Alone: No  Support Systems: Spouse/Significant Other, [employer]/Kristy Community, Friend/Neighbor  Patient Expects to be Discharged to[de-identified] [employer]  Current DME Used/Available at Home: Shower chair  Tub or Shower Type: Tub/Shower combination (walk in tub/shower - handicap)    Hand dominance: Right    EXAMINATION OF PERFORMANCE DEFICITS:  Cognitive/Behavioral Status:  Neurologic State: Alert  Orientation Level: Oriented X4                Skin: intact    Edema: slight edema in LLE; pt declined ice     Hearing:   Auditory  Auditory Impairment: None  Hearing Aids/Status: Does not own    Vision/Perceptual:                                     Range of Motion:    AROM: Within functional limits Strength:    Strength: Generally decreased, functional                Coordination:  Coordination: Within functional limits            Tone & Sensation:    Tone: Normal  Sensation: Intact                      Balance:  Sitting: Intact  Standing: Intact; With support    Functional Mobility and Transfers for ADLs:  Bed Mobility:  Rolling: Supervision  Supine to Sit: Supervision  Scooting: Contact guard assistance    Transfers:  Sit to Stand: Contact guard assistance  Stand to Sit: Contact guard assistance    ADL Assessment:  Pt decline completing ADLs on this date for assessment - reports wife completes ADLs at baseline. Educated pt briefly on hemidressing techniques. ADL Intervention and task modifications:                           Lower Body Dressing Assistance  Underpants: Maximum assistance  Pants With Elastic Waist: Maximum assistance  Socks: Maximum assistance                 Functional Measure:    Barthel Index:  Bathin  Bladder: 5 (reports self cathing at baseline)  Bowels: 10  Groomin  Dressin  Feeding: 10  Mobility: 10  Stairs: 0  Toilet Use: 5  Transfer (Bed to Chair and Back): 10  Total: 60/100      The Barthel ADL Index: Guidelines  1. The index should be used as a record of what a patient does, not as a record of what a patient could do. 2. The main aim is to establish degree of independence from any help, physical or verbal, however minor and for whatever reason. 3. The need for supervision renders the patient not independent. 4. A patient's performance should be established using the best available evidence. Asking the patient, friends/relatives and nurses are the usual sources, but direct observation and common sense are also important. However direct testing is not needed. 5. Usually the patient's performance over the preceding 24-48 hours is important, but occasionally longer periods will be relevant.   6. Middle categories imply that the patient supplies over 50 per cent of the effort. 7. Use of aids to be independent is allowed. Score Interpretation (from [address])    Independent   60-79 Minimally independent   40-59 Partially dependent   20-39 Very dependent   <20 Totally dependent     -Remy Vanegas., Barthel, D.W. (1965). Functional evaluation: the Barthel Index. [address] ([address] (1997). The Barthel activities of daily living index: self-reporting versus actual performance in the old (> or = 75 years). Journal of [address] [place] [address] 45(7), [address] [facility], J.DRAKE, Daria Moeller., Romeo Frankel. (1999). Measuring the change in disability after inpatient rehabilitation; comparison of the responsiveness of the Barthel Index and Functional [place] Measure. Journal of Neurology, Neurosurgery, and Psychiatry, 66(4), 584-287. Marcos Ace, N.J.A, MAY Banegas, & Johnathan Luo M.A. (2004) Assessment of post-stroke quality of life in cost-effectiveness studies: The usefulness of the Barthel Index and the EuroQoL-5D.  Quality of Life Research, 15, 711-41       Occupational Therapy Evaluation Charge Determination   History Examination Decision-Making   LOW Complexity : Brief history review  LOW Complexity : 1-3 performance deficits relating to physical, cognitive , or psychosocial skils that result in activity limitations and / or participation restrictions  LOW Complexity : No comorbidities that affect functional and no verbal or physical assistance needed to complete eval tasks       Based on the above components, the patient evaluation is determined to be of the following complexity level: LOW   Pain Rating:  3/10 - pt declined ice or use of recliner      Activity Tolerance:   Fair      After treatment patient left in no apparent distress:    Sitting in chair and Call bell within reach    COMMUNICATION/EDUCATION:   The patients plan of care was discussed with: Occupational therapist and Registered nurse.      Thank you for this referral.  Megan Espinal, OT  Time Calculation: 24 mins

## 2021-12-01 NOTE — PROGRESS NOTES
Transition of Care Plan:  RUR: 10% low   Disposition: home with outpatient therapy (will need outpatient therapy script prior to d/c)  Follow up appointments: ortho, PCP  DME needed: RW- approved through [employer] and provided to patient at bedside   Transportation at Discharge: wife   [address] or means to access home: wife to provide        IM Medicare Letter: to be provided prior to d/c  Is patient a BCPI-A Bundle: no   If yes, was Bundle Letter given?:    Is patient a  and connected with the [place]? yes  If yes, was [facility] transfer form completed and [facility] notified? Patient and wife refused to complete transfer form, [place] has been notified and documentations sent  Caregiver Contact: wife Carol Shafer [phone]  Discharge Caregiver contacted prior to discharge? yes              Reason for Admission: left hip fracture                    RUR Score: 10%                   Plan for utilizing home health: per recommendation         PCP: First and Last name:  Dr Brodie Mckeon    [facility]    Are you a current patient: Yes/No: yes   Approximate date of last visit: 2 mo ago    Can you participate in a virtual visit with your PCP: yes                    Current Advanced Directive/Advance Care Plan: Joya 13 (ACP) Conversation      Date of Conversation: 12/1/21  Conducted with: Patient with Decision Making Capacity    Content/Action Overview:   DECLINED ACP conversation - will revisit periodically     Healthcare Decision Maker:   Click here to complete [address] including selection of the [address] Relationship (ie \"Primary\")             Primary Decision Maker: Jordin Greer - Spouse - [phone]                  Transition of Care Plan:                      CM made room visit with patient who was alert and oriented. Pt confirmed demographics, insurance, and emergency contact on file. Pt gets all medications through the [place].  Pt and wife live in a 3 story home with 0 janet. Pt reports having no medical equipment at home. However, has an appointment at the [place] next week to be fitted for motorized wheelchair and other DME. At baseline pt's wife assists with ALDs. Pt reported he is independent with driving. Pt has no hx of HH, SNF, or IPR. Per patient and wife, their plan is for patient to d/c home with outpatient therapy at the [place]. Pt and wife do not want home health services. Pt's wife is a retired nurse and knowledgeable in how to appropriately care for patient in the home. Pt requested CM to order a RW, which is being recommended by therapy. Referral for RW sent to [facility] and approved. RW has been provided to patient at bedside. CM explained transfer/refusal to transfer to [place], per protocol. Per pt and wife they do not want to transfer to the [place] as d/c is anticipated for tomorrow vs Friday. Pt and wife also declined to sign refusal of transfer forms for the [place]. Pt and wife stated they already contacted the [place] regarding pt staying at [facility] until d/c and was informed pt's hospital stay would be covered since it was an emergency. CM has faxed clinicals to the [place] per protocol, indicating on the face sheet that pt/wife refused to sign \"refusal of transfer\" form. Care Management Interventions  PCP Verified by CM: Yes (last seen 2 mo ago)  Mode of Transport at Discharge:  Other (see comment) (wife )  Transition of Care Consult (CM Consult): Discharge Planning  MyChart Signup:  (RW)  Discharge Durable Medical Equipment: Yes  Physical Therapy Consult: Yes  Occupational Therapy Consult: Yes  Speech Therapy Consult: No  Support Systems: Spouse/Significant Other  Confirm Follow Up Transport: Family  Discharge Location  Discharge Placement: Home with outpatient services    Ana Hollins, [address]

## 2021-12-02 VITALS
TEMPERATURE: 97.6 F | SYSTOLIC BLOOD PRESSURE: 122 MMHG | HEIGHT: 78 IN | DIASTOLIC BLOOD PRESSURE: 74 MMHG | RESPIRATION RATE: 18 BRPM | BODY MASS INDEX: 26.38 KG/M2 | WEIGHT: 228 LBS | OXYGEN SATURATION: 96 % | HEART RATE: 69 BPM

## 2021-12-02 LAB
ANION GAP SERPL CALC-SCNC: 7 MMOL/L (ref 5–15)
BACTERIA SPEC CULT: ABNORMAL
BASOPHILS # BLD: 0 K/UL (ref 0–0.1)
BASOPHILS NFR BLD: 0 % (ref 0–1)
BUN SERPL-MCNC: 24 MG/DL (ref 6–20)
BUN/CREAT SERPL: 26 (ref 12–20)
CALCIUM SERPL-MCNC: 7.6 MG/DL (ref 8.5–10.1)
CC UR VC: ABNORMAL
CHLORIDE SERPL-SCNC: 112 MMOL/L (ref 97–108)
CO2 SERPL-SCNC: 20 MMOL/L (ref 21–32)
CREAT SERPL-MCNC: 0.93 MG/DL (ref 0.7–1.3)
DIFFERENTIAL METHOD BLD: ABNORMAL
EOSINOPHIL # BLD: 0 K/UL (ref 0–0.4)
EOSINOPHIL NFR BLD: 0 % (ref 0–7)
ERYTHROCYTE [DISTWIDTH] IN BLOOD BY AUTOMATED COUNT: 13.8 % (ref 11.5–14.5)
GLUCOSE SERPL-MCNC: 165 MG/DL (ref 65–100)
HCT VFR BLD AUTO: 32.6 % (ref 36.6–50.3)
HGB BLD-MCNC: 10.6 G/DL (ref 12.1–17)
IMM GRANULOCYTES # BLD AUTO: 0.1 K/UL (ref 0–0.04)
IMM GRANULOCYTES NFR BLD AUTO: 1 % (ref 0–0.5)
LYMPHOCYTES # BLD: 0.7 K/UL (ref 0.8–3.5)
LYMPHOCYTES NFR BLD: 5 % (ref 12–49)
MCH RBC QN AUTO: 32.6 PG (ref 26–34)
MCHC RBC AUTO-ENTMCNC: 32.5 G/DL (ref 30–36.5)
MCV RBC AUTO: 100.3 FL (ref 80–99)
MONOCYTES # BLD: 1.5 K/UL (ref 0–1)
MONOCYTES NFR BLD: 10 % (ref 5–13)
NEUTS SEG # BLD: 12.6 K/UL (ref 1.8–8)
NEUTS SEG NFR BLD: 84 % (ref 32–75)
NRBC # BLD: 0 K/UL (ref 0–0.01)
NRBC BLD-RTO: 0 PER 100 WBC
PLATELET # BLD AUTO: 136 K/UL (ref 150–400)
PMV BLD AUTO: 11.6 FL (ref 8.9–12.9)
POTASSIUM SERPL-SCNC: 3.5 MMOL/L (ref 3.5–5.1)
RBC # BLD AUTO: 3.25 M/UL (ref 4.1–5.7)
RBC MORPH BLD: ABNORMAL
SERVICE CMNT-IMP: ABNORMAL
SODIUM SERPL-SCNC: 139 MMOL/L (ref 136–145)
WBC # BLD AUTO: 14.9 K/UL (ref 4.1–11.1)

## 2021-12-02 PROCEDURE — 74011250637 HC RX REV CODE- 250/637: Performed by: PHYSICIAN ASSISTANT

## 2021-12-02 PROCEDURE — 74011250637 HC RX REV CODE- 250/637: Performed by: ORTHOPAEDIC SURGERY

## 2021-12-02 PROCEDURE — 74011250637 HC RX REV CODE- 250/637: Performed by: INTERNAL MEDICINE

## 2021-12-02 PROCEDURE — 94760 N-INVAS EAR/PLS OXIMETRY 1: CPT

## 2021-12-02 PROCEDURE — 80048 BASIC METABOLIC PNL TOTAL CA: CPT

## 2021-12-02 PROCEDURE — 97110 THERAPEUTIC EXERCISES: CPT

## 2021-12-02 PROCEDURE — 97116 GAIT TRAINING THERAPY: CPT

## 2021-12-02 PROCEDURE — 36415 COLL VENOUS BLD VENIPUNCTURE: CPT

## 2021-12-02 PROCEDURE — 85025 COMPLETE CBC W/AUTO DIFF WBC: CPT

## 2021-12-02 PROCEDURE — 74011000258 HC RX REV CODE- 258: Performed by: GENERAL ACUTE CARE HOSPITAL

## 2021-12-02 PROCEDURE — 74011250636 HC RX REV CODE- 250/636: Performed by: GENERAL ACUTE CARE HOSPITAL

## 2021-12-02 RX ORDER — OXYCODONE HYDROCHLORIDE 5 MG/1
5 TABLET ORAL
Qty: 30 TABLET | Refills: 0 | Status: SHIPPED | OUTPATIENT
Start: 2021-12-02 | End: 2021-12-02 | Stop reason: SDUPTHER

## 2021-12-02 RX ORDER — OXYCODONE HYDROCHLORIDE 5 MG/1
5 TABLET ORAL
Qty: 30 TABLET | Refills: 0 | Status: SHIPPED | OUTPATIENT
Start: 2021-12-02 | End: 2021-12-09

## 2021-12-02 RX ORDER — ASPIRIN 81 MG/1
81 TABLET ORAL 2 TIMES DAILY
Qty: 60 TABLET | Refills: 0 | Status: SHIPPED | OUTPATIENT
Start: 2021-12-02 | End: 2022-01-01

## 2021-12-02 RX ADMIN — Medication 1 AMPULE: at 08:51

## 2021-12-02 RX ADMIN — Medication 10 ML: at 05:43

## 2021-12-02 RX ADMIN — CEFTRIAXONE 1 G: 1 INJECTION, POWDER, FOR SOLUTION INTRAMUSCULAR; INTRAVENOUS at 11:25

## 2021-12-02 RX ADMIN — POTASSIUM CHLORIDE 10 MEQ: 750 TABLET, FILM COATED, EXTENDED RELEASE ORAL at 08:53

## 2021-12-02 RX ADMIN — ACETAMINOPHEN 650 MG: 325 TABLET ORAL at 11:25

## 2021-12-02 RX ADMIN — ACETAMINOPHEN 650 MG: 325 TABLET ORAL at 06:07

## 2021-12-02 RX ADMIN — DULOXETINE HYDROCHLORIDE 30 MG: 30 CAPSULE, DELAYED RELEASE ORAL at 08:53

## 2021-12-02 RX ADMIN — POLYETHYLENE GLYCOL 3350 17 G: 17 POWDER, FOR SOLUTION ORAL at 08:52

## 2021-12-02 RX ADMIN — FAMOTIDINE 20 MG: 20 TABLET, FILM COATED ORAL at 08:53

## 2021-12-02 RX ADMIN — ASPIRIN 81 MG: 81 TABLET, COATED ORAL at 08:52

## 2021-12-02 RX ADMIN — METOPROLOL SUCCINATE 12.5 MG: 25 TABLET, EXTENDED RELEASE ORAL at 08:52

## 2021-12-02 RX ADMIN — CELECOXIB 200 MG: 100 CAPSULE ORAL at 08:53

## 2021-12-02 RX ADMIN — DOCUSATE SODIUM 50MG AND SENNOSIDES 8.6MG 1 TABLET: 8.6; 5 TABLET, FILM COATED ORAL at 08:52

## 2021-12-02 RX ADMIN — OXYCODONE 5 MG: 5 TABLET ORAL at 06:20

## 2021-12-02 RX ADMIN — SACUBITRIL AND VALSARTAN 1 TABLET: 24; 26 TABLET, FILM COATED ORAL at 08:53

## 2021-12-02 RX ADMIN — TETRAHYDROZOLINE HCL 2 DROP: 0.05 SOLUTION/ DROPS OPHTHALMIC at 08:54

## 2021-12-02 NOTE — DISCHARGE SUMMARY
Hospitalist Discharge Summary     Patient ID:  Jerel Spurling  [id number]  68 y.o.  1945 11/29/2021    PCP on record: Janak Pemberton MD    Admit date: 11/29/2021  Discharge date and time: 12/2/2021    DISCHARGE DIAGNOSIS:  See below    CONSULTATIONS:  IP CONSULT TO ORTHOPEDIC SURGERY  IP CONSULT TO HOSPITALIST    Excerpted HPI from H&P of Paras Andrews MD:  Nicola Garcia is a 68 y.o.  male with PMHx significant for  Pt was trying to get out of his truck, said he had sciatic nerve pain that caused him to missed a step when he tried to get out of the truck. Pt took some pain meds at home that night, however when he woke up in the AM, he couldn't move due to severe pain. Wife decided to call for help. On arrival, pt was found to have L hip fracture and L 5th rib fracture. He only complains of L hip pain. Denies any sob or pain at L chest.  No fever, chills, cp or sob.    ______________________________________________________________________  DISCHARGE SUMMARY/HOSPITAL COURSE:  for full details see H&P, daily progress notes, labs, consult notes. Hip fracture from GLF  S/ left Total Hip Arthroplasty, Direct Anterior Approach  DVT prophylaxis and pain management per ortho   PT/OT as per protocol    12/2:  Pt doing well after his surgery. Ambulating well. Plan will be for OP PT/OT  Pain medication and DVT prophylaxis rx to be provided by Orthopedics     Hx of chronic systolic HF, EF 17% G//M AICD  Hx of congenital heart block s/p PM.    HTN  No recent cardiac events, no hx of stent placement.  No recent admission for heart failure.  Currently euvolemic.    Cont' home medications.  Monitor fluid status. Pt follows at the [facility]    L 5th rib fracture  Nondisplaced.  Aggressive pulmonary toilet.  Pain control.    O2 suppl prn    _______________________________________________________________________  Patient seen and examined by me on discharge day.   Pertinent Findings:  Gen: Not in distress  Chest: Clear lungs  CVS:   Regular rhythm. No edema  Abd:  Soft, not distended, not tender  Neuro:  Alert, oriented x3  _______________________________________________________________________  DISCHARGE MEDICATIONS:   Current Discharge Medication List      START taking these medications    Details   aspirin delayed-release 81 mg tablet Take 1 Tablet by mouth two (2) times a day for 30 days. Qty: 60 Tablet, Refills: 0  Start date: 12/1/2021, End date: 12/31/2021      famotidine (PEPCID) 20 mg tablet Take 1 Tablet by mouth two (2) times a day for 30 days. Qty: 60 Tablet, Refills: 0  Start date: 12/1/2021, End date: 12/31/2021         CONTINUE these medications which have NOT CHANGED    Details   metoprolol succinate (Toprol XL) 25 mg XL tablet Take 25 mg by mouth daily. niacin ER (NIASPAN) 750 mg Tb24 Take 1,500 mg by mouth nightly. potassium chloride SR (KLOR-CON 10) 10 mEq tablet Take 10 mEq by mouth daily. cetirizine (ZYRTEC) 10 mg tablet Take 10 mg by mouth nightly. DULoxetine (CYMBALTA) 30 mg capsule Take 30 mg by mouth daily. calcium polycarbophiL (Fiber Laxative, ca polycarbo,) 625 mg tablet Take 1,250 mg by mouth four (4) times daily. sacubitriL-valsartan (Entresto) 24-26 mg tablet Take 1 Tablet by mouth two (2) times a day. furosemide (LASIX) 40 mg tablet Take 60 mg by mouth daily. HYDROcodone-acetaminophen (NORCO) 5-325 mg per tablet Take 1 Tab by mouth two (2) times daily as needed for Pain. Max Daily Amount: 2 Tabs. Refills: 0    Associated Diagnoses: Chronic midline low back pain, unspecified whether sciatica present      OTHER Please provide patient with catheters listed below: Catheter is: [address] [place] [address], Male, FR 14/4.7mm.    Please fax to [facility]  Qty: 120 Each, Refills: 0    Associated Diagnoses: Urinary retention         STOP taking these medications       aspirin (ASPIRIN) 325 mg tablet Comments:   Reason for Stopping: Patient Follow Up Instructions:    Activity: Activity as tolerated  Diet: Resume previous diet  Wound Care: None needed    Follow-up Information     Follow up With Specialties Details Why Contact Info    [employer]   This is the medical equipment agnecy that supplied your rolling walker  [address]  [phone]    Ever López MD Family Medicine   [address]  [address]  [phone]          ________________________________________________________________    Risk of deterioration: Low    Condition at Discharge:  Stable  __________________________________________________________________    Disposition  Home with family and home health services    ____________________________________________________________________    Code Status: Full Code  ___________________________________________________________________      Total time in minutes spent coordinating this discharge (includes going over instructions, follow-up, prescriptions, and preparing report for sign off to her PCP) :  >30 minutes    Signed:  Hermila Longoria MD

## 2021-12-02 NOTE — PROGRESS NOTES
Po xenia for fracture. Pain well managed  Tolerating diet and working with pt  hgb stable 10.6  Ready for dc home    Patient Vitals for the past 24 hrs:   Temp Pulse Resp BP SpO2   12/02/21 1057 97.6 °F (36.4 °C) 69 18 122/74 96 %   12/02/21 0719 -- -- -- -- 96 %   12/02/21 0716 97.7 °F (36.5 °C) 80 18 106/75 96 %   12/02/21 0353 97.7 °F (36.5 °C) 72 18 123/74 93 %   12/01/21 2304 98.3 °F (36.8 °C) 69 18 113/67 92 %   12/01/21 1948 97.8 °F (36.6 °C) 72 18 112/71 92 %   12/01/21 1635 97.8 °F (36.6 °C) 70 18 106/66 92 %     Dressing clean and dry  Musculoskeletal: Samantha's sign negative in bilateral lower extremities. Calves soft, supple, non-tender upon palpation or with passive stretch.      oob with pt  Dc home  [address] [facility] for dvt

## 2021-12-02 NOTE — PROGRESS NOTES
Problem: Mobility Impaired (Adult and Pediatric)  Goal: *Acute Goals and Plan of Care (Insert Text)  Description: FUNCTIONAL STATUS PRIOR TO ADMISSION: Patient was modified independent using a walking stick for functional mobility. Reports pre-op limitations from back and left hip pain along with SOLARES. HOME SUPPORT PRIOR TO ADMISSION: The patient lived with his supportive wife but did not require assist. Wife is a retired RN. Physical Therapy Goals  Initiated 12/1/2021    1. Patient will move from supine to sit and sit to supine  in bed with modified independence within 4 days. 2. Patient will perform sit to stand with modified independence within 4 days. 3. Patient will ambulate with modified independence for 75 feet with the least restrictive device within 4 days. 4. Patient will ascend/descend 2 stairs with 0 handrail(s) with modified independence within 4 days. 5. Patient will perform anterior hip home exercise program per protocol with modified independence within 4 days. Outcome: Progressing Towards Goal   PHYSICAL THERAPY TREATMENT  Patient: Angelo Fagan (29 y.o. male)  Date: 12/2/2021  Diagnosis: Hip fracture ([facility] [address] [S72.009A]   <principal problem not specified>  Procedure(s) (LRB):  LEFT HIP ARTHROPLASTY TOTAL ANTERIOR APPROACH (Left) 2 Days Post-Op  Precautions: WBAT, Total hip  Chart, physical therapy assessment, plan of care and goals were reviewed. ASSESSMENT  Patient continues with skilled PT services and is progressing towards goals. Tolerated THR exercises well. Provided sheet for reference to use at home. He was able to transfer supine to sit and sit to stand with no assistance from PT. Gait progressed to 350 feet x 2 using RW with sitting rest. Up/down 4 steps with 1 rail. Unable to practice car transfers due to simulator was unavailable. Reviewed process for car transfer and he reports being very familiar due to having his other hip done in the last couple years.  Returned to room and left up in chair with all needs met when the session ended. Cleared for dc home from PT standpoint. Current Level of Function Impacting Discharge (mobility/balance): near independent for supine to sit transfers; modified independent sit to standing; supervision ambulation with RW and 350 feet x 2    Other factors to consider for discharge: modified independent with walking stick PLOF; good home support         PLAN :  Patient continues to benefit from skilled intervention to address the above impairments. Continue treatment per established plan of care. to address goals. Recommendation for discharge: (in order for the patient to meet his/her long term goals)  PT with RW    This discharge recommendation:  Has been made in collaboration with the attending provider and/or case management    IF patient discharges home will need the following DME: rolling walker       SUBJECTIVE:   Patient stated I hope to get out of here today.     OBJECTIVE DATA SUMMARY:   Critical Behavior:  Neurologic State: Alert  Orientation Level: Oriented X4  Cognition: Appropriate decision making, Appropriate for age attention/concentration, Appropriate safety awareness, Follows commands     Functional Mobility Training:  Bed Mobility:  Rolling: Modified independent  Supine to Sit: Supervision     Scooting: Modified independent        Transfers:  Sit to Stand: Modified independent  Stand to Sit: Modified independent        Bed to Chair: Supervision;  Adaptive equipment (RW)                    Balance:  Sitting: Intact  Standing: Impaired  Standing - Static: Good; Occasional  Standing - Dynamic : Fair; Constant support  Ambulation/Gait Training:  Distance (ft): 700 Feet (ft) (350 feet x 2)  Assistive Device: Gait belt; Walker, rolling  Ambulation - Level of Assistance: Supervision        Gait Abnormalities: Decreased step clearance  Right Side Weight Bearing: Full  Left Side Weight Bearing: As tolerated  Base of Support: Widened     Speed/Shelley: Pace decreased (<100 feet/min)  Step Length: Right shortened; Left shortened        Interventions: Safety awareness training           Stairs:  Number of Stairs Trained: 4  Stairs - Level of Assistance: Stand-by assistance   Rail Use: Right     Therapeutic Exercises:   SUPINE  EXERCISES   Sets   Reps   Active Active Assist   Passive Self ROM   Comments   Ankle Pumps  10 [x]                                        []                                        []                                        []                                           Quad Sets  10 [x]                                        []                                        []                                        []                                           Heel Slides  10 [x]                                        []                                        []                                        []                                           Hip Abduction  10 [x]                                        []                                        []                                        []                                           Glut Sets  10 [x]                                        []                                        []                                        []                                              []                                        []                                        []                                        []                                              []                                        []                                        []                                        []                                             STANDING  EXERCISES   Sets   Reps   Active Active Assist   Passive Self ROM   Comments   Heel Raises   []                                        []                                        []                                        []                                           Hip Abduction   []                                        []                                        []                                        []                                              []                                        []                                        []                                        []                                              []                                        []                                        []                                        []                                             Pain Rating:  No complaints during session    Activity Tolerance:   Good and SpO2 stable on RA      After treatment patient left in no apparent distress:   Sitting in chair, Call bell within reach, and Caregiver / family present    COMMUNICATION/COLLABORATION:   The patients plan of care was discussed with: Occupational therapist, Registered nurse, and NP .      Nickie Coe, PT   Time Calculation: 23 mins

## 2021-12-02 NOTE — PROGRESS NOTES
Problem: Falls - Risk of  Goal: *Absence of Falls  Description: Document Hilmar Fall Risk and appropriate interventions in the flowsheet. Outcome: Progressing Towards Goal  Note: Fall Risk Interventions:  Mobility Interventions: Assess mobility with egress test, Communicate number of staff needed for ambulation/transfer, OT consult for ADLs, Patient to call before getting OOB, PT Consult for mobility concerns, PT Consult for assist device competence, Utilize walker, cane, or other assistive device, Utilize gait belt for transfers/ambulation, Strengthening exercises (ROM-active/passive)         Medication Interventions: Assess postural VS orthostatic hypotension, Evaluate medications/consider consulting pharmacy, Patient to call before getting OOB, Utilize gait belt for transfers/ambulation, Teach patient to arise slowly    Elimination Interventions: Call light in reach, Elevated toilet seat, Patient to call for help with toileting needs, Toileting schedule/hourly rounds, Toilet paper/wipes in reach, Stay With Me (per policy), Urinal in reach    History of Falls Interventions: Consult care management for discharge planning, Door open when patient unattended, Evaluate medications/consider consulting pharmacy, Room close to nurse's station, Utilize gait belt for transfer/ambulation, Assess for delayed presentation/identification of injury for 48 hrs (comment for end date), Vital signs minimum Q4HRs X 24 hrs (comment for end date)         Problem: Patient Education: Go to Patient Education Activity  Goal: Patient/Family Education  Outcome: Progressing Towards Goal     Problem: Pressure Injury - Risk of  Goal: *Prevention of pressure injury  Description: Document Dario Scale and appropriate interventions in the flowsheet.   Outcome: Progressing Towards Goal  Note: Pressure Injury Interventions:  Sensory Interventions: Assess changes in LOC, Assess need for specialty bed, Avoid rigorous massage over bony prominences, Chair cushion, Check visual cues for pain, Float heels, Discuss PT/OT consult with provider, Keep linens dry and wrinkle-free, Maintain/enhance activity level, Minimize linen layers, Monitor skin under medical devices, Pad between skin to skin    Moisture Interventions: Absorbent underpads, Apply protective barrier, creams and emollients, Assess need for specialty bed, Check for incontinence Q2 hours and as needed, Contain wound drainage, Limit adult briefs, Maintain skin hydration (lotion/cream), Minimize layers, Offer toileting Q_hr, Moisture barrier    Activity Interventions: Assess need for specialty bed, Chair cushion, Increase time out of bed, Pressure redistribution bed/mattress(bed type), PT/OT evaluation    Mobility Interventions: Assess need for specialty bed, Chair cushion, Float heels, Pressure redistribution bed/mattress (bed type), PT/OT evaluation, HOB 30 degrees or less    Nutrition Interventions: Document food/fluid/supplement intake, Discuss nutritional consult with provider, Offer support with meals,snacks and hydration    Friction and Shear Interventions: Apply protective barrier, creams and emollients, Feet elevated on foot rest, Foam dressings/transparent film/skin sealants, HOB 30 degrees or less, Lift sheet, Minimize layers, Lift team/patient mobility team                Problem: Patient Education: Go to Patient Education Activity  Goal: Patient/Family Education  Outcome: Progressing Towards Goal     Problem: Hip Replacement: Post Op Day 1  Goal: Off Pathway (Use only if patient is Off Pathway)  Outcome: Progressing Towards Goal  Goal: Activity/Safety  Outcome: Progressing Towards Goal  Goal: Diagnostic Test/Procedures  Outcome: Progressing Towards Goal  Goal: Nutrition/Diet  Outcome: Progressing Towards Goal  Goal: Medications  Outcome: Progressing Towards Goal  Goal: Respiratory  Outcome: Progressing Towards Goal  Goal: Treatments/Interventions/Procedures  Outcome: Progressing Towards Goal  Goal: Psychosocial  Outcome: Progressing Towards Goal  Goal: Discharge Planning  Outcome: Progressing Towards Goal  Goal: *Demonstrates progressive activity  Outcome: Progressing Towards Goal  Goal: *Optimal pain control at patient's stated goal  Outcome: Progressing Towards Goal  Goal: *Hemodynamically stable  Outcome: Progressing Towards Goal  Goal: *Discharge plan identified  Outcome: Progressing Towards Goal

## 2021-12-02 NOTE — PROGRESS NOTES
End of Shift Note    Bedside shift change report given to Martínez Hoffman RN (oncoming nurse) by Aaliyah Jean LPN (offgoing nurse). Report included the following information SBAR, Kardex, Procedure Summary, Intake/Output, MAR and Recent Results    Shift worked:  7p-7:30a     Shift summary and any significant changes:          Concerns for physician to address:       Zone phone for oncoming shift:   [phone]       Activity:  Activity Level: Up with Assistance  Number times ambulated in hallways past shift: 0  Number of times OOB to chair past shift: 0    Cardiac:   Cardiac Monitoring: Yes      Cardiac Rhythm: AV Paced    Access:   Current line(s): PIV     Genitourinary:   Urinary status: self cath    Respiratory:   O2 Device: None (Room air)  Chronic home O2 use?: NO  Incentive spirometer at bedside: YES     GI:  Last Bowel Movement Date: 11/28/21  Current diet:  ADULT DIET Regular  Passing flatus: YES  Tolerating current diet: YES       Pain Management:   Patient states pain is manageable on current regimen: YES    Skin:  Dario Score: 19  Interventions: float heels, increase time out of bed and nutritional support     Patient Safety:  Fall Score:  Total Score: 4  Interventions: assistive device (walker, cane, etc), gripper socks, pt to call before getting OOB and stay with me (per policy)  High Fall Risk: Yes    Length of Stay:  Expected LOS: 6d 4h  Actual LOS: 2      Aaliyah Jean LPN

## 2021-12-10 ENCOUNTER — APPOINTMENT (OUTPATIENT)
Dept: GENERAL RADIOLOGY | Age: 76
End: 2021-12-10
Attending: EMERGENCY MEDICINE
Payer: MEDICARE

## 2021-12-10 ENCOUNTER — HOSPITAL ENCOUNTER (EMERGENCY)
Age: 76
Discharge: HOME | End: 2021-12-10
Attending: EMERGENCY MEDICINE
Payer: MEDICARE

## 2021-12-10 VITALS
TEMPERATURE: 100 F | DIASTOLIC BLOOD PRESSURE: 76 MMHG | OXYGEN SATURATION: 94 % | HEART RATE: 91 BPM | BODY MASS INDEX: 27.91 KG/M2 | HEIGHT: 78 IN | RESPIRATION RATE: 22 BRPM | WEIGHT: 241.18 LBS | SYSTOLIC BLOOD PRESSURE: 95 MMHG

## 2021-12-10 DIAGNOSIS — S73.035A ANTERIOR DISLOCATION OF LEFT HIP, INITIAL ENCOUNTER (HCC): Primary | ICD-10-CM

## 2021-12-10 PROCEDURE — 73502 X-RAY EXAM HIP UNI 2-3 VIEWS: CPT

## 2021-12-10 PROCEDURE — 74011000250 HC RX REV CODE- 250: Performed by: EMERGENCY MEDICINE

## 2021-12-10 PROCEDURE — 75810000303 HC CLSD TRMT  FRACTURE/DISLOCATION W/  ANES

## 2021-12-10 PROCEDURE — 99152 MOD SED SAME PHYS/QHP 5/>YRS: CPT

## 2021-12-10 PROCEDURE — 99285 EMERGENCY DEPT VISIT HI MDM: CPT

## 2021-12-10 PROCEDURE — 77010033678 HC OXYGEN DAILY

## 2021-12-10 RX ORDER — KETAMINE HYDROCHLORIDE 50 MG/ML
2 INJECTION, SOLUTION INTRAMUSCULAR; INTRAVENOUS
Status: COMPLETED | OUTPATIENT
Start: 2021-12-10 | End: 2021-12-10

## 2021-12-10 RX ADMIN — KETAMINE HYDROCHLORIDE 219 MG: 50 INJECTION, SOLUTION INTRAMUSCULAR; INTRAVENOUS at 03:42

## 2021-12-10 NOTE — ED PROVIDER NOTES
EMERGENCY DEPARTMENT HISTORY AND PHYSICAL EXAM     ------------------------------------------------------------------------------------------------------  Please note that this dictation was completed with [employer], the [employer] voice recognition software. Quite often unanticipated grammatical, syntax, homophones, and other interpretive errors are inadvertently transcribed by the computer software. Please disregard these errors. Please excuse any errors that have escaped final proofreading.  -----------------------------------------------------------------------------------------------------------------    Date: 12/10/2021  Patient Name: Julito Pena    History of Presenting Illness     Chief Complaint   Patient presents with    Hip Pain       History Provided By: Patient    HPI: Julito Pena is a 68 y.o. male, with significant pmhx of  chronic systolic heart failure, hypertension, pacemaker, recent left proximal femur fracture repair entheses total hip arthroplasty) on 11/30 with Dr. Maeve Yun, who presents via EMS to the ED with c/o left hip pain while transitioning on the commode felt as though his hip dislocated. Noted to have intact sensation and motor function distally with 1+ pulse. Received 200 mcg of fentanyl with EMS for transport. Pt also specifically denies any recent fevers, chills, CP, SOB, nausea, vomiting, diarrhea, abd pain, changes in BM, urinary sxs, or headache. PCP: Chicho Meza MD    Social Hx: denies tobacco, denies EtOH, denies recreational/ Illicit Drugs     There are no other complaints, changes, or physical findings at this time.      Allergies   Allergen Reactions    Celery Other (comments)     Mouth burns    Erythromycin Other (comments)     cramping    Green Pepper Nausea and Vomiting     Flu like S/S    Statins-Hmg-Coa Reductase Inhibitors Myalgia         Current Outpatient Medications   Medication Sig Dispense Refill    aspirin delayed-release 81 mg tablet Take 1 Tablet by mouth two (2) times a day for 30 days. 60 Tablet 0    famotidine (PEPCID) 20 mg tablet Take 1 Tablet by mouth two (2) times a day for 30 days. 60 Tablet 0    metoprolol succinate (Toprol XL) 25 mg XL tablet Take 25 mg by mouth daily.  niacin ER (NIASPAN) 750 mg Tb24 Take 1,500 mg by mouth nightly.  potassium chloride SR (KLOR-CON 10) 10 mEq tablet Take 10 mEq by mouth daily.  cetirizine (ZYRTEC) 10 mg tablet Take 10 mg by mouth nightly.  DULoxetine (CYMBALTA) 30 mg capsule Take 30 mg by mouth daily.  calcium polycarbophiL (Fiber Laxative, ca polycarbo,) 625 mg tablet Take 1,250 mg by mouth four (4) times daily.  sacubitriL-valsartan (Entresto) 24-26 mg tablet Take 1 Tablet by mouth two (2) times a day.  furosemide (LASIX) 40 mg tablet Take 60 mg by mouth daily.  OTHER Please provide patient with catheters listed below: Catheter is: [address] [place] [address], Male, FR 14/4.7mm. Please fax to [employer] [facility] Each 0       Past History     Past Medical History:  Past Medical History:   Diagnosis Date    Chronic systolic congestive heart failure ([address] 8/14/2017    Essential hypertension 8/14/2017    Heart block 8/14/2017    Pacemaker 8/14/2017       Past Surgical History:  Past Surgical History:   Procedure Laterality Date    HX BLADDER REPAIR      HX CORONARY STENT PLACEMENT      HX PACEMAKER         Family History:  No family history on file. Social History:  Social History     Tobacco Use    Smoking status: Never Smoker    Smokeless tobacco: Not on file   Substance Use Topics    Alcohol use: Never    Drug use: Never       Allergies:   Allergies   Allergen Reactions    Celery Other (comments)     Mouth burns    Erythromycin Other (comments)     cramping    Green Pepper Nausea and Vomiting     Flu like S/S    Statins-Hmg-Coa Reductase Inhibitors Myalgia         Review of Systems   Review of Systems   Constitutional: Negative for chills and fever.   HENT: Negative. Eyes: Negative. Respiratory: Negative for cough, chest tightness and shortness of breath. Cardiovascular: Negative for chest pain and leg swelling. Gastrointestinal: Negative for abdominal pain, diarrhea, nausea and vomiting. Endocrine: Negative. Genitourinary: Negative for difficulty urinating and dysuria. Musculoskeletal: Positive for arthralgias (L hip pain). Negative for myalgias. Skin: Negative. Neurological: Negative. Psychiatric/Behavioral: Negative. All other systems reviewed and are negative. Physical Exam   Physical Exam  Vitals and nursing note reviewed. Constitutional:       General: He is not in acute distress. Appearance: He is well-developed. He is not diaphoretic. HENT:      Head: Normocephalic and atraumatic. Nose: Nose normal.      Mouth/Throat:      Pharynx: No oropharyngeal exudate. Eyes:      Conjunctiva/sclera: Conjunctivae normal.      Pupils: Pupils are equal, round, and reactive to light. Neck:      Vascular: No JVD. Cardiovascular:      Rate and Rhythm: Regular rhythm. Tachycardia present. Pulses:           Dorsalis pedis pulses are 1+ on the left side. Heart sounds: Normal heart sounds. No murmur heard. No friction rub. Pulmonary:      Effort: Pulmonary effort is normal. No respiratory distress. Breath sounds: Normal breath sounds. No stridor. No wheezing or rales. Abdominal:      General: Bowel sounds are normal. There is no distension. Palpations: Abdomen is soft. Tenderness: There is no abdominal tenderness. There is no rebound. Musculoskeletal:         General: Tenderness present. Normal range of motion. Cervical back: Normal range of motion and neck supple. Left hip: Tenderness present. Legs:       Comments: externally rotated and mildly shortened, 2+   Skin:     General: Skin is warm and dry. Findings: No rash.    Neurological:      Mental Status: He is alert and oriented to person, place, and time. Cranial Nerves: No cranial nerve deficit. Psychiatric:         Speech: Speech normal.         Behavior: Behavior normal.         Thought Content: Thought content normal.         Judgment: Judgment normal.           Diagnostic Study Results     Labs -   No results found for this or any previous visit (from the past 12 hour(s)). Radiologic Studies -   XR HIP LT W OR WO PELV 2-3 VWS   Final Result   Dislocated left hip replacement. XR HIP LT W OR WO PELV 2-3 VWS    (Results Pending)     CT Results  (Last 48 hours)    None        CXR Results  (Last 48 hours)    None            Medical Decision Making   I am the first provider for this patient. I reviewed the vital signs, available nursing notes, past medical history, past surgical history, family history and social history. Vital Signs-Reviewed the patient's vital signs. Patient Vitals for the past 12 hrs:   Temp Pulse Resp BP SpO2   12/10/21 0504 -- (!) 101 28 -- 92 %   12/10/21 0501 -- (!) 104 18 96/63 (!) 89 %   12/10/21 0420 -- 94 21 (!) 78/51 98 %   12/10/21 0415 100 °F (37.8 °C) 90 16 (!) 104/54 97 %   12/10/21 0401 -- 95 22 (!) 101/56 93 %   12/10/21 0357 -- 89 21 (!) 81/59 97 %   12/10/21 0352 -- 83 19 (!) 84/63 (!) 74 %   12/10/21 0347 -- 99 13 124/63 100 %   12/10/21 0342 -- 100 24 124/63 99 %   12/10/21 0342 -- 100 14 109/82 99 %   12/10/21 0108 98.4 °F (36.9 °C) (!) 101 20 118/70 95 %       Pulse Oximetry Analysis - 95% on RA Normal    Records Reviewed/Interpretted: Nursing Notes from triage and Old Medical Records, noting recent admission for proximal hip fracture with repair    Provider Notes (Medical Decision Making):     DDX:  Dislocation, fracture, postoperative pain    Plan:  X-ray, analgesic    Impression:  Left hip dislocation    ED Course:   Initial assessment performed.  The patients presenting problems have been discussed, and they are in agreement with the care plan formulated and outlined with them. I have encouraged them to ask questions as they arise throughout their visit. I reviewed our electronic medical record system for any past medical records that were available that may contribute to the patients current condition, the nursing notes and and vital signs from today's visit  Nursing notes will be reviewed as they become available in realtime while the pt has been in the ED. Rudy Harper MD      I personally reviewed/interpreted pt's imaging. Agree with official read by radiology as noted above. Rudy Harper MD    CONSULT NOTE:   2:39 Yasmin Stewart MD spoke with  PA/NP Varghese Boston,   Specialty: Orthopedics  Consulted Orthopedic team by Magen Cloud due to left hip dislocation 10 days postop. Discussed pt's HPI and available diagnostic results thus far. Consultant recommends closed reduction, offers assistance if needed. Rudy Harper MD        PROGRESS NOTE  2:39 AM  Informed of left hip dislocation. Discussed plan for procedural sedation with reduction. Have consulted with orthopedics who notes patient can be safely reduced without orthopedic assistance. Procedure Note - Reduction:    Performed by Rudy Harper MD .      Immediately prior to the procedure, the patient was reevaluated and found suitable for the planned procedure and any planned medications. Immediately prior to the procedure a time out was called to verify the correct patient, procedure, equipment, staff, and marking as appropriate. Prior to the procedure, neurovascular exam was intact. Analgesia was obtained with procedural sedation - see record. To achieve reduction of the patient's left hip joint, logitudinal traction and flexion manipulation was utilized. The joint was successfully reduced. Neurovascular exam was intact following the procedure. The procedure was tolerated well.     Procedure Note - Conscious Sedation:   Start time: 8799  Procedure performed by: Amina Shipman. Asif Kaur MD  Procedural sedation has been advised for this patient associated with reduction of L hip. The risks, benefits, and alternatives have been explained to the patient and He consents to the sedation. Immediately prior to the procedure, the patient was re-evaluated and found suitable for the planned procedure and any planned medications. The ASA score is ASA 1 - Normal, healthy patient. The patient is having all vital signs monitored by an attending RN as well as pulse oximetry. Mallampati Score Reference: The patient has a patent airway with a Mallampati Classification Score of I (soft palate, uvula, fauces, tonsillar pillars visible). The patient was sedated with a total of 2mg/kgketamine/KETOLAR. Reversal agents and resuscitation equipment were at the bedside. The reduction was done and the patient tolerated the procedure well with no complications. Reversal agents were not used. The patient has been observed in the ED until He returns to baseline and is able to tolerate clear liquids. Complications: none  End time: 0423  The procedure took 1-15 minutes, and pt tolerated well. Ondina Mcgill MD        5:02 AM   Progress note:  Pt noted to be feeling better, ready for discharge. Discussed imaging findings with pt, specifically noting anterior hip dislocation with subsequent successful reduction. Pt will follow up with orthopedics as instructed. All questions have been answered, pt voiced understanding and agreement with plan. Specific return precautions provided in addition to instructions for pt to return to the ED immediately should sx worsen at any time. Ondina Mcgill MD             Critical Care Time:     none      Diagnosis     Clinical Impression:   1. Anterior dislocation of left hip, initial encounter ([facility] [address]        PLAN:  1. Current Discharge Medication List        2.    Follow-up Information     Follow up With Specialties Details Why Contact Sadaf Campos MD Internal Medicine Schedule an appointment as soon as possible for a visit in 2 days  [address] [facility] [address] [facility]      Ko Varghese MD Orthopedic Surgery Schedule an appointment as soon as possible for a visit in 2 days  Iman Miranda  [phone]      [facility] EMERGENCY DEPT Emergency Medicine  As needed [address] [facility]  [address] [facility]  [phone]        Return to ED if worse     Disposition:    5:02 AM    The patient's results have been reviewed with family and/or caregiver. They verbally convey their understanding and agreement of the patient's signs, symptoms, diagnosis, treatment and prognosis and additionally agree to follow up as recommended in the discharge instructions or to return to the Emergency Room should the patient's condition change prior to their follow-up appointment. The family and/or caregiver verbally agrees with the care-plan and all of their questions have been answered. The discharge instructions have also been provided to the them with educational information regarding the patient's diagnosis as well a list of reasons why the patient would want to return to the ER prior to their follow-up appointment should their condition change.   Shaneka Davis MD

## 2021-12-10 NOTE — ED TRIAGE NOTES
Patient presents to the ED via EMS after a GLF around 2000. Patient did not call EMS until 2300. EMS reports the patient has left hip repair 9 days ago. Patient fell from the [facility] in his home to the floor and heard a \"pop\". Patient's leg is swollen with limited ROM. Patient's surgical dressing remains intact. Patient placed on the cardiac monitor x3 and provided with his call bell.

## 2022-03-18 PROBLEM — I10 ESSENTIAL HYPERTENSION: Status: ACTIVE | Noted: 2017-08-14

## 2022-03-18 PROBLEM — S72.009A HIP FRACTURE (HCC): Status: ACTIVE | Noted: 2021-11-29

## 2022-03-19 PROBLEM — I50.22 CHRONIC SYSTOLIC CONGESTIVE HEART FAILURE (HCC): Status: ACTIVE | Noted: 2017-08-14

## 2022-03-19 PROBLEM — Z95.0 PACEMAKER: Status: ACTIVE | Noted: 2017-08-14

## 2022-03-19 PROBLEM — I45.9 HEART BLOCK: Status: ACTIVE | Noted: 2017-08-14

## 2022-11-09 ENCOUNTER — APPOINTMENT (OUTPATIENT)
Dept: CT IMAGING | Age: 77
DRG: 521 | End: 2022-11-09
Attending: PHYSICIAN ASSISTANT
Payer: MEDICARE

## 2022-11-09 ENCOUNTER — APPOINTMENT (OUTPATIENT)
Dept: GENERAL RADIOLOGY | Age: 77
DRG: 521 | End: 2022-11-09
Attending: STUDENT IN AN ORGANIZED HEALTH CARE EDUCATION/TRAINING PROGRAM
Payer: MEDICARE

## 2022-11-09 ENCOUNTER — APPOINTMENT (OUTPATIENT)
Dept: CT IMAGING | Age: 77
DRG: 521 | End: 2022-11-09
Attending: STUDENT IN AN ORGANIZED HEALTH CARE EDUCATION/TRAINING PROGRAM
Payer: MEDICARE

## 2022-11-09 ENCOUNTER — HOSPITAL ENCOUNTER (INPATIENT)
Age: 77
LOS: 8 days | Discharge: HOME HEALTH | DRG: 521 | End: 2022-11-17
Attending: STUDENT IN AN ORGANIZED HEALTH CARE EDUCATION/TRAINING PROGRAM | Admitting: INTERNAL MEDICINE
Payer: MEDICARE

## 2022-11-09 DIAGNOSIS — S72.001A CLOSED FRACTURE OF NECK OF RIGHT FEMUR, INITIAL ENCOUNTER (HCC): Primary | ICD-10-CM

## 2022-11-09 DIAGNOSIS — R07.9 CHEST PAIN: ICD-10-CM

## 2022-11-09 DIAGNOSIS — I82.409 ACUTE DEEP VEIN THROMBOSIS (DVT) OF OTHER VEIN OF LOWER EXTREMITY, UNSPECIFIED LATERALITY (HCC): ICD-10-CM

## 2022-11-09 DIAGNOSIS — R73.9 HIGH BLOOD SUGAR: ICD-10-CM

## 2022-11-09 PROBLEM — M25.551 RIGHT HIP PAIN: Status: ACTIVE | Noted: 2022-11-09

## 2022-11-09 LAB
ALBUMIN SERPL-MCNC: 3.2 G/DL (ref 3.5–5)
ALBUMIN/GLOB SERPL: 0.8 {RATIO} (ref 1.1–2.2)
ALP SERPL-CCNC: 114 U/L (ref 45–117)
ALT SERPL-CCNC: 24 U/L (ref 12–78)
ANION GAP SERPL CALC-SCNC: 5 MMOL/L (ref 5–15)
AST SERPL-CCNC: 20 U/L (ref 15–37)
BASOPHILS # BLD: 0 K/UL (ref 0–0.1)
BASOPHILS NFR BLD: 0 % (ref 0–1)
BILIRUB SERPL-MCNC: 0.7 MG/DL (ref 0.2–1)
BUN SERPL-MCNC: 13 MG/DL (ref 6–20)
BUN/CREAT SERPL: 15 (ref 12–20)
CALCIUM SERPL-MCNC: 9.7 MG/DL (ref 8.5–10.1)
CHLORIDE SERPL-SCNC: 104 MMOL/L (ref 97–108)
CO2 SERPL-SCNC: 31 MMOL/L (ref 21–32)
CREAT SERPL-MCNC: 0.88 MG/DL (ref 0.7–1.3)
DIFFERENTIAL METHOD BLD: ABNORMAL
EOSINOPHIL # BLD: 0.2 K/UL (ref 0–0.4)
EOSINOPHIL NFR BLD: 2 % (ref 0–7)
ERYTHROCYTE [DISTWIDTH] IN BLOOD BY AUTOMATED COUNT: 13.2 % (ref 11.5–14.5)
GLOBULIN SER CALC-MCNC: 4.1 G/DL (ref 2–4)
GLUCOSE SERPL-MCNC: 130 MG/DL (ref 65–100)
HCT VFR BLD AUTO: 43 % (ref 36.6–50.3)
HGB BLD-MCNC: 14.3 G/DL (ref 12.1–17)
IMM GRANULOCYTES # BLD AUTO: 0 K/UL (ref 0–0.04)
IMM GRANULOCYTES NFR BLD AUTO: 0 % (ref 0–0.5)
INR PPP: 1 (ref 0.9–1.1)
LYMPHOCYTES # BLD: 1.4 K/UL (ref 0.8–3.5)
LYMPHOCYTES NFR BLD: 13 % (ref 12–49)
MCH RBC QN AUTO: 33.1 PG (ref 26–34)
MCHC RBC AUTO-ENTMCNC: 33.3 G/DL (ref 30–36.5)
MCV RBC AUTO: 99.5 FL (ref 80–99)
MONOCYTES # BLD: 1 K/UL (ref 0–1)
MONOCYTES NFR BLD: 9 % (ref 5–13)
NEUTS SEG # BLD: 7.8 K/UL (ref 1.8–8)
NEUTS SEG NFR BLD: 76 % (ref 32–75)
NRBC # BLD: 0 K/UL (ref 0–0.01)
NRBC BLD-RTO: 0 PER 100 WBC
PLATELET # BLD AUTO: 210 K/UL (ref 150–400)
PMV BLD AUTO: 10.6 FL (ref 8.9–12.9)
POTASSIUM SERPL-SCNC: 3.3 MMOL/L (ref 3.5–5.1)
PROT SERPL-MCNC: 7.3 G/DL (ref 6.4–8.2)
PROTHROMBIN TIME: 10.3 SEC (ref 9–11.1)
RBC # BLD AUTO: 4.32 M/UL (ref 4.1–5.7)
SODIUM SERPL-SCNC: 140 MMOL/L (ref 136–145)
WBC # BLD AUTO: 10.4 K/UL (ref 4.1–11.1)

## 2022-11-09 PROCEDURE — 74011250637 HC RX REV CODE- 250/637: Performed by: PHYSICIAN ASSISTANT

## 2022-11-09 PROCEDURE — 70450 CT HEAD/BRAIN W/O DYE: CPT

## 2022-11-09 PROCEDURE — 85610 PROTHROMBIN TIME: CPT

## 2022-11-09 PROCEDURE — 99285 EMERGENCY DEPT VISIT HI MDM: CPT

## 2022-11-09 PROCEDURE — 85025 COMPLETE CBC W/AUTO DIFF WBC: CPT

## 2022-11-09 PROCEDURE — 74011250637 HC RX REV CODE- 250/637: Performed by: STUDENT IN AN ORGANIZED HEALTH CARE EDUCATION/TRAINING PROGRAM

## 2022-11-09 PROCEDURE — 74011250636 HC RX REV CODE- 250/636: Performed by: INTERNAL MEDICINE

## 2022-11-09 PROCEDURE — 80053 COMPREHEN METABOLIC PANEL: CPT

## 2022-11-09 PROCEDURE — 96374 THER/PROPH/DIAG INJ IV PUSH: CPT

## 2022-11-09 PROCEDURE — 36415 COLL VENOUS BLD VENIPUNCTURE: CPT

## 2022-11-09 PROCEDURE — 71045 X-RAY EXAM CHEST 1 VIEW: CPT

## 2022-11-09 PROCEDURE — 74011250637 HC RX REV CODE- 250/637: Performed by: INTERNAL MEDICINE

## 2022-11-09 PROCEDURE — 73502 X-RAY EXAM HIP UNI 2-3 VIEWS: CPT

## 2022-11-09 PROCEDURE — 74011250636 HC RX REV CODE- 250/636: Performed by: PHYSICIAN ASSISTANT

## 2022-11-09 PROCEDURE — 72192 CT PELVIS W/O DYE: CPT

## 2022-11-09 PROCEDURE — 74011000250 HC RX REV CODE- 250: Performed by: INTERNAL MEDICINE

## 2022-11-09 PROCEDURE — 65270000046 HC RM TELEMETRY

## 2022-11-09 RX ORDER — SODIUM CHLORIDE 0.9 % (FLUSH) 0.9 %
5-40 SYRINGE (ML) INJECTION AS NEEDED
Status: DISCONTINUED | OUTPATIENT
Start: 2022-11-09 | End: 2022-11-17 | Stop reason: HOSPADM

## 2022-11-09 RX ORDER — SODIUM CHLORIDE 0.9 % (FLUSH) 0.9 %
5-40 SYRINGE (ML) INJECTION EVERY 8 HOURS
Status: DISCONTINUED | OUTPATIENT
Start: 2022-11-09 | End: 2022-11-17 | Stop reason: HOSPADM

## 2022-11-09 RX ORDER — POTASSIUM CHLORIDE 750 MG/1
10 TABLET, FILM COATED, EXTENDED RELEASE ORAL DAILY
Status: DISCONTINUED | OUTPATIENT
Start: 2022-11-10 | End: 2022-11-17 | Stop reason: HOSPADM

## 2022-11-09 RX ORDER — ONDANSETRON 2 MG/ML
4 INJECTION INTRAMUSCULAR; INTRAVENOUS
Status: DISCONTINUED | OUTPATIENT
Start: 2022-11-09 | End: 2022-11-17 | Stop reason: HOSPADM

## 2022-11-09 RX ORDER — AMOXICILLIN 250 MG
2 CAPSULE ORAL 2 TIMES DAILY
Status: DISCONTINUED | OUTPATIENT
Start: 2022-11-09 | End: 2022-11-17 | Stop reason: HOSPADM

## 2022-11-09 RX ORDER — ACETAMINOPHEN 650 MG/1
650 SUPPOSITORY RECTAL
Status: DISCONTINUED | OUTPATIENT
Start: 2022-11-09 | End: 2022-11-13

## 2022-11-09 RX ORDER — NALOXONE HYDROCHLORIDE 0.4 MG/ML
0.4 INJECTION, SOLUTION INTRAMUSCULAR; INTRAVENOUS; SUBCUTANEOUS AS NEEDED
Status: DISCONTINUED | OUTPATIENT
Start: 2022-11-09 | End: 2022-11-17 | Stop reason: HOSPADM

## 2022-11-09 RX ORDER — POTASSIUM CHLORIDE 750 MG/1
40 TABLET, FILM COATED, EXTENDED RELEASE ORAL
Status: COMPLETED | OUTPATIENT
Start: 2022-11-09 | End: 2022-11-09

## 2022-11-09 RX ORDER — OXYCODONE HYDROCHLORIDE 5 MG/1
5 TABLET ORAL
Status: DISCONTINUED | OUTPATIENT
Start: 2022-11-09 | End: 2022-11-17 | Stop reason: HOSPADM

## 2022-11-09 RX ORDER — ONDANSETRON 4 MG/1
4 TABLET, ORALLY DISINTEGRATING ORAL
Status: DISCONTINUED | OUTPATIENT
Start: 2022-11-09 | End: 2022-11-17 | Stop reason: HOSPADM

## 2022-11-09 RX ORDER — MORPHINE SULFATE 2 MG/ML
2 INJECTION, SOLUTION INTRAMUSCULAR; INTRAVENOUS
Status: DISCONTINUED | OUTPATIENT
Start: 2022-11-10 | End: 2022-11-17 | Stop reason: HOSPADM

## 2022-11-09 RX ORDER — ACETAMINOPHEN 325 MG/1
650 TABLET ORAL
Status: DISCONTINUED | OUTPATIENT
Start: 2022-11-09 | End: 2022-11-13

## 2022-11-09 RX ORDER — DULOXETIN HYDROCHLORIDE 30 MG/1
30 CAPSULE, DELAYED RELEASE ORAL DAILY
Status: DISCONTINUED | OUTPATIENT
Start: 2022-11-10 | End: 2022-11-17 | Stop reason: HOSPADM

## 2022-11-09 RX ORDER — NIACIN 500 MG/1
1500 TABLET, EXTENDED RELEASE ORAL
Status: DISCONTINUED | OUTPATIENT
Start: 2022-11-09 | End: 2022-11-17 | Stop reason: HOSPADM

## 2022-11-09 RX ORDER — OXYCODONE AND ACETAMINOPHEN 5; 325 MG/1; MG/1
1 TABLET ORAL ONCE
Status: COMPLETED | OUTPATIENT
Start: 2022-11-09 | End: 2022-11-09

## 2022-11-09 RX ORDER — METOPROLOL SUCCINATE 25 MG/1
25 TABLET, EXTENDED RELEASE ORAL DAILY
Status: DISCONTINUED | OUTPATIENT
Start: 2022-11-10 | End: 2022-11-17 | Stop reason: HOSPADM

## 2022-11-09 RX ORDER — POLYETHYLENE GLYCOL 3350 17 G/17G
17 POWDER, FOR SOLUTION ORAL DAILY PRN
Status: DISCONTINUED | OUTPATIENT
Start: 2022-11-09 | End: 2022-11-17 | Stop reason: HOSPADM

## 2022-11-09 RX ORDER — MORPHINE SULFATE 2 MG/ML
2 INJECTION, SOLUTION INTRAMUSCULAR; INTRAVENOUS
Status: DISPENSED | OUTPATIENT
Start: 2022-11-09 | End: 2022-11-10

## 2022-11-09 RX ADMIN — SENNOSIDES AND DOCUSATE SODIUM 2 TABLET: 50; 8.6 TABLET ORAL at 21:27

## 2022-11-09 RX ADMIN — SACUBITRIL AND VALSARTAN 1 TABLET: 24; 26 TABLET, FILM COATED ORAL at 21:25

## 2022-11-09 RX ADMIN — SODIUM CHLORIDE, PRESERVATIVE FREE 10 ML: 5 INJECTION INTRAVENOUS at 21:28

## 2022-11-09 RX ADMIN — POTASSIUM CHLORIDE 40 MEQ: 750 TABLET, FILM COATED, EXTENDED RELEASE ORAL at 19:26

## 2022-11-09 RX ADMIN — OXYCODONE 5 MG: 5 TABLET ORAL at 21:25

## 2022-11-09 RX ADMIN — MORPHINE SULFATE 2 MG: 2 INJECTION, SOLUTION INTRAMUSCULAR; INTRAVENOUS at 17:03

## 2022-11-09 RX ADMIN — OXYCODONE AND ACETAMINOPHEN 1 TABLET: 5; 325 TABLET ORAL at 16:27

## 2022-11-09 RX ADMIN — MORPHINE SULFATE 2 MG: 2 INJECTION, SOLUTION INTRAMUSCULAR; INTRAVENOUS at 19:27

## 2022-11-09 NOTE — ED PROVIDER NOTES
EMERGENCY DEPARTMENT HISTORY AND PHYSICAL EXAM      Date: 11/9/2022  Patient Name: Leighann Phillips    History of Presenting Illness     Chief Complaint   Patient presents with    Fall       History Provided By: Patient    HPI: Leighann Phillips, 68 y.o. male with a past medical history significant for medical problems as stated below presents to the ED with cc of fall and hip pain. He notes that approximately 1:30 PM he was walking across the parking lot. He notes that his right hip gave out and he fell on his right side. He was walking with a cane and was able to soften the blow. He did fall and hit his head but did not lose consciousness. He denies any head pain but he notes his hip has been hurting. He denies normally having pain in the hip. He notes his pain is worse with movement and is very achy and and somewhat spasming. Denies any neurological deficits. He notes he takes aspirin but no other blood thinners. Denies any chest pain or difficulty breathing or nausea or vomiting. Notes he was feeling well before this. He did not syncopized or have any prodromal symptoms prior to this happening. After this happened to bypass or his help into his car he went home. His wife urged to go to the emergency department for evaluation. There are no associated symptoms. No other exacerbating or ameliorating factors. PCP: Mike Freitas MD    No current facility-administered medications on file prior to encounter. Current Outpatient Medications on File Prior to Encounter   Medication Sig Dispense Refill    cholecalciferol, vitamin D3, 50 mcg (2,000 unit) tab Take 1 Tablet by mouth.      metoprolol succinate (TOPROL-XL) 25 mg XL tablet Take 25 mg by mouth daily. DULoxetine (CYMBALTA) 30 mg capsule Take 30 mg by mouth two (2) times a day. sacubitril-valsartan (ENTRESTO) 49 mg/51 mg tablet Take 1 Tablet by mouth every twelve (12) hours.       furosemide (LASIX) 40 mg tablet Take 60 mg by mouth daily.      OTHER Please provide patient with catheters listed below: Catheter is: [address]ines, Male, FR 14/4.7mm. Please fax to [employer] 120 Each 0    niacin ER (NIASPAN) 750 mg Tb24 Take 1,500 mg by mouth nightly. potassium chloride SR (KLOR-CON 10) 10 mEq tablet Take 10 mEq by mouth daily. cetirizine (ZYRTEC) 10 mg tablet Take 10 mg by mouth nightly. (Patient not taking: Reported on 11/10/2022)      calcium polycarbophiL (Fiber Laxative, ca polycarbo,) 625 mg tablet Take 1,250 mg by mouth four (4) times daily. (Patient not taking: Reported on 11/10/2022)         Past History     Past Medical History:  Past Medical History:   Diagnosis Date    Chronic systolic congestive heart failure ([address] 8/14/2017    Essential hypertension 8/14/2017    Heart block 8/14/2017    Pacemaker 8/14/2017       Past Surgical History:  Past Surgical History:   Procedure Laterality Date    HX BLADDER REPAIR      HX CORONARY STENT PLACEMENT      HX HIP REPLACEMENT Left 12/2021    HX PACEMAKER      ALSO MULTI  GENERATOR CHANGES       Family History:  History reviewed. No pertinent family history. Social History:  Social History     Tobacco Use    Smoking status: Never   Substance Use Topics    Alcohol use: Never    Drug use: Never       Allergies: Allergies   Allergen Reactions    Celery Other (comments)     Mouth burns    Erythromycin Other (comments)     cramping    Green Pepper Nausea and Vomiting     Flu like S/S    Statins-Hmg-Coa Reductase Inhibitors Myalgia         Review of Systems   Review of Systems   Constitutional:  Negative for fatigue and fever. HENT:  Negative for congestion. Respiratory:  Negative for chest tightness and shortness of breath. Cardiovascular:  Negative for chest pain. Gastrointestinal:  Negative for abdominal pain, nausea and vomiting. Endocrine: Negative for polyuria. Genitourinary:  Negative for dysuria and flank pain.    Musculoskeletal:  Positive for arthralgias and myalgias. Negative for back pain, joint swelling, neck pain and neck stiffness. Skin:  Positive for wound. Negative for rash. Neurological:  Negative for dizziness and headaches. Hematological:  Does not bruise/bleed easily. Physical Exam   Physical Exam  Vitals reviewed. Constitutional:       Appearance: Normal appearance. HENT:      Head:      Comments: 4  centimeter firm hematoma to the right frontal region with small abrasion overlying. No skull fragments appreciated. Pupils are 2 mm and reactive. Mild right maxillary facial tenderness. No oral erythema or trauma. Ears are clear with no black sign negative raccoon sign. Eyes:      Extraocular Movements: Extraocular movements intact. Pupils: Pupils are equal, round, and reactive to light. Cardiovascular:      Rate and Rhythm: Normal rate and regular rhythm. Pulmonary:      Effort: Pulmonary effort is normal.      Breath sounds: Normal breath sounds. Abdominal:      General: Abdomen is flat. There is no distension. Palpations: Abdomen is soft. Musculoskeletal:      Cervical back: Normal range of motion. No tenderness. Comments: Tender to palpation over the right greater trochanter. Patient unable to lift the leg secondary to pain. Passive movement stopped secondary to pain. No pain in the bilateral ankles or knees. Neuro vas intact bilateral lower extremities bilateral upper extremities. Small abrasion of the right lateral epicondyle. Small abrasion over the dorsum of the right hand. DPs and radials 2+ bilateral upper and lower extremities. No CT or L-spine tenderness. No step-offs. Hips are stable   Skin:     Comments: Scattered abrasions over right elbow and right hand   Neurological:      General: No focal deficit present. Mental Status: He is alert and oriented to person, place, and time.    Psychiatric:         Mood and Affect: Mood normal.         Behavior: Behavior normal.       Diagnostic Study Results     Labs -     Recent Results (from the past 24 hour(s))   CBC WITH AUTOMATED DIFF    Collection Time: 11/09/22  4:52 PM   Result Value Ref Range    WBC 10.4 4.1 - 11.1 K/uL    RBC 4.32 4.10 - 5.70 M/uL    HGB 14.3 12.1 - 17.0 g/dL    HCT 43.0 36.6 - 50.3 %    MCV 99.5 (H) 80.0 - 99.0 FL    MCH 33.1 26.0 - 34.0 PG    MCHC 33.3 30.0 - 36.5 g/dL    RDW 13.2 11.5 - 14.5 %    PLATELET 114 955 - 475 K/uL    MPV 10.6 8.9 - 12.9 FL    NRBC 0.0 0  WBC    ABSOLUTE NRBC 0.00 0.00 - 0.01 K/uL    NEUTROPHILS 76 (H) 32 - 75 %    LYMPHOCYTES 13 12 - 49 %    MONOCYTES 9 5 - 13 %    EOSINOPHILS 2 0 - 7 %    BASOPHILS 0 0 - 1 %    IMMATURE GRANULOCYTES 0 0.0 - 0.5 %    ABS. NEUTROPHILS 7.8 1.8 - 8.0 K/UL    ABS. LYMPHOCYTES 1.4 0.8 - 3.5 K/UL    ABS. MONOCYTES 1.0 0.0 - 1.0 K/UL    ABS. EOSINOPHILS 0.2 0.0 - 0.4 K/UL    ABS. BASOPHILS 0.0 0.0 - 0.1 K/UL    ABS. IMM. GRANS. 0.0 0.00 - 0.04 K/UL    DF AUTOMATED     METABOLIC PANEL, COMPREHENSIVE    Collection Time: 11/09/22  4:52 PM   Result Value Ref Range    Sodium 140 136 - 145 mmol/L    Potassium 3.3 (L) 3.5 - 5.1 mmol/L    Chloride 104 97 - 108 mmol/L    CO2 31 21 - 32 mmol/L    Anion gap 5 5 - 15 mmol/L    Glucose 130 (H) 65 - 100 mg/dL    BUN 13 6 - 20 MG/DL    Creatinine 0.88 0.70 - 1.30 MG/DL    BUN/Creatinine ratio 15 12 - 20      eGFR >60 >60 ml/min/1.73m2    Calcium 9.7 8.5 - 10.1 MG/DL    Bilirubin, total 0.7 0.2 - 1.0 MG/DL    ALT (SGPT) 24 12 - 78 U/L    AST (SGOT) 20 15 - 37 U/L    Alk.  phosphatase 114 45 - 117 U/L    Protein, total 7.3 6.4 - 8.2 g/dL    Albumin 3.2 (L) 3.5 - 5.0 g/dL    Globulin 4.1 (H) 2.0 - 4.0 g/dL    A-G Ratio 0.8 (L) 1.1 - 2.2     PROTHROMBIN TIME + INR    Collection Time: 11/09/22  4:52 PM   Result Value Ref Range    INR 1.0 0.9 - 1.1      Prothrombin time 10.3 9.0 - 11.1 sec   TYPE & SCREEN    Collection Time: 11/10/22  5:30 AM   Result Value Ref Range    Crossmatch Expiration 11/13/2022,2359     ABO/Rh(D) Clarisse Grissom POSITIVE     Antibody screen NEG    URINALYSIS W/ REFLEX CULTURE    Collection Time: 11/10/22  5:30 AM    Specimen: Urine   Result Value Ref Range    Color DARK YELLOW      Appearance CLEAR CLEAR      Specific gravity 1.023      pH (UA) 6.0 5.0 - 8.0      Protein 30 (A) NEG mg/dL    Glucose Negative NEG mg/dL    Ketone 15 (A) NEG mg/dL    Bilirubin Negative NEG      Blood Negative NEG      Urobilinogen 1.0 0.2 - 1.0 EU/dL    Nitrites Negative NEG      Leukocyte Esterase TRACE (A) NEG      WBC 0-4 0 - 4 /hpf    RBC 0-5 0 - 5 /hpf    Epithelial cells FEW FEW /lpf    Bacteria Negative NEG /hpf    UA:UC IF INDICATED CULTURE NOT INDICATED BY UA RESULT CNI      Mucus 2+ (A) NEG /lpf   METABOLIC PANEL, BASIC    Collection Time: 11/10/22  5:30 AM   Result Value Ref Range    Sodium 139 136 - 145 mmol/L    Potassium 3.5 3.5 - 5.1 mmol/L    Chloride 103 97 - 108 mmol/L    CO2 29 21 - 32 mmol/L    Anion gap 7 5 - 15 mmol/L    Glucose 121 (H) 65 - 100 mg/dL    BUN 14 6 - 20 MG/DL    Creatinine 0.81 0.70 - 1.30 MG/DL    BUN/Creatinine ratio 17 12 - 20      eGFR >60 >60 ml/min/1.73m2    Calcium 8.5 8.5 - 10.1 MG/DL   CBC W/O DIFF    Collection Time: 11/10/22  5:30 AM   Result Value Ref Range    WBC 13.4 (H) 4.1 - 11.1 K/uL    RBC 3.93 (L) 4.10 - 5.70 M/uL    HGB 12.8 12.1 - 17.0 g/dL    HCT 39.7 36.6 - 50.3 %    .0 (H) 80.0 - 99.0 FL    MCH 32.6 26.0 - 34.0 PG    MCHC 32.2 30.0 - 36.5 g/dL    RDW 13.3 11.5 - 14.5 %    PLATELET 724 047 - 507 K/uL    MPV 11.5 8.9 - 12.9 FL    NRBC 0.0 0  WBC    ABSOLUTE NRBC 0.00 0.00 - 0.01 K/uL   TROPONIN-HIGH SENSITIVITY    Collection Time: 11/10/22  5:30 AM   Result Value Ref Range    Troponin-High Sensitivity 148 (HH) 0 - 76 ng/L   TROPONIN-HIGH SENSITIVITY    Collection Time: 11/10/22 11:02 AM   Result Value Ref Range    Troponin-High Sensitivity 317 (HH) 0 - 76 ng/L   NT-PRO BNP    Collection Time: 11/10/22 11:02 AM   Result Value Ref Range    NT pro-BNP 5,395 (H) <450 PG/ML       Radiologic Studies -   XR CHEST PORT   Final Result   No Acute Disease. CT PELV WO CONT   Final Result      Mildly impacted subcapital right femoral neck fracture. Mild right hip   osteoarthritis. CT HEAD WO CONT   Final Result   No acute intracranial finding. XR HIP RT W OR WO PELV 2-3 VWS   Final Result   Questionable nondisplaced right femoral neck fracture. CT Results  (Last 48 hours)                 11/09/22 1806  CT PELV WO CONT Final result    Impression:      Mildly impacted subcapital right femoral neck fracture. Mild right hip   osteoarthritis. Narrative:  EXAM: CT PELV WO CONT       INDICATION: Right hip pain, right femoral neck fracture. COMPARISON: None       TECHNIQUE: Helical CT of the bony pelvis with coronal and sagittal reformats. Images reviewed in soft tissue and bone windows. CT dose reduction was achieved   through the use of a standardized protocol tailored for this examination and   automatic exposure control for dose modulation. CONTRAST: None. FINDINGS: Bones: Bones are osteopenic. Subcapital right femoral neck fracture is   1.3 cm impacted and mildly posteriorly angulated. No underlying bone lesion. No other fracture. Hardware: Metal artifact arises from left hip prosthesis. No surrounding   lucency. Spine: Degenerative disc disease and facet arthrosis. Joint fluid: None. Articulations: Mild right hip osteoarthritis. Tendons: No full-thickness tendon tear. Muscles: Heterogeneous moderate atrophy. Soft tissue mass: None. Small fat-containing right inguinal hernia. 11/09/22 1624  CT HEAD WO CONT Final result    Impression:  No acute intracranial finding. Narrative:  INDICATION: Fall, head trauma       EXAM: CT HEAD without contrast.       TECHNIQUE: Unenhanced CT Head is performed.  CT dose reduction was achieved   through use of a standardized protocol tailored for this examination and   automatic exposure control for dose modulation. FINDINGS:   No acute infarct is seen. There is no apparent mass on unenhanced imaging. There   is no bleed, shift, obstructive hydrocephalus or significant extra-axial fluid   collection. Bone windows are unremarkable. CXR Results  (Last 48 hours)                 11/09/22 1856  XR CHEST PORT Final result    Impression:  No Acute Disease. Narrative:  EXAM: Portable CXR. 1848 hours. INDICATION: fall, right chest pain       FINDINGS:   The lungs show no acute finding. Heart is normal in size. There is no pulmonary   edema. There is no evident pneumothorax or pleural effusion. ICD is present. Medical Decision Making   I am the first provider for this patient. I reviewed the vital signs, available nursing notes, past medical history, past surgical history, family history and social history. Vital Signs-Reviewed the patient's vital signs. Patient Vitals for the past 12 hrs:   Temp Pulse Resp BP SpO2   11/10/22 1251 98.7 °F (37.1 °C) 81 18 118/75 93 %   11/10/22 1107 98.2 °F (36.8 °C) 78 29 120/71 93 %   11/10/22 0800 -- 83 30 (!) 141/100 94 %   11/10/22 0715 98.4 °F (36.9 °C) 80 28 (!) 144/81 95 %   11/10/22 0303 98.1 °F (36.7 °C) 70 29 126/70 95 %       Records Reviewed: Nursing records and medical records reviewed    MDM:  DDx includes fall, head trauma, intracranial hemorrhage, skull fracture, hematoma, hip fracture, hip dislocation    Provider Notes (Medical Decision Making):   79-year-old male with history of CHF, heart block with pacemaker, hypertension presents with mechanical fall. Vital signs are stable upon arrival.  He appears well and is in no acute distress. He is in a nonfocal neurological exam but does have pain over the right greater trochanter and a large hematoma to the right frontal scalp. Obtain head imaging as well as hip x-ray.   Suspect dislocation or fracture of the right hip given the amount of pain he is in. We will treat pain as well. ED Course:   Initial assessment performed. The patients presenting problems have been discussed, and they are in agreement with the care plan formulated and outlined with them. I have encouraged them to ask questions as they arise throughout their visit. ED Course as of 11/10/22 1448   Wed Nov 09, 2022   1639 X-ray shows questionable nondisplaced femoral neck fracture. Exam does suggest skeletal pathology. Ortho paged. Will obtain CT to evaluate further. [JS]      ED Course User Index  [JS] Anne Andrea MD           Disposition:    1. Admit to Hospitalist    Admission Note:  2:47 PM  Patient is being admitted to the hospital by Dr. Vane Conley. The results of their tests and reasons for their admission have been discussed with them and available family. They convey agreement and understanding for the need to be admitted and for their admission diagnosis. DISCHARGE PLAN:  1. Current Discharge Medication List        2. Follow-up Information    None       3. Return to ED if worse     Diagnosis     Clinical Impression:   1. Closed fracture of neck of right femur, initial encounter [facility])        Attestations:    Roshni Fermin MD    Please note that this dictation was completed with [employer], the computer voice recognition software. Quite often unanticipated grammatical, syntax, homophones, and other interpretive errors are inadvertently transcribed by the computer software. Please disregard these errors. Please excuse any errors that have escaped final proofreading. Thank you.

## 2022-11-09 NOTE — ACP (ADVANCE CARE PLANNING)
Advance Care Planning Note      NAME: Kenya Meade   :  1945   MRN:  [id number]     Date/Time:  2022 6:37 PM    Active Diagnoses:  Hospital Problems  Date Reviewed: 2021            Codes Class Noted POA    Right hip pain ICD-10-CM: M25.551  ICD-9-CM: 719.45  2022 Unknown        Closed right hip fracture [facility]) ICD-10-CM: S72.001A  ICD-9-CM: 820.8  2022 Unknown       Complete heart block s/p pacemaker placement  Congestive heart failure s/p AICD  Hypertension  Neuropathy    These active diagnoses are of sufficient risk that focused discussion on advance care planning is indicated in order to allow the patient to thoughtfully consider personal goals of care, and if situations arise that prevent the ability to personally give input, to ensure appropriate representation of their personal desires for different levels and aggressiveness of care. Discussion:   Code status addressed and wants to be a Full Code. Patient wants central line and vasopressors if needed. Patient would also want a feeding tube, if needed, for nutritional support. Patient  would like to assign wife Jocelyn Clayton as the surrogate decision maker. Persons present and participating in discussion: Jerry Daily MD.      Time Spent:   Total time spent face-to-face in education and discussion:   16   minutes.          Layla Mckeon MD   Hospitalist

## 2022-11-09 NOTE — Clinical Note
TRANSFER - OUT REPORT:     Verbal report given to: Sumaya. Report consisted of patient's Situation, Background, Assessment and   Recommendations(SBAR). Opportunity for questions and clarification was provided. Patient transported with a Registered Nurse.

## 2022-11-09 NOTE — H&P
Hospitalist Admission Note    NAME: Antonio Reich   :  1945   MRN:  [id number]     Date/Time:  2022 6:29 PM    Patient PCP: Brendon Quinn MD  ______________________________________________________________________  Given the patient's current clinical presentation, I have a high level of concern for decompensation if discharged from the emergency department. Complex decision making was performed, which includes reviewing the patient's available past medical records, laboratory results, and x-ray films. My assessment of this patient's clinical condition and my plan of care is as follows. Assessment / Plan:  Possible nondisplaced right femoral fracture  -CT of right hip is pending. We will start oxycodone and morphine for pain control. Insert Workman fracture is confirmed. Orthopedics consulted  Preoperative evaluation:  Based on revised cardiac risk index scoring system, patient has 2.4% risk of rate of cardiac death, nonfatal myocardial infarction and nonfatal cardiac arrest.  He also has about 3.6% rate of myocardial infarction, pulmonary edema, ventricular fibrillation, primary cardiac arrest and complete heart block. You may proceed with surgery if you feel the risk is acceptable with your team and anesthesia. -Check troponin and proBNP. Check twelve-lead EKG. Hypertension  Systolic congestive heart failure s/p AICD  Neuropathy  Congestive heart failure s/p pacemaker  Dyslipidemia  -Continue home metoprolol, Lasix, Entresto, Cymbalta  -Continue niacin      Code Status: Full code  Surrogate Decision Maker: Wife Huseyin Johnson    DVT Prophylaxis: Start after surgery in a.m.   GI Prophylaxis: not indicated    Baseline: From home, independent of ADLs      Subjective:   CHIEF COMPLAINT: Right hip pain    HISTORY OF PRESENT ILLNESS:     Elaina Downs is a 68 y.o.   male who presents with past medical history of congestive heart failure s/p AICD, complete heart block s/p pacemaker, hypertension is coming the hospital chief complaints of fall and also right hip pain. Patient reports pain is about 7 x 10, increases with weightbearing and also with activity and without any relieving factors. He also reports some trauma to the right side of head during the time of fall but does not report any headache. Does not report any lightheadedness, dizziness or passing out. Does not report any chest pain or shortness of breath. No fever or chills. On arrival to ED, noted to have stable vitals. On labs CBC is normal.  BMP shows a potassium of 3.3, creatinine of 0.88. LFTs are normal.  CT head shows no acute process. X-ray of right hip shows questionable right femur fracture and CT is pending    We were asked to admit for work up and evaluation of the above problems. Past Medical History:   Diagnosis Date    Chronic systolic congestive heart failure ([facility] [address] 8/14/2017    Essential hypertension 8/14/2017    Heart block 8/14/2017    Pacemaker 8/14/2017        Past Surgical History:   Procedure Laterality Date    HX BLADDER REPAIR      HX CORONARY STENT PLACEMENT      HX PACEMAKER         Social History     Tobacco Use    Smoking status: Never    Smokeless tobacco: Not on file   Substance Use Topics    Alcohol use: Never        Family history  No CAD in the family    Allergies   Allergen Reactions    Celery Other (comments)     Mouth burns    Erythromycin Other (comments)     cramping    Green Pepper Nausea and Vomiting     Flu like S/S    Statins-Hmg-Coa Reductase Inhibitors Myalgia        Prior to Admission medications    Medication Sig Start Date End Date Taking? Authorizing Provider   metoprolol succinate (Toprol XL) 25 mg XL tablet Take 25 mg by mouth daily. Provider, Historical   niacin ER (NIASPAN) 750 mg Tb24 Take 1,500 mg by mouth nightly. Provider, Historical   potassium chloride SR (KLOR-CON 10) 10 mEq tablet Take 10 mEq by mouth daily.     Provider, Historical   cetirizine (ZYRTEC) 10 mg tablet Take 10 mg by mouth nightly. Provider, Historical   DULoxetine (CYMBALTA) 30 mg capsule Take 30 mg by mouth daily. Provider, Historical   calcium polycarbophiL (Fiber Laxative, ca polycarbo,) 625 mg tablet Take 1,250 mg by mouth four (4) times daily. Provider, Historical   sacubitriL-valsartan Wandra Riccardo) 24-26 mg tablet Take 1 Tablet by mouth two (2) times a day. Provider, Historical   furosemide (LASIX) 40 mg tablet Take 60 mg by mouth daily. Other, MD Jessica   OTHER Please provide patient with catheters listed below: Catheter is: [facility], Male, FR 14/4.7mm. Please fax to [facility] 3/1/21   Rony López MD       REVIEW OF SYSTEMS:     I am not able to complete the review of systems because:    The patient is intubated and sedated    The patient has altered mental status due to his acute medical problems    The patient has baseline aphasia from prior stroke(s)    The patient has baseline dementia and is not reliable historian    The patient is in acute medical distress and unable to provide information           Total of 12 systems reviewed as follows:       POSITIVE= underlined text  Negative = text not underlined  General:  fever, chills, sweats, generalized weakness, weight loss/gain,      loss of appetite   Eyes:    blurred vision, eye pain, loss of vision, double vision  ENT:    rhinorrhea, pharyngitis   Respiratory:   cough, sputum production, SOB, SOLARES, wheezing, pleuritic pain   Cardiology:   chest pain, palpitations, orthopnea, PND, edema, syncope   Gastrointestinal:  abdominal pain , N/V, diarrhea, dysphagia, constipation, bleeding   Genitourinary:  frequency, urgency, dysuria, hematuria, incontinence   Muskuloskeletal :  Right hip tenderness  Hematology:  easy bruising, nose or gum bleeding, lymphadenopathy   Dermatological: Right forehead hematoma  Endocrine:   hot flashes or polydipsia   Neurological:  headache, dizziness, confusion, focal weakness, paresthesia,     Speech difficulties, memory loss, gait difficulty  Psychological: Feelings of anxiety, depression, agitation    Objective:   VITALS:    Visit Vitals  /84   Pulse 73   Temp 98.2 °F (36.8 °C)   Resp 16   Ht 6' 6\" (1.981 m)   Wt 106.6 kg (235 lb)   SpO2 95%   BMI 27.16 kg/m²       PHYSICAL EXAM:    General:    Alert, cooperative, no distress, appears stated age. HEENT: Atraumatic, anicteric sclerae, pink conjunctivae     No oral ulcers, mucosa moist  Neck:  Supple, symmetrical,  thyroid: non tender  Lungs:   Clear to auscultation bilaterally. No Wheezing or Rhonchi. No rales. Chest wall:  No tenderness  No Accessory muscle use. Heart:   Regular  rhythm,  No  murmur   No edema  Abdomen:   Soft, non-tender. Not distended. Bowel sounds normal  Extremities: Right hip tenderness present  Skin:     Not pale. Not Jaundiced  No rashes   Psych:  Not anxious or agitated. Neurologic: EOMs intact. No facial asymmetry. No aphasia or slurred speech. Symmetrical strength, Sensation grossly intact.  Alert and oriented X 4.     _______________________________________________________________________  Care Plan discussed with:    Comments   Patient y    Family      RN y    Care Manager                    Consultant:      _______________________________________________________________________  Expected  Disposition:   Home with Family y   HH/PT/OT/RN    SNF/LTC    RENZO    ________________________________________________________________________  TOTAL TIME:  61  Minutes    Critical Care Provided     Minutes non procedure based      Comments    y Reviewed previous records   >50% of visit spent in counseling and coordination of care y Discussion with patient and/or family and questions answered       ________________________________________________________________________  Signed: Chris Butterfield MD    Procedures: see electronic medical records for all procedures/Xrays and details which were not copied into this note but were reviewed prior to creation of Plan. LAB DATA REVIEWED:    Recent Results (from the past 24 hour(s))   CBC WITH AUTOMATED DIFF    Collection Time: 11/09/22  4:52 PM   Result Value Ref Range    WBC 10.4 4.1 - 11.1 K/uL    RBC 4.32 4.10 - 5.70 M/uL    HGB 14.3 12.1 - 17.0 g/dL    HCT 43.0 36.6 - 50.3 %    MCV 99.5 (H) 80.0 - 99.0 FL    MCH 33.1 26.0 - 34.0 PG    MCHC 33.3 30.0 - 36.5 g/dL    RDW 13.2 11.5 - 14.5 %    PLATELET 996 302 - 359 K/uL    MPV 10.6 8.9 - 12.9 FL    NRBC 0.0 0  WBC    ABSOLUTE NRBC 0.00 0.00 - 0.01 K/uL    NEUTROPHILS 76 (H) 32 - 75 %    LYMPHOCYTES 13 12 - 49 %    MONOCYTES 9 5 - 13 %    EOSINOPHILS 2 0 - 7 %    BASOPHILS 0 0 - 1 %    IMMATURE GRANULOCYTES 0 0.0 - 0.5 %    ABS. NEUTROPHILS 7.8 1.8 - 8.0 K/UL    ABS. LYMPHOCYTES 1.4 0.8 - 3.5 K/UL    ABS. MONOCYTES 1.0 0.0 - 1.0 K/UL    ABS. EOSINOPHILS 0.2 0.0 - 0.4 K/UL    ABS. BASOPHILS 0.0 0.0 - 0.1 K/UL    ABS. IMM. GRANS. 0.0 0.00 - 0.04 K/UL    DF AUTOMATED     METABOLIC PANEL, COMPREHENSIVE    Collection Time: 11/09/22  4:52 PM   Result Value Ref Range    Sodium 140 136 - 145 mmol/L    Potassium 3.3 (L) 3.5 - 5.1 mmol/L    Chloride 104 97 - 108 mmol/L    CO2 31 21 - 32 mmol/L    Anion gap 5 5 - 15 mmol/L    Glucose 130 (H) 65 - 100 mg/dL    BUN 13 6 - 20 MG/DL    Creatinine 0.88 0.70 - 1.30 MG/DL    BUN/Creatinine ratio 15 12 - 20      eGFR >60 >60 ml/min/1.73m2    Calcium 9.7 8.5 - 10.1 MG/DL    Bilirubin, total 0.7 0.2 - 1.0 MG/DL    ALT (SGPT) 24 12 - 78 U/L    AST (SGOT) 20 15 - 37 U/L    Alk.  phosphatase 114 45 - 117 U/L    Protein, total 7.3 6.4 - 8.2 g/dL    Albumin 3.2 (L) 3.5 - 5.0 g/dL    Globulin 4.1 (H) 2.0 - 4.0 g/dL    A-G Ratio 0.8 (L) 1.1 - 2.2     PROTHROMBIN TIME + INR    Collection Time: 11/09/22  4:52 PM   Result Value Ref Range    INR 1.0 0.9 - 1.1      Prothrombin time 10.3 9.0 - 11.1 sec

## 2022-11-09 NOTE — ED TRIAGE NOTES
Pt arrived via EMS from home c/o trip and fall. C/o right hip pain and noted hematoma to the right side of forehead. Pt is on blood thinners. Denies LOC.

## 2022-11-09 NOTE — CONSULTS
ORTHOPAEDIC CONSULT NOTE    Subjective:     Date of Consultation:  November 9, 2022      Orin Borges is a 68 y.o. male who is being seen for right hip pain. Pt reports GLF, states his leg just gave out on him. Pt was unable to WB/ambulate on the RLE after fall. Ambulates at baseline unassisted. Pt's wife at bedside. S/P left hip fx(JOSE) 11/2021    Patient Active Problem List    Diagnosis Date Noted    Hip fracture ([facility] [address] 11/29/2021    Chronic systolic congestive heart failure ([facility] [address] 08/14/2017    Heart block 08/14/2017    Pacemaker 08/14/2017    Essential hypertension 08/14/2017     No family history on file. Social History     Tobacco Use    Smoking status: Never    Smokeless tobacco: Not on file   Substance Use Topics    Alcohol use: Never     Past Medical History:   Diagnosis Date    Chronic systolic congestive heart failure ([facility] [address] 8/14/2017    Essential hypertension 8/14/2017    Heart block 8/14/2017    Pacemaker 8/14/2017      Past Surgical History:   Procedure Laterality Date    HX BLADDER REPAIR      HX CORONARY STENT PLACEMENT      HX PACEMAKER        Prior to Admission medications    Medication Sig Start Date End Date Taking? Authorizing Provider   metoprolol succinate (Toprol XL) 25 mg XL tablet Take 25 mg by mouth daily. Provider, Historical   niacin ER (NIASPAN) 750 mg Tb24 Take 1,500 mg by mouth nightly. Provider, Historical   potassium chloride SR (KLOR-CON 10) 10 mEq tablet Take 10 mEq by mouth daily. Provider, Historical   cetirizine (ZYRTEC) 10 mg tablet Take 10 mg by mouth nightly. Provider, Historical   DULoxetine (CYMBALTA) 30 mg capsule Take 30 mg by mouth daily. Provider, Historical   calcium polycarbophiL (Fiber Laxative, ca polycarbo,) 625 mg tablet Take 1,250 mg by mouth four (4) times daily. Provider, Historical   sacubitriL-valsartan Janice Ades) 24-26 mg tablet Take 1 Tablet by mouth two (2) times a day.     Provider, Historical   furosemide (LASIX) 40 mg tablet Take 60 mg by mouth daily. Other, MD Jessica   OTHER Please provide patient with catheters listed below: Catheter is: [address], Male, FR 14/4.7mm. Please fax to [facility] 3/1/21   Rafael López MD     No current facility-administered medications for this encounter. Current Outpatient Medications   Medication Sig    metoprolol succinate (Toprol XL) 25 mg XL tablet Take 25 mg by mouth daily. niacin ER (NIASPAN) 750 mg Tb24 Take 1,500 mg by mouth nightly. potassium chloride SR (KLOR-CON 10) 10 mEq tablet Take 10 mEq by mouth daily. cetirizine (ZYRTEC) 10 mg tablet Take 10 mg by mouth nightly. DULoxetine (CYMBALTA) 30 mg capsule Take 30 mg by mouth daily. calcium polycarbophiL (Fiber Laxative, ca polycarbo,) 625 mg tablet Take 1,250 mg by mouth four (4) times daily. sacubitriL-valsartan (Entresto) 24-26 mg tablet Take 1 Tablet by mouth two (2) times a day. furosemide (LASIX) 40 mg tablet Take 60 mg by mouth daily. OTHER Please provide patient with catheters listed below: Catheter is: [address], Male, FR 14/4.7mm. Please fax to [address] [place] [address] Other (comments)     Mouth burns    Erythromycin Other (comments)     cramping    Green Pepper Nausea and Vomiting     Flu like S/S    Statins-Hmg-Coa Reductase Inhibitors Myalgia        Review of Systems:  A comprehensive review of systems was negative except for that written in the HPI.     Mental Status: no dementia    Objective:     Patient Vitals for the past 8 hrs:   BP Temp Pulse Resp SpO2 Height Weight   22 1454 126/80 98.2 °F (36.8 °C) 70 16 95 % 6' 6\" (1.981 m) 106.6 kg (235 lb)     Temp (24hrs), Av.2 °F (36.8 °C), Min:98.2 °F (36.8 °C), Max:98.2 °F (36.8 °C)      Gen: Well-developed,  in no acute distress   HEENT: Pink conjunctivae, hearing intact to voice, moist mucous membranes   Neck: Supple  Resp: No respiratory distress   Card: RRR, palpable distal pulse-equal bilaterally, birsk cap refill all distal digits   Abd:  non-distended  Musc: No sig leg length discrepancy. No sign of LE DVT. No knee effusion. Intact plant/dorsiflexion. Pain with right thigh log roll. Skin: No skin breakdown noted. Skin warm, pink, dry  Neuro: Cranial nerves are grossly intact, no focal motor weakness, follows commands appropriately   Psych: Good insight, oriented to person, place and time, alert    Imaging Review: EXAM: XR HIP RT W OR WO PELV 2-3 VWS   INDICATION: Fall, hip pain. COMPARISON: None. FINDINGS: AP view of the pelvis/hips and a true lateral view of the right hip  are obtained. There is questionable nondisplaced femoral neck fracture. The  femoral head is located. Pelvic ring appears intact. Left JOSE is noted. IMPRESSION  Questionable nondisplaced right femoral neck fracture. Labs: No results found for this or any previous visit (from the past 24 hour(s)). Impression:     Patient Active Problem List    Diagnosis Date Noted    Hip fracture ([facility] [address] 11/29/2021    Chronic systolic congestive heart failure ([facility] [address] 08/14/2017    Heart block 08/14/2017    Pacemaker 08/14/2017    Essential hypertension 08/14/2017     Active Problems:    * No active hospital problems. *      Plan:     -  CT scan of the right hip ordered for fx clarification, surgical planning-- right femoral neck fx with subtle angulation/impaction deformity   -  NPO after midnight  -  Hold camp if pt can utilize urinal w/o difficulty. -  Bedrest  -  Plan for right hip arthroplasty 11/10  -  Medicine for Pre-Operative [place]/ Post-Operative management.     -  DVT Prophylaxis - SCDs  -  D/C planning - TBD      Dr. Vianey aKur aware and agrees with plan as above.         Raudel Swan PA-C  [employer]

## 2022-11-10 ENCOUNTER — ANESTHESIA EVENT (OUTPATIENT)
Dept: SURGERY | Age: 77
DRG: 521 | End: 2022-11-10
Payer: MEDICARE

## 2022-11-10 ENCOUNTER — APPOINTMENT (OUTPATIENT)
Dept: GENERAL RADIOLOGY | Age: 77
DRG: 521 | End: 2022-11-10
Attending: REGISTERED NURSE
Payer: MEDICARE

## 2022-11-10 ENCOUNTER — ANESTHESIA (OUTPATIENT)
Dept: SURGERY | Age: 77
DRG: 521 | End: 2022-11-10
Payer: MEDICARE

## 2022-11-10 LAB
ANION GAP SERPL CALC-SCNC: 7 MMOL/L (ref 5–15)
ANION GAP SERPL CALC-SCNC: 8 MMOL/L (ref 5–15)
APPEARANCE UR: CLEAR
ARTERIAL PATENCY WRIST A: ABNORMAL
ATRIAL RATE: 84 BPM
BACTERIA URNS QL MICRO: NEGATIVE /HPF
BASE DEFICIT BLD-SCNC: 0.8 MMOL/L
BDY SITE: ABNORMAL
BILIRUB UR QL: NEGATIVE
BNP SERPL-MCNC: 5395 PG/ML
BUN SERPL-MCNC: 14 MG/DL (ref 6–20)
BUN SERPL-MCNC: 14 MG/DL (ref 6–20)
BUN/CREAT SERPL: 15 (ref 12–20)
BUN/CREAT SERPL: 17 (ref 12–20)
CALCIUM SERPL-MCNC: 8.5 MG/DL (ref 8.5–10.1)
CALCIUM SERPL-MCNC: 8.7 MG/DL (ref 8.5–10.1)
CALCULATED R AXIS, ECG10: 179 DEGREES
CALCULATED T AXIS, ECG11: 48 DEGREES
CHLORIDE SERPL-SCNC: 102 MMOL/L (ref 97–108)
CHLORIDE SERPL-SCNC: 103 MMOL/L (ref 97–108)
CO2 SERPL-SCNC: 26 MMOL/L (ref 21–32)
CO2 SERPL-SCNC: 29 MMOL/L (ref 21–32)
COLOR UR: ABNORMAL
CREAT SERPL-MCNC: 0.81 MG/DL (ref 0.7–1.3)
CREAT SERPL-MCNC: 0.91 MG/DL (ref 0.7–1.3)
DIAGNOSIS, 93000: NORMAL
EPITH CASTS URNS QL MICRO: ABNORMAL /LPF
ERYTHROCYTE [DISTWIDTH] IN BLOOD BY AUTOMATED COUNT: 13.3 % (ref 11.5–14.5)
GAS FLOW.O2 O2 DELIVERY SYS: ABNORMAL L/MIN
GAS FLOW.O2 SETTING OXYMISER: 12 BPM
GLUCOSE SERPL-MCNC: 121 MG/DL (ref 65–100)
GLUCOSE SERPL-MCNC: 147 MG/DL (ref 65–100)
GLUCOSE UR STRIP.AUTO-MCNC: NEGATIVE MG/DL
HCO3 BLD-SCNC: 23.3 MMOL/L (ref 22–26)
HCT VFR BLD AUTO: 39.7 % (ref 36.6–50.3)
HGB BLD-MCNC: 12.8 G/DL (ref 12.1–17)
HGB UR QL STRIP: NEGATIVE
KETONES UR QL STRIP.AUTO: 15 MG/DL
LEUKOCYTE ESTERASE UR QL STRIP.AUTO: ABNORMAL
MAGNESIUM SERPL-MCNC: 2.1 MG/DL (ref 1.6–2.4)
MCH RBC QN AUTO: 32.6 PG (ref 26–34)
MCHC RBC AUTO-ENTMCNC: 32.2 G/DL (ref 30–36.5)
MCV RBC AUTO: 101 FL (ref 80–99)
MUCOUS THREADS URNS QL MICRO: ABNORMAL /LPF
NITRITE UR QL STRIP.AUTO: NEGATIVE
NRBC # BLD: 0 K/UL (ref 0–0.01)
NRBC BLD-RTO: 0 PER 100 WBC
O2/TOTAL GAS SETTING VFR VENT: 100 %
P-R INTERVAL, ECG05: 156 MS
PCO2 BLD: 36.5 MMHG (ref 35–45)
PEEP RESPIRATORY: 5 CMH2O
PH BLD: 7.41 [PH] (ref 7.35–7.45)
PH UR STRIP: 6 [PH] (ref 5–8)
PLATELET # BLD AUTO: 205 K/UL (ref 150–400)
PMV BLD AUTO: 11.5 FL (ref 8.9–12.9)
PO2 BLD: 64 MMHG (ref 80–100)
POTASSIUM SERPL-SCNC: 3.5 MMOL/L (ref 3.5–5.1)
POTASSIUM SERPL-SCNC: 3.5 MMOL/L (ref 3.5–5.1)
PROT UR STRIP-MCNC: 30 MG/DL
Q-T INTERVAL, ECG07: 410 MS
QRS DURATION, ECG06: 140 MS
QTC CALCULATION (BEZET), ECG08: 484 MS
RBC # BLD AUTO: 3.93 M/UL (ref 4.1–5.7)
RBC #/AREA URNS HPF: ABNORMAL /HPF (ref 0–5)
SAO2 % BLD: 92.6 % (ref 92–97)
SODIUM SERPL-SCNC: 136 MMOL/L (ref 136–145)
SODIUM SERPL-SCNC: 139 MMOL/L (ref 136–145)
SP GR UR REFRACTOMETRY: 1.02
SPECIMEN TYPE: ABNORMAL
TROPONIN-HIGH SENSITIVITY: 148 NG/L (ref 0–76)
TROPONIN-HIGH SENSITIVITY: 317 NG/L (ref 0–76)
UA: UC IF INDICATED,UAUC: ABNORMAL
UROBILINOGEN UR QL STRIP.AUTO: 1 EU/DL (ref 0.2–1)
VENTILATION MODE VENT: ABNORMAL
VENTRICULAR RATE, ECG03: 84 BPM
VT SETTING VENT: 500 ML
WBC # BLD AUTO: 13.4 K/UL (ref 4.1–11.1)
WBC URNS QL MICRO: ABNORMAL /HPF (ref 0–4)

## 2022-11-10 PROCEDURE — 83880 ASSAY OF NATRIURETIC PEPTIDE: CPT

## 2022-11-10 PROCEDURE — 74011250636 HC RX REV CODE- 250/636: Performed by: HOSPITALIST

## 2022-11-10 PROCEDURE — 80048 BASIC METABOLIC PNL TOTAL CA: CPT

## 2022-11-10 PROCEDURE — 71045 X-RAY EXAM CHEST 1 VIEW: CPT

## 2022-11-10 PROCEDURE — 74011000250 HC RX REV CODE- 250: Performed by: INTERNAL MEDICINE

## 2022-11-10 PROCEDURE — 77030010507 HC ADH SKN DERMBND J&J -B: Performed by: ORTHOPAEDIC SURGERY

## 2022-11-10 PROCEDURE — 74011250637 HC RX REV CODE- 250/637: Performed by: ORTHOPAEDIC SURGERY

## 2022-11-10 PROCEDURE — B2111ZZ FLUOROSCOPY OF MULTIPLE CORONARY ARTERIES USING LOW OSMOLAR CONTRAST: ICD-10-PCS | Performed by: INTERNAL MEDICINE

## 2022-11-10 PROCEDURE — 77030020788: Performed by: ORTHOPAEDIC SURGERY

## 2022-11-10 PROCEDURE — 74011000250 HC RX REV CODE- 250: Performed by: REGISTERED NURSE

## 2022-11-10 PROCEDURE — 74011000250 HC RX REV CODE- 250: Performed by: PHYSICIAN ASSISTANT

## 2022-11-10 PROCEDURE — C1769 GUIDE WIRE: HCPCS | Performed by: INTERNAL MEDICINE

## 2022-11-10 PROCEDURE — 83735 ASSAY OF MAGNESIUM: CPT

## 2022-11-10 PROCEDURE — 74011000258 HC RX REV CODE- 258: Performed by: HOSPITALIST

## 2022-11-10 PROCEDURE — 74011250636 HC RX REV CODE- 250/636: Performed by: REGISTERED NURSE

## 2022-11-10 PROCEDURE — 74011000258 HC RX REV CODE- 258: Performed by: REGISTERED NURSE

## 2022-11-10 PROCEDURE — 74011000250 HC RX REV CODE- 250: Performed by: HOSPITALIST

## 2022-11-10 PROCEDURE — 5A1935Z RESPIRATORY VENTILATION, LESS THAN 24 CONSECUTIVE HOURS: ICD-10-PCS | Performed by: INTERNAL MEDICINE

## 2022-11-10 PROCEDURE — 99152 MOD SED SAME PHYS/QHP 5/>YRS: CPT | Performed by: INTERNAL MEDICINE

## 2022-11-10 PROCEDURE — C1894 INTRO/SHEATH, NON-LASER: HCPCS | Performed by: INTERNAL MEDICINE

## 2022-11-10 PROCEDURE — 76060000033 HC ANESTHESIA 1 TO 1.5 HR: Performed by: ORTHOPAEDIC SURGERY

## 2022-11-10 PROCEDURE — 77030013797 HC KT TRNSDUC PRSSR EDWD -A: Performed by: ANESTHESIOLOGY

## 2022-11-10 PROCEDURE — 74011250637 HC RX REV CODE- 250/637: Performed by: HOSPITALIST

## 2022-11-10 PROCEDURE — 82803 BLOOD GASES ANY COMBINATION: CPT

## 2022-11-10 PROCEDURE — 77030010221 HC SPLNT WR POS TELE -B: Performed by: INTERNAL MEDICINE

## 2022-11-10 PROCEDURE — 85027 COMPLETE CBC AUTOMATED: CPT

## 2022-11-10 PROCEDURE — 65620000000 HC RM CCU GENERAL

## 2022-11-10 PROCEDURE — 74011250637 HC RX REV CODE- 250/637: Performed by: INTERNAL MEDICINE

## 2022-11-10 PROCEDURE — 77030021678 HC GLIDESCP STAT DISP VERT -B: Performed by: ANESTHESIOLOGY

## 2022-11-10 PROCEDURE — 77030035236 HC SUT PDS STRATFX BARB J&J -B: Performed by: ORTHOPAEDIC SURGERY

## 2022-11-10 PROCEDURE — 36415 COLL VENOUS BLD VENIPUNCTURE: CPT

## 2022-11-10 PROCEDURE — 77030018673: Performed by: ORTHOPAEDIC SURGERY

## 2022-11-10 PROCEDURE — 77030026438 HC STYL ET INTUB CARD -A: Performed by: ANESTHESIOLOGY

## 2022-11-10 PROCEDURE — 84484 ASSAY OF TROPONIN QUANT: CPT

## 2022-11-10 PROCEDURE — C1751 CATH, INF, PER/CENT/MIDLINE: HCPCS | Performed by: ANESTHESIOLOGY

## 2022-11-10 PROCEDURE — 2709999900 HC NON-CHARGEABLE SUPPLY: Performed by: ORTHOPAEDIC SURGERY

## 2022-11-10 PROCEDURE — 77030008543 HC TBNG MON PRSS MRTM -A: Performed by: INTERNAL MEDICINE

## 2022-11-10 PROCEDURE — 77030031139 HC SUT VCRL2 J&J -A: Performed by: ORTHOPAEDIC SURGERY

## 2022-11-10 PROCEDURE — 77030008684 HC TU ET CUF COVD -B: Performed by: ANESTHESIOLOGY

## 2022-11-10 PROCEDURE — 4A023N7 MEASUREMENT OF CARDIAC SAMPLING AND PRESSURE, LEFT HEART, PERCUTANEOUS APPROACH: ICD-10-PCS | Performed by: INTERNAL MEDICINE

## 2022-11-10 PROCEDURE — 76010000161 HC OR TIME 1 TO 1.5 HR INTENSV-TIER 1: Performed by: ORTHOPAEDIC SURGERY

## 2022-11-10 PROCEDURE — 74011250636 HC RX REV CODE- 250/636: Performed by: INTERNAL MEDICINE

## 2022-11-10 PROCEDURE — C1887 CATHETER, GUIDING: HCPCS | Performed by: INTERNAL MEDICINE

## 2022-11-10 PROCEDURE — 77030019908 HC STETH ESOPH SIMS -A: Performed by: ANESTHESIOLOGY

## 2022-11-10 PROCEDURE — 77030008462 HC STPLR SKN PROX J&J -A: Performed by: ORTHOPAEDIC SURGERY

## 2022-11-10 PROCEDURE — 77030011264 HC ELECTRD BLD EXT COVD -A: Performed by: ORTHOPAEDIC SURGERY

## 2022-11-10 PROCEDURE — 81001 URINALYSIS AUTO W/SCOPE: CPT

## 2022-11-10 PROCEDURE — 74011000636 HC RX REV CODE- 636: Performed by: INTERNAL MEDICINE

## 2022-11-10 PROCEDURE — 77030006835 HC BLD SAW SAG STRY -B: Performed by: ORTHOPAEDIC SURGERY

## 2022-11-10 PROCEDURE — 74011250636 HC RX REV CODE- 250/636: Performed by: PHYSICIAN ASSISTANT

## 2022-11-10 PROCEDURE — 77030015766: Performed by: INTERNAL MEDICINE

## 2022-11-10 PROCEDURE — 77030019569 HC BND COMPR RAD TERU -B: Performed by: INTERNAL MEDICINE

## 2022-11-10 PROCEDURE — 93458 L HRT ARTERY/VENTRICLE ANGIO: CPT | Performed by: INTERNAL MEDICINE

## 2022-11-10 PROCEDURE — 93005 ELECTROCARDIOGRAM TRACING: CPT

## 2022-11-10 PROCEDURE — 77030002933 HC SUT MCRYL J&J -A: Performed by: ORTHOPAEDIC SURGERY

## 2022-11-10 PROCEDURE — 86900 BLOOD TYPING SEROLOGIC ABO: CPT

## 2022-11-10 PROCEDURE — 74011250636 HC RX REV CODE- 250/636: Performed by: ORTHOPAEDIC SURGERY

## 2022-11-10 PROCEDURE — 77030038692 HC WND DEB SYS IRMX -B: Performed by: ORTHOPAEDIC SURGERY

## 2022-11-10 RX ORDER — ROCURONIUM BROMIDE 10 MG/ML
INJECTION, SOLUTION INTRAVENOUS AS NEEDED
Status: DISCONTINUED | OUTPATIENT
Start: 2022-11-10 | End: 2022-11-10 | Stop reason: HOSPADM

## 2022-11-10 RX ORDER — PROPOFOL 10 MG/ML
INJECTION, EMULSION INTRAVENOUS AS NEEDED
Status: DISCONTINUED | OUTPATIENT
Start: 2022-11-10 | End: 2022-11-10 | Stop reason: HOSPADM

## 2022-11-10 RX ORDER — SODIUM CHLORIDE 0.9 % (FLUSH) 0.9 %
5-40 SYRINGE (ML) INJECTION AS NEEDED
Status: CANCELLED | OUTPATIENT
Start: 2022-11-10

## 2022-11-10 RX ORDER — FENTANYL CITRATE 50 UG/ML
INJECTION, SOLUTION INTRAMUSCULAR; INTRAVENOUS AS NEEDED
Status: DISCONTINUED | OUTPATIENT
Start: 2022-11-10 | End: 2022-11-10 | Stop reason: HOSPADM

## 2022-11-10 RX ORDER — CHOLECALCIFEROL (VITAMIN D3) 125 MCG
1 CAPSULE ORAL
COMMUNITY

## 2022-11-10 RX ORDER — PHENYLEPHRINE HCL IN 0.9% NACL 0.4MG/10ML
SYRINGE (ML) INTRAVENOUS AS NEEDED
Status: DISCONTINUED | OUTPATIENT
Start: 2022-11-10 | End: 2022-11-10 | Stop reason: HOSPADM

## 2022-11-10 RX ORDER — SODIUM CHLORIDE 0.9 % (FLUSH) 0.9 %
5-40 SYRINGE (ML) INJECTION EVERY 8 HOURS
Status: CANCELLED | OUTPATIENT
Start: 2022-11-10

## 2022-11-10 RX ORDER — HEPARIN SODIUM 200 [USP'U]/100ML
INJECTION, SOLUTION INTRAVENOUS
Status: COMPLETED | OUTPATIENT
Start: 2022-11-10 | End: 2022-11-10

## 2022-11-10 RX ORDER — HEPARIN SODIUM 5000 [USP'U]/ML
5000 INJECTION, SOLUTION INTRAVENOUS; SUBCUTANEOUS EVERY 8 HOURS
Status: DISCONTINUED | OUTPATIENT
Start: 2022-11-10 | End: 2022-11-13

## 2022-11-10 RX ORDER — HEPARIN SODIUM 1000 [USP'U]/ML
INJECTION, SOLUTION INTRAVENOUS; SUBCUTANEOUS AS NEEDED
Status: DISCONTINUED | OUTPATIENT
Start: 2022-11-10 | End: 2022-11-10 | Stop reason: HOSPADM

## 2022-11-10 RX ORDER — MIDAZOLAM HYDROCHLORIDE 1 MG/ML
INJECTION, SOLUTION INTRAMUSCULAR; INTRAVENOUS AS NEEDED
Status: DISCONTINUED | OUTPATIENT
Start: 2022-11-10 | End: 2022-11-10 | Stop reason: HOSPADM

## 2022-11-10 RX ORDER — LIDOCAINE HYDROCHLORIDE 20 MG/ML
INJECTION, SOLUTION EPIDURAL; INFILTRATION; INTRACAUDAL; PERINEURAL AS NEEDED
Status: DISCONTINUED | OUTPATIENT
Start: 2022-11-10 | End: 2022-11-10 | Stop reason: HOSPADM

## 2022-11-10 RX ORDER — SODIUM CHLORIDE, SODIUM LACTATE, POTASSIUM CHLORIDE, CALCIUM CHLORIDE 600; 310; 30; 20 MG/100ML; MG/100ML; MG/100ML; MG/100ML
25 INJECTION, SOLUTION INTRAVENOUS CONTINUOUS
Status: DISCONTINUED | OUTPATIENT
Start: 2022-11-10 | End: 2022-11-13 | Stop reason: HOSPADM

## 2022-11-10 RX ORDER — VERAPAMIL HYDROCHLORIDE 2.5 MG/ML
INJECTION, SOLUTION INTRAVENOUS AS NEEDED
Status: DISCONTINUED | OUTPATIENT
Start: 2022-11-10 | End: 2022-11-10 | Stop reason: HOSPADM

## 2022-11-10 RX ADMIN — Medication 120 MCG: at 16:43

## 2022-11-10 RX ADMIN — LIDOCAINE HYDROCHLORIDE 60 MG: 20 INJECTION, SOLUTION EPIDURAL; INFILTRATION; INTRACAUDAL; PERINEURAL at 16:39

## 2022-11-10 RX ADMIN — POTASSIUM CHLORIDE 10 MEQ: 750 TABLET, FILM COATED, EXTENDED RELEASE ORAL at 08:53

## 2022-11-10 RX ADMIN — MORPHINE SULFATE 2 MG: 2 INJECTION, SOLUTION INTRAMUSCULAR; INTRAVENOUS at 04:42

## 2022-11-10 RX ADMIN — FAMOTIDINE 20 MG: 10 INJECTION, SOLUTION INTRAVENOUS at 22:27

## 2022-11-10 RX ADMIN — SODIUM CHLORIDE, PRESERVATIVE FREE 10 ML: 5 INJECTION INTRAVENOUS at 07:56

## 2022-11-10 RX ADMIN — FENTANYL CITRATE 50 MCG: 50 INJECTION, SOLUTION INTRAMUSCULAR; INTRAVENOUS at 16:39

## 2022-11-10 RX ADMIN — PHENYLEPHRINE HYDROCHLORIDE 95 MCG/MIN: 10 INJECTION INTRAVENOUS at 20:59

## 2022-11-10 RX ADMIN — WATER 2 G: 1 INJECTION INTRAMUSCULAR; INTRAVENOUS; SUBCUTANEOUS at 16:35

## 2022-11-10 RX ADMIN — HEPARIN SODIUM 5000 UNITS: 5000 INJECTION INTRAVENOUS; SUBCUTANEOUS at 22:24

## 2022-11-10 RX ADMIN — Medication 80 MCG: at 16:47

## 2022-11-10 RX ADMIN — SODIUM CHLORIDE, PRESERVATIVE FREE 10 ML: 5 INJECTION INTRAVENOUS at 22:43

## 2022-11-10 RX ADMIN — ROCURONIUM BROMIDE 40 MG: 10 INJECTION INTRAVENOUS at 16:55

## 2022-11-10 RX ADMIN — SODIUM CHLORIDE 30 MCG/MIN: 900 INJECTION, SOLUTION INTRAVENOUS at 16:43

## 2022-11-10 RX ADMIN — NIACIN 1000 MG: 500 TABLET, EXTENDED RELEASE ORAL at 23:22

## 2022-11-10 RX ADMIN — SACUBITRIL AND VALSARTAN 1 TABLET: 24; 26 TABLET, FILM COATED ORAL at 10:05

## 2022-11-10 RX ADMIN — PROPOFOL 50 MCG/KG/MIN: 10 INJECTION, EMULSION INTRAVENOUS at 17:30

## 2022-11-10 RX ADMIN — Medication 1 AMPULE: at 22:39

## 2022-11-10 RX ADMIN — Medication 80 MCG: at 16:45

## 2022-11-10 RX ADMIN — Medication 80 MCG: at 16:51

## 2022-11-10 RX ADMIN — SODIUM CHLORIDE, PRESERVATIVE FREE 10 ML: 5 INJECTION INTRAVENOUS at 12:20

## 2022-11-10 RX ADMIN — FUROSEMIDE 60 MG: 40 TABLET ORAL at 08:52

## 2022-11-10 RX ADMIN — Medication 3 AMPULE: at 13:18

## 2022-11-10 RX ADMIN — FENTANYL CITRATE 50 MCG: 50 INJECTION, SOLUTION INTRAMUSCULAR; INTRAVENOUS at 16:44

## 2022-11-10 RX ADMIN — DULOXETINE 30 MG: 30 CAPSULE, DELAYED RELEASE ORAL at 08:53

## 2022-11-10 RX ADMIN — MORPHINE SULFATE 2 MG: 2 INJECTION, SOLUTION INTRAMUSCULAR; INTRAVENOUS at 12:20

## 2022-11-10 RX ADMIN — OXYCODONE 5 MG: 5 TABLET ORAL at 09:05

## 2022-11-10 RX ADMIN — VASOPRESSIN 1 UNITS: 20 INJECTION INTRAVENOUS at 16:58

## 2022-11-10 RX ADMIN — OXYCODONE 5 MG: 5 TABLET ORAL at 22:13

## 2022-11-10 RX ADMIN — AMIODARONE HYDROCHLORIDE 1 MG/MIN: 50 INJECTION, SOLUTION INTRAVENOUS at 18:58

## 2022-11-10 RX ADMIN — ACETAMINOPHEN 650 MG: 325 TABLET ORAL at 23:21

## 2022-11-10 RX ADMIN — METOPROLOL SUCCINATE 25 MG: 25 TABLET, FILM COATED, EXTENDED RELEASE ORAL at 08:52

## 2022-11-10 RX ADMIN — PROPOFOL 80 MG: 10 INJECTION, EMULSION INTRAVENOUS at 16:39

## 2022-11-10 RX ADMIN — SODIUM CHLORIDE, POTASSIUM CHLORIDE, SODIUM LACTATE AND CALCIUM CHLORIDE 25 ML/HR: 600; 310; 30; 20 INJECTION, SOLUTION INTRAVENOUS at 13:19

## 2022-11-10 NOTE — ANESTHESIA PREPROCEDURE EVALUATION
Relevant Problems   CARDIOVASCULAR   (+) Chronic systolic congestive heart failure (HCC)   (+) Essential hypertension   (+) Heart block   (+) Pacemaker       Anesthetic History   No history of anesthetic complications            Review of Systems / Medical History  Patient summary reviewed, nursing notes reviewed and pertinent labs reviewed    Pulmonary  Within defined limits                 Neuro/Psych         TIA     Cardiovascular    Hypertension      CHF  Dysrhythmias   Pacemaker (Biventricular AICD (8/14/17)), past MI (2010), CAD and cardiac stents    Exercise tolerance: <4 METS  Comments: Heart Block s/p Biventricular AICD (8/14/17)    Troponin level elevated at 317 at 1102 am on 11/10/22, up from 148 at 0530 on 11/10/22 - Patient seen by Cardiology and taken to cath lab for diagnostic cath prior to proceeding with surgery. ECG (11/10/22): Atrial sensed ventricular-paced rhythm, Biventricular pacemaker detected           GI/Hepatic/Renal               Comments: UTI (per UA of 11/29/21) - chronic per patient - patient self-catheterizes at home Endo/Other  Within defined limits           Other Findings   Comments: Right Hip Fracture           Physical Exam    Airway  Mallampati: II  TM Distance: > 6 cm  Neck ROM: normal range of motion   Mouth opening: Normal     Cardiovascular  Regular rate and rhythm,  S1 and S2 normal,  no murmur, click, rub, or gallop             Dental    Dentition: Caps/crowns and Poor dentition  Comments: Several missing teeth, none loose.    Pulmonary  Breath sounds clear to auscultation               Abdominal  GI exam deferred       Other Findings            Anesthetic Plan    ASA: 4  Anesthesia type: general    Monitoring Plan: BIS and Arterial line      Induction: Intravenous  Anesthetic plan and risks discussed with: Patient

## 2022-11-10 NOTE — PROGRESS NOTES
Hospitalist Progress Note    NAME:  Nick Palma   :  1945   MRN:  [id number]     Assessment / Plan:  Right femoral fracture  Orthopedics consulted  - elevated troponin preop, consulted Cardiology, discussed below  - surgical plan on hold     NSTEMI  Vfib s/p ICD firing x1  NSVT  Systolic congestive heart failure s/p AICD  -Continue home metoprolol, Lasix, Entresto  -consulted Cardiology, appreciate assistance  - underwent LHC 11/10/22, no significant CAD, normal LVEF. Procedure complicated by Vfib during LHC, ICD shock x1. Continued to have ectopy and NSVT  - transferred to ICU for monitoring, appreciate assistance  - amiodarone started    Hypertension  Neuropathy  Congestive heart failure s/p pacemaker  Dyslipidemia  Cymbalta  -Continue niacin     Code Status: Full code  Surrogate Decision Maker: Wife Christiana Boeck     DVT Prophylaxis: per ICU  GI Prophylaxis: not indicated     Baseline: From home, independent of ADLs    Estimated discharge date: unclear, pending ICU progress and Ortho surgery  Barriers:  ICU progress and Ortho surgery     Subjective:     Chief Complaint  R hip fracture    Subjective  Discussed with RN events overnight. Seen in the morning prior to [facility]. On my evaluation, patient with persistent R hip pain. No chest pain or SOB. Notes reproducible R flank pain. Of note, after my evaluation, patient underwent LHC for ischemic eval given rising troponins. Complicated by Vfib and NSVT, transferred to ICU. Review of Systems:  14 point ROS reviewed with patient and negative except per above. Objective:     VITALS:   Last 24hrs VS reviewed since prior progress note.  Most recent are:  Patient Vitals for the past 24 hrs:   Temp Pulse Resp BP SpO2   11/10/22 1748 -- (!) 107 12 -- 98 %   11/10/22 1743 -- 81 18 -- 97 %   11/10/22 1742 100.3 °F (37.9 °C) 81 12 (!) 157/86 97 %   11/10/22 1251 98.7 °F (37.1 °C) 81 18 118/75 93 %   11/10/22 1107 98.2 °F (36.8 °C) 78 29 120/71 93 %   11/10/22 0800 -- 83 30 (!) 141/100 94 %   11/10/22 0715 98.4 °F (36.9 °C) 80 28 (!) 144/81 95 %   11/10/22 0303 98.1 °F (36.7 °C) 70 29 126/70 95 %   11/10/22 0000 98.3 °F (36.8 °C) 86 29 131/73 96 %   11/09/22 2257 99.5 °F (37.5 °C) 88 23 123/80 95 %   11/09/22 2200 -- 83 26 123/65 94 %   11/09/22 2143 -- 86 25 (!) 131/90 (!) 88 %   11/09/22 2123 -- 90 13 128/79 95 %   11/09/22 2108 -- 81 (!) 33 135/77 95 %   11/09/22 2053 -- 89 28 122/74 91 %   11/09/22 2038 -- 85 26 125/79 90 %   11/09/22 2023 -- 92 15 122/75 91 %   11/09/22 2013 -- 91 29 -- 91 %   11/09/22 2008 -- 79 27 127/77 94 %   11/09/22 1953 -- 87 15 130/81 92 %   11/09/22 1948 -- 82 29 (!) 143/83 --   11/09/22 1938 -- 91 27 (!) 146/88 90 %       Intake/Output Summary (Last 24 hours) at 11/10/2022 1848  Last data filed at 11/10/2022 1745  Gross per 24 hour   Intake 820 ml   Output 750 ml   Net 70 ml        I had a face to face encounter and independently examined this patient on 11/10/2022, as outlined below:    PHYSICAL EXAM on my evaluation  General:   Mild distress, Answering questions appropriately  HEENT:  EOMI, Anicteric sclera. MMM  Neck:   Supple  Resp:    CTAB, non-labored, No wheezes or crackles  Cardio:  regular rate, normal rhythm, no murmurs, no JVD  GI:    Soft, Non-tender, Non-distended, Normal bowel sounds. Extremities:  R hip tender on palpation and motion, otherwise Warm, well perfused, no LE edema,   Skin:    Warm, Dry, Pink, Intact.    Neurologic:   Alert and Oriented x4, No focal defects    Reviewed most current lab test results and cultures  YES  Reviewed most current radiology test results   YES  Review and summation of old records today    NO  Reviewed patient's current orders and MAR    YES  PMH/SH reviewed - no change compared to H&P  ______________________________________________________________________    Procedures: see electronic medical records for all procedures/Xrays and details which were not copied into this note but were reviewed prior to creation of Plan. LABS:  I reviewed today's most current labs and imaging studies. Pertinent labs include:  Recent Labs     11/10/22  0530 11/09/22  1652   WBC 13.4* 10.4   HGB 12.8 14.3   HCT 39.7 43.0    210     Recent Labs     11/10/22  0530 11/09/22  1652    140   K 3.5 3.3*    104   CO2 29 31   * 130*   BUN 14 13   CREA 0.81 0.88   CA 8.5 9.7   ALB  --  3.2*   TBILI  --  0.7   ALT  --  24   INR  --  1.0       Imaging/Diagnostics:  CARDIAC PROCEDURE  Cardiac Cathetherization Note     PreOp Diagnosis:    []       Chest Pain   []       STEMI   []       Unstable Angina   [x]       NSTEMI   []       Cardiomyopathy/Heart Failure   []       Abnormal Stress Test   []       Valve Disease   []       Pre-Operative Evaluation   []       Other:      Findings/PostOp Diagnosis:   1. No sig CAD  2. Normal LVEDP     Recommendations:   1. Continue medical therapy  2. Routine post procedure & access site care   3. Continue aggressive medical management and risk factor modification     I have explained the nature of cardiac catheterization and possible   percutaneous coronary intervention including risks and benefits of the   procedure with the patient which include at least a 1:1000 risk for   diagnostic procedure and 1/100 risk for percutaneous intervention. Risks include but are not limited to risk of heart attack, stroke,   vascular trauma requiring surgical repair or transfusion, abnormal heart   rhythm requiring defibrillation or pacemaker, need for intraaortic balloon   pump support, renal dysfunction requiring dialysis, exacerbated   gastrointestinal bleeding, allergic response to medications requiring   ventilatory support, emergent cardiac surgery and even death. They also   understand the need for medical compliance - particularly if stenting is   required - mandating continued daily consumption of aspirin and plavix or   other antiplatelet therapy.  Differences between medicated stent versus   bare metal stent reviewed with patient. The patient expresses an understanding and verbally consents. They also   understand plans for either radial or femoral access - with unique risks   to both vascular beds including arterial occlusion, vascular trauma,   hematoma and need for vascular surgery. I have answered all of their   questions regarding the procedure and they are willing to proceed. Procedures: LHC, Cors, Cineflouroscopy     Indication:  As above    Procedure status: []  Elective  [] Urgent  [x] Emergent    Operators:  Lazarus Guest, DO    Assistants: None    Access:   [x]  RIGHT Radial  []  LEFT Radial  [] RCFA  []  LCFA  []  RCFV    []  LCFV    Catheters: 6Fr Miguel, EBU3    Closure: []  TR Band  [x]  N/A []  Perclose  []  Manual Compression    Tubes/Drains:  [] No tubes or drains remain from this procedure  [x]   Other: 5/6 Slender right radial    Estimated Blood Loss: Minimal     Specimens: None     Sedation: Moderate conscious sedation with IV fentany & versed, local   anesthesia with 1% lidocaine. This was performed by non-anesthesia   personnel and I provided direct supervision to a trained independent   observer. Time under moderate sedation:  25 Min    Patient age:  68 y.o. Contrast:    45 cc  [x]  Isovue   []  Visipaque    Fluoro Time:    5.7 Min    Radiation Dose:    066 mGy    Complications:  [x] None  [] Other:     Patient Condition at the end of the procedure:  [x] Stable  [] Other:      Hemodynamics: Ao: 100/70/80  LV: 100/10    Cors:     Dominance: [] Right  [x] Left  [] Mixed    LM: Large caliber vessel without significant stenosis    LAD: Large caliber vessel that wraps around the apex without significant   stenosis. Diagonals: Several small caliber vessels without significant stenosis. LCX: Large caliber vessel without significant stenosis. OM1: Moderate caliber vessel without significant stenosis.    OM2: Moderate caliber vessel without significant stenosis. PDA: Moderate caliber vessel without significant stenosis. PLB: Moderate caliber vessel without significant stenosis. RCA: Moderate caliber nondominant vessel without significant stenosis. LV angiography: N/A  EF:   Wall motion:   MR:    Andresqugenoveva Specking, DO      Care Plan discussed with:    Comments   Patient y    Family      RN y    Care Manager     Consultant                       y Multidiciplinary team rounds were held today with , nursing, pharmacist and clinical coordinator. Patient's plan of care was discussed; medications were reviewed and discharge planning was addressed.      _    Signed: Nando Davis MD

## 2022-11-10 NOTE — PROGRESS NOTES
Plan for surgery today was aborted due to sustained v-tach and hypotension upon induction of anesthesia. He had cardiac cath prior to OR today with no significant CAD but did have run of v-tach requiring shock. Plan is to ICU for observation overnight. He is being transferred to ICU intubated. Discussed plan for his hip with this wife. We agreed that observing his heart for a day or two would be best before returning to OR for his hip fracture. If he is extubated tomorrow or Saturday we will observe his vitals for 24-48 hours and plan for return to OR on Sunday morning for right JOSE.

## 2022-11-10 NOTE — PROGRESS NOTES
Patient with Hx MI with significant increase in Troponin. Awaiting cardiology clearance prior to proceeding with general anesthesia for right hip arthroplasty.

## 2022-11-10 NOTE — H&P
HPI:     Leighann Phillips is a 68 y.o. male  who presents with injury to his   right hip. He sustained a ground level fall. Had immediate pain and inability to bear weight. Brought to ED where x-rays were obtained and demonstrated right hip fracture and Orthopedics was consulted. Patient is a community ambulator without assistive device. No antecedent hip pain prior to injury. Review of Systems:  A comprehensive review of systems was negative except for that written in the HPI. Past Medical History :   Past Medical History:   Diagnosis Date    Chronic systolic congestive heart failure ([facility] [address] 8/14/2017    Essential hypertension 8/14/2017    Heart block 8/14/2017    Pacemaker 8/14/2017       Past Surgical History :   Past Surgical History:   Procedure Laterality Date    HX BLADDER REPAIR      HX CORONARY STENT PLACEMENT      HX PACEMAKER          Medications :  See med reconciliation    Allergies :   Celery, Erythromycin, Green pepper, and Statins-hmg-coa reductase inhibitors     Social History :  Social History     Socioeconomic History    Marital status:      Spouse name: Not on file    Number of children: Not on file    Years of education: Not on file    Highest education level: Not on file   Occupational History    Not on file   Tobacco Use    Smoking status: Never    Smokeless tobacco: Not on file   Substance and Sexual Activity    Alcohol use: Never    Drug use: Never    Sexual activity: Not on file   Other Topics Concern    Not on file   Social History Narrative    Not on file     Social Determinants of Health     Financial Resource Strain: Not on file   Food Insecurity: Not on file   Transportation Needs: Not on file   Physical Activity: Not on file   Stress: Not on file   Social Connections: Not on file   Intimate Partner Violence: Not on file   Housing Stability: Not on file         FAMILY HISTORY :  No family history on file.       Objective:     Vitals :  Patient Vitals for the past 12 hrs:   BP Temp Pulse Resp SpO2 Height Weight   11/09/22 2143 (!) 131/90 -- 86 25 (!) 88 % -- --   11/09/22 2123 128/79 -- 90 13 95 % -- --   11/09/22 2108 135/77 -- 81 (!) 33 95 % -- --   11/09/22 2053 122/74 -- 89 28 91 % -- --   11/09/22 2038 125/79 -- 85 26 90 % -- --   11/09/22 2023 122/75 -- 92 15 91 % -- --   11/09/22 2013 -- -- 91 29 91 % -- --   11/09/22 2008 127/77 -- 79 27 94 % -- --   11/09/22 1953 130/81 -- 87 15 92 % -- --   11/09/22 1948 (!) 143/83 -- 82 29 -- -- --   11/09/22 1938 (!) 146/88 -- 91 27 90 % -- --   11/09/22 1730 135/84 -- 73 16 -- -- --   11/09/22 1700 (!) 130/92 -- 72 15 95 % -- --   11/09/22 1456 -- -- -- -- 92 % -- --   11/09/22 1454 126/80 98.2 °F (36.8 °C) 70 16 95 % 6' 6\" (1.981 m) 106.6 kg (235 lb)       Gen: in no acute distress, alert and oriented x 3  HEENT: NCAT, PERRLA  CV: Regular rate and rhythm  RESP: Nonlaborred breathing, no use of accessory muscles  Abdomen: NT ND soft, no peritoneal signs  SKIN: Intact   Focused exam on right lower extremity  Inspection: shortened and externally rotated  Palpation: TTP about the hip. Nontender to palpation knee/leg/ankle/foot. Compartments are soft and compressible  Sensation: 2/2 SILT SPN/DPN/T/S/S nerve distributions  Motor: EHL/FHL/GSC/TA grossly intact  Unable to actively SLR secondary to pain   Vascular: Toes WWP, BCR, 2+ DP  Pain with log roll    Labs :   Recent Labs     11/09/22  1652   HGB 14.3   HCT 43.0   INR 1.0      K 3.3*      CO2 31   BUN 13   CREA 0.88   *       X-rays :   I have personally reviewed and interpreted the x-rays of the right hip which demonstrate displaced femoral neck fracture. ASSESSMENT :  68 y.o. male with right femoral neck     1. Plan for OR tomorrow for JOSE pending medical clearance  2. Therapy / Weight Bearing Recommendations following surgery : WBAT  3. DVT Prophylaxis (Following surgery) : Mechanical and asa81 mg bid  4.  Anticipated Disposition : Home vs SNF       Abdirahman Zepeda Leo Herman MD

## 2022-11-10 NOTE — PERIOP NOTES
TRANSFER - IN REPORT:    Verbal report received from [facility] on Coleen Hsieh  being received from CD33 for ordered procedure      Report consisted of patients Situation, Background, Assessment and   Recommendations(SBAR). Information from the following report(s) SBAR, ED Summary, Procedure Summary, Intake/Output, and MAR was reviewed with the receiving nurse. Opportunity for questions and clarification was provided. Assessment completed upon patients arrival to unit and care assumed.

## 2022-11-10 NOTE — PERIOP NOTES
TRANSFER - IN REPORT:    Verbal report received from [facility] on Orin Borges  being received from cath lab for routine post - op      Report consisted of patients Situation, Background, Assessment and   Recommendations(SBAR). Information from the following report(s) SBAR, Procedure Summary, Intake/Output, and MAR was reviewed with the receiving nurse. Opportunity for questions and clarification was provided. Assessment completed upon patients arrival to unit and care assumed.

## 2022-11-10 NOTE — CONSULTS
[facility] INITIAL ASSESSMENT. Name: Ovidio Beltran   : 1945   MRN: [id number]   Date: 11/10/2022        Chief Complaint   Patient presents with    Fall         HPI:     Mr. Caroline Meza is 67 yo male who is admitted after right hip fracture. He was going to get Right hip arthroplasty, underwent LHC for preop clearance due to elevated trop and had V fib in cathlab but his ICD shock him out of it. He then went for hip surgery but after intubation he continued to have ectopy and NSVT episodes. Surgery is deferred and patient is being transferred to ICU for further care and monitoring. Active Problems Being Managed:     -Recurrent non sustained Vtach.  -Right hip Fx.  -HTN. -CHF s/p BiV ICD.  -HLD. Assessment/Plan:     Recurrent non sustained V tach. -Hip surgery deferred due to dysrhythmia after intubation.  -Start amiodarone.  -Check electrolytes. -ICD interrogation.  -Cardiology following. -S/p LHC today which was unremarkable. Right hip Fx.  -Today was going to get hip surgery but canceled after intubation due to ongoing dysrhythmia. Cardiogenic vs neurovasoplegic from anesthesia. -Optimize perfusion and pressors if needed for goal MAP >65  -Closely monitor hemodynamics and markers of end organ perfusion including mental status, UO and lactate. CHF:  -Continue diuresis as tolerated. Ventilator management:  -Patient intubated for surgery.  -Continue lung protective ventilation with goal Plt pressure <30, driving pressure <69.  -Wean Fio2 for goal sats of 88-92.  -Sedation for RASS goal of 0 to -1.  -ABCDE protocol  -Daily SAT and SBT if criteria met. DVT prophylaxis: [facility]  SUP: Pepcid. Code status: Full code. Next of kin: Danny Vo (Spouse), [phone] ([place])    I personally spent 40 minutes of critical care time.   This is time spent at this critically ill patient's bedside actively involved in patient care as well as the coordination of care and discussions with the patient's family. This does not include any procedural time which has been billed separately. Review of systems:     Review of Systems   All other systems reviewed and are negative. Objective:     Vital Signs:  Visit Vitals  BP (!) 157/86   Pulse (!) 107   Temp 100.3 °F (37.9 °C)   Resp 12   Ht 6' 6\" (1.981 m)   Wt 106.6 kg (235 lb)   SpO2 98%   BMI 27.16 kg/m²    O2 Flow Rate (L/min): 3 l/min O2 Device: Nasal cannula Temp (24hrs), Av.8 °F (37.1 °C), Min:98.1 °F (36.7 °C), Max:100.3 °F (37.9 °C)           Intake/Output:     Intake/Output Summary (Last 24 hours) at 11/10/2022 1815  Last data filed at 11/10/2022 1745  Gross per 24 hour   Intake 820 ml   Output 750 ml   Net 70 ml       Physical Exam:  Physical Exam  Constitutional:       Comments: Intubated and sedated   HENT:      Head: Normocephalic and atraumatic. Mouth/Throat:      Mouth: Mucous membranes are moist.   Eyes:      Conjunctiva/sclera: Conjunctivae normal.   Cardiovascular:      Rate and Rhythm: Normal rate and regular rhythm. Abdominal:      General: There is no distension. Palpations: Abdomen is soft. Tenderness: There is no abdominal tenderness. There is no guarding. Musculoskeletal:      Cervical back: Neck supple. Neurological:      Comments: Sedated       Past Medical History:        has a past medical history of Chronic systolic congestive heart failure ([address] (2017), Essential hypertension (2017), Heart block (2017), and Pacemaker (2017). Past Surgical History:      has a past surgical history that includes hx coronary stent placement; hx pacemaker; hx bladder repair; and hx hip replacement (Left, 2021). Home Medications:     Prior to Admission medications    Medication Sig Start Date End Date Taking? Authorizing Provider   cholecalciferol, vitamin D3, 50 mcg (2,000 unit) tab Take 1 Tablet by mouth.    Yes Provider, Historical   metoprolol succinate (TOPROL-XL) 25 mg XL tablet Take 25 mg by mouth daily. Yes Provider, Historical   DULoxetine (CYMBALTA) 30 mg capsule Take 30 mg by mouth two (2) times a day. Yes Provider, Historical   sacubitril-valsartan (ENTRESTO) 49 mg/51 mg tablet Take 1 Tablet by mouth every twelve (12) hours. Yes Provider, Historical   furosemide (LASIX) 40 mg tablet Take 60 mg by mouth daily. Yes Other, MD Jessica   OTHER Please provide patient with catheters listed below: Catheter is: [address] [facility], Male, FR 14/4.7mm. Please fax to [employer] 3/1/21  Yes Andressa López MD   niacin ER (NIASPAN) 750 mg Tb24 Take 1,500 mg by mouth nightly. Provider, Historical   potassium chloride SR (KLOR-CON 10) 10 mEq tablet Take 10 mEq by mouth daily. Provider, Historical   cetirizine (ZYRTEC) 10 mg tablet Take 10 mg by mouth nightly. Patient not taking: Reported on 11/10/2022    Provider, Historical   calcium polycarbophiL (Fiber Laxative, ca polycarbo,) 625 mg tablet Take 1,250 mg by mouth four (4) times daily. Patient not taking: Reported on 11/10/2022    Provider, Historical         Allergies/Social/Family History: Allergies   Allergen Reactions    Celery Other (comments)     Mouth burns    Erythromycin Other (comments)     cramping    Green Pepper Nausea and Vomiting     Flu like S/S    Statins-Hmg-Coa Reductase Inhibitors Myalgia      Social History     Tobacco Use    Smoking status: Never    Smokeless tobacco: Not on file   Substance Use Topics    Alcohol use: Never      History reviewed. No pertinent family history. LABS AND  DATA:   Reviewed      Peak airway pressure: 17 cm H2O    Minute ventilation: 6.16 l/min      CRITICAL CARE CONSULTANT NOTE  I had a face to face encounter with the patient, reviewed and interpreted patient data including clinical events, labs, images, vital signs, I/O's, and examined patient.   I have discussed the case and the plan and management of the patient's care with the consulting services, the bedside nurses and the respiratory therapist.      NOTE OF PERSONAL INVOLVEMENT IN CARE   This patient has a high probability of imminent, clinically significant deterioration, which requires the highest level of preparedness to intervene urgently. I participated in the decision-making and personally managed or directed the management of the following life and organ supporting interventions that required my frequent assessment to treat or prevent imminent deterioration.         Sergio Abbasi MD   Pulmonary/CCM  [address]  [phone]  11/10/2022

## 2022-11-10 NOTE — ANESTHESIA PROCEDURE NOTES
Central Line Placement    Start time: 11/10/2022 5:11 PM  End time: 11/10/2022 5:24 PM  Performed by: Gatito Leavitt MD  Authorized by: Gatito Leavitt MD     Indications: need for vasopressors  Preanesthetic Checklist: patient identified, risks and benefits discussed, patient being monitored and timeout performed    Timeout Time: 17:11 EST       Pre-procedure: All elements of maximal sterile barrier technique followed?  Yes    2% Chlorhexidine for cutaneous antisepsis, Hand hygiene performed prior to catheter insertion and Ultrasound guidance    Sterile Ultrasound Technique followed?: Yes            Procedure:   Prep:  ChloraPrep  Location: internal jugular  Orientation:  Right  Patient position:  Trendelenburg  Catheter type:  Triple lumen  Catheter size:  7 Fr  Catheter length:  16 cm  Number of attempts:  1  Successful placement: Yes      Assessment:   Post-procedure:  Catheter secured and sterile dressing with CHG applied  Assessment:  Blood return through all ports, free fluid flow and guidewire removal verified  Insertion:  Uncomplicated  Patient tolerance:  Patient tolerated the procedure well with no immediate complications

## 2022-11-10 NOTE — ED NOTES
Care assumed.   Patient repositioned  Asking for pain medicine  Pain 9/10  Alert --  Pedal pulses equal and palpable--  Hematoma noted to the right upper forehead/scalp  Food tray in the room--patient did not eat anything--

## 2022-11-10 NOTE — CONSULTS
Consult    NAME: Nick Palma   :  1945   MRN:  [id number]     Date/Time:  11/10/2022 3:19 PM    Patient PCP: Refugio Mckeon MD  ________________________________________________________________________     Assessment:     NSTEMI; cath '10 with no CAD  Fall with hip fracture  HTN  Chronic systolic heart failure  Remote ICD  XOL  Follows at the [facility]: TnI 100s --> 500s    Unfortunately, troponins were checked despite the pt being free of angina and heart failure symptoms. His EKG is paced. His troponins are quite abnormal which places him at high risk for a cardiovascular complication from general anesthesia. Because of this I have recommended diagnostic cath for definitive evaluation of the patient's coronary anatomy and PCI if appropriate. All risks, benefits, and alternatives were discussed and the patient wishes to proceed. I discussed with Dr. Shankar Vargas. Remainder of care per primary/ortho    Thank you for this consult and allowing me to take part in this patients care. Please call with questions. [x]        High complexity decision making was performed        Subjective:   CHIEF COMPLAINT: Fall    HISTORY OF PRESENT ILLNESS:     Issac Torres is a 68 y.o.  male who \"presents to the ED with cc of fall and hip pain. He notes that approximately 1:30 PM he was walking across the parking lot. He notes that his right hip gave out and he fell on his right side. He was walking with a cane and was able to soften the blow. He did fall and hit his head but did not lose consciousness. He denies any head pain but he notes his hip has been hurting. He denies normally having pain in the hip. He notes his pain is worse with movement and is very achy and and somewhat spasming. Denies any neurological deficits. He notes he takes aspirin but no other blood thinners. Denies any chest pain or difficulty breathing or nausea or vomiting. Notes he was feeling well before this. Did not receive fax called again He did not syncopized or have any prodromal symptoms prior to this happening. After this happened to bypass or his help into his car he went home. His wife urged to go to the emergency department for evaluation. \"      We were asked to consult for work up and evaluation of the above problems. Past Medical History:   Diagnosis Date    Chronic systolic congestive heart failure ([facility] [address] 8/14/2017    Essential hypertension 8/14/2017    Heart block 8/14/2017    Pacemaker 8/14/2017      Past Surgical History:   Procedure Laterality Date    HX BLADDER REPAIR      HX CORONARY STENT PLACEMENT      HX HIP REPLACEMENT Left 12/2021    HX PACEMAKER      ALSO MULTI  GENERATOR CHANGES     Allergies   Allergen Reactions    Celery Other (comments)     Mouth burns    Erythromycin Other (comments)     cramping    Green Pepper Nausea and Vomiting     Flu like S/S    Statins-Hmg-Coa Reductase Inhibitors Myalgia      Meds:  See below  Social History     Tobacco Use    Smoking status: Never    Smokeless tobacco: Not on file   Substance Use Topics    Alcohol use: Never      History reviewed. No pertinent family history.     REVIEW OF SYSTEMS:     []         Unable to obtain  ROS due to ---   [x]         Total of 12 systems reviewed as follows:    Constitutional: negative fever, negative chills, negative weight loss  Eyes:   negative visual changes  ENT:   negative sore throat, tongue or lip swelling  Respiratory:  negative cough, negative dyspnea  Cards:  negative for chest pain, palpitations, lower extremity edema  GI:   negative for nausea, vomiting, diarrhea, and abdominal pain  Genitourinary: negative for frequency, dysuria  Integument:  negative for rash   Hematologic:  negative for easy bruising and gum/nose bleeding  Musculoskel: negative for myalgias,  back pain  Neurological:  negative for headaches, dizziness, vertigo, weakness  Behavl/Psych: negative for feelings of anxiety, depression     Pertinent Positives include :    Objective:      Physical Exam:    Last 24hrs VS reviewed since prior progress note. Most recent are:    Visit Vitals  /75 (BP 1 Location: Right upper arm, BP Patient Position: At rest)   Pulse 81   Temp 98.7 °F (37.1 °C)   Resp 18   Ht 6' 6\" (1.981 m)   Wt 106.6 kg (235 lb)   SpO2 93%   BMI 27.16 kg/m²       Intake/Output Summary (Last 24 hours) at 11/10/2022 1519  Last data filed at 11/10/2022 1251  Gross per 24 hour   Intake --   Output 750 ml   Net -750 ml        General Appearance: Well developed, well nourished, alert & oriented x 3,    no acute distress. Ears/Nose/Mouth/Throat: Pupils equal and round, Hearing grossly normal.  Neck: Supple. JVP within normal limits. Carotids good upstrokes, with no bruit. Chest: Lungs clear to auscultation bilaterally. Cardiovascular: Regular rate and rhythm, S1S2 normal, no murmur, rubs, gallops. Abdomen: Soft, non-tender, bowel sounds are active. No organomegaly. Extremities: No edema bilaterally. Femoral pulses +2, Distal Pulses +1. Skin: Warm and dry. Neuro: CN II-XII grossly intact, Strength and sensation grossly intact. []         Post-cath site without hematoma, bruit, tenderness, or thrill. Distal pulses intact. Data:      Telemetry:  Paced    EKG:  Paced  []  No new EKG for review. Prior to Admission medications    Medication Sig Start Date End Date Taking? Authorizing Provider   cholecalciferol, vitamin D3, 50 mcg (2,000 unit) tab Take 1 Tablet by mouth. Yes Provider, Historical   metoprolol succinate (TOPROL-XL) 25 mg XL tablet Take 25 mg by mouth daily. Yes Provider, Historical   DULoxetine (CYMBALTA) 30 mg capsule Take 30 mg by mouth two (2) times a day. Yes Provider, Historical   sacubitril-valsartan (ENTRESTO) 49 mg/51 mg tablet Take 1 Tablet by mouth every twelve (12) hours. Yes Provider, Historical   furosemide (LASIX) 40 mg tablet Take 60 mg by mouth daily.    Yes Other, MD Jessica   OTHER Please provide patient with catheters listed below: Catheter is: Min Hines, Male, FR 14/4.7mm. [facility]  Yes Don López MD   niacin ER (NIASPAN) 750 mg Tb24 Take 1,500 mg by mouth nightly. Provider, Historical   potassium chloride SR (KLOR-CON 10) 10 mEq tablet Take 10 mEq by mouth daily. Provider, Historical   cetirizine (ZYRTEC) 10 mg tablet Take 10 mg by mouth nightly. Patient not taking: Reported on 11/10/2022    Provider, Historical   calcium polycarbophiL (Fiber Laxative, ca polycarbo,) 625 mg tablet Take 1,250 mg by mouth four (4) times daily. Patient not taking: Reported on 11/10/2022    Provider, Historical       Recent Results (from the past 24 hour(s))   CBC WITH AUTOMATED DIFF    Collection Time: 11/09/22  4:52 PM   Result Value Ref Range    WBC 10.4 4.1 - 11.1 K/uL    RBC 4.32 4.10 - 5.70 M/uL    HGB 14.3 12.1 - 17.0 g/dL    HCT 43.0 36.6 - 50.3 %    MCV 99.5 (H) 80.0 - 99.0 FL    MCH 33.1 26.0 - 34.0 PG    MCHC 33.3 30.0 - 36.5 g/dL    RDW 13.2 11.5 - 14.5 %    PLATELET 148 817 - 678 K/uL    MPV 10.6 8.9 - 12.9 FL    NRBC 0.0 0  WBC    ABSOLUTE NRBC 0.00 0.00 - 0.01 K/uL    NEUTROPHILS 76 (H) 32 - 75 %    LYMPHOCYTES 13 12 - 49 %    MONOCYTES 9 5 - 13 %    EOSINOPHILS 2 0 - 7 %    BASOPHILS 0 0 - 1 %    IMMATURE GRANULOCYTES 0 0.0 - 0.5 %    ABS. NEUTROPHILS 7.8 1.8 - 8.0 K/UL    ABS. LYMPHOCYTES 1.4 0.8 - 3.5 K/UL    ABS. MONOCYTES 1.0 0.0 - 1.0 K/UL    ABS. EOSINOPHILS 0.2 0.0 - 0.4 K/UL    ABS. BASOPHILS 0.0 0.0 - 0.1 K/UL    ABS. IMM.  GRANS. 0.0 0.00 - 0.04 K/UL    DF AUTOMATED     METABOLIC PANEL, COMPREHENSIVE    Collection Time: 11/09/22  4:52 PM   Result Value Ref Range    Sodium 140 136 - 145 mmol/L    Potassium 3.3 (L) 3.5 - 5.1 mmol/L    Chloride 104 97 - 108 mmol/L    CO2 31 21 - 32 mmol/L    Anion gap 5 5 - 15 mmol/L    Glucose 130 (H) 65 - 100 mg/dL    BUN 13 6 - 20 MG/DL    Creatinine 0.88 0.70 - 1.30 MG/DL    BUN/Creatinine ratio 15 12 - 20 eGFR >60 >60 ml/min/1.73m2    Calcium 9.7 8.5 - 10.1 MG/DL    Bilirubin, total 0.7 0.2 - 1.0 MG/DL    ALT (SGPT) 24 12 - 78 U/L    AST (SGOT) 20 15 - 37 U/L    Alk.  phosphatase 114 45 - 117 U/L    Protein, total 7.3 6.4 - 8.2 g/dL    Albumin 3.2 (L) 3.5 - 5.0 g/dL    Globulin 4.1 (H) 2.0 - 4.0 g/dL    A-G Ratio 0.8 (L) 1.1 - 2.2     PROTHROMBIN TIME + INR    Collection Time: 11/09/22  4:52 PM   Result Value Ref Range    INR 1.0 0.9 - 1.1      Prothrombin time 10.3 9.0 - 11.1 sec   TYPE & SCREEN    Collection Time: 11/10/22  5:30 AM   Result Value Ref Range    Crossmatch Expiration 11/13/2022,2359     ABO/Rh(D) O POSITIVE     Antibody screen NEG    URINALYSIS W/ REFLEX CULTURE    Collection Time: 11/10/22  5:30 AM    Specimen: Urine   Result Value Ref Range    Color DARK YELLOW      Appearance CLEAR CLEAR      Specific gravity 1.023      pH (UA) 6.0 5.0 - 8.0      Protein 30 (A) NEG mg/dL    Glucose Negative NEG mg/dL    Ketone 15 (A) NEG mg/dL    Bilirubin Negative NEG      Blood Negative NEG      Urobilinogen 1.0 0.2 - 1.0 EU/dL    Nitrites Negative NEG      Leukocyte Esterase TRACE (A) NEG      WBC 0-4 0 - 4 /hpf    RBC 0-5 0 - 5 /hpf    Epithelial cells FEW FEW /lpf    Bacteria Negative NEG /hpf    UA:UC IF INDICATED CULTURE NOT INDICATED BY UA RESULT CNI      Mucus 2+ (A) NEG /lpf   METABOLIC PANEL, BASIC    Collection Time: 11/10/22  5:30 AM   Result Value Ref Range    Sodium 139 136 - 145 mmol/L    Potassium 3.5 3.5 - 5.1 mmol/L    Chloride 103 97 - 108 mmol/L    CO2 29 21 - 32 mmol/L    Anion gap 7 5 - 15 mmol/L    Glucose 121 (H) 65 - 100 mg/dL    BUN 14 6 - 20 MG/DL    Creatinine 0.81 0.70 - 1.30 MG/DL    BUN/Creatinine ratio 17 12 - 20      eGFR >60 >60 ml/min/1.73m2    Calcium 8.5 8.5 - 10.1 MG/DL   CBC W/O DIFF    Collection Time: 11/10/22  5:30 AM   Result Value Ref Range    WBC 13.4 (H) 4.1 - 11.1 K/uL    RBC 3.93 (L) 4.10 - 5.70 M/uL    HGB 12.8 12.1 - 17.0 g/dL    HCT 39.7 36.6 - 50.3 %    .0 (H) 80.0 - 99.0 FL    MCH 32.6 26.0 - 34.0 PG    MCHC 32.2 30.0 - 36.5 g/dL    RDW 13.3 11.5 - 14.5 %    PLATELET 190 757 - 316 K/uL    MPV 11.5 8.9 - 12.9 FL    NRBC 0.0 0  WBC    ABSOLUTE NRBC 0.00 0.00 - 0.01 K/uL   TROPONIN-HIGH SENSITIVITY    Collection Time: 11/10/22  5:30 AM   Result Value Ref Range    Troponin-High Sensitivity 148 (HH) 0 - 76 ng/L   EKG, 12 LEAD, INITIAL    Collection Time: 11/10/22 10:40 AM   Result Value Ref Range    Ventricular Rate 84 BPM    Atrial Rate 84 BPM    P-R Interval 156 ms    QRS Duration 140 ms    Q-T Interval 410 ms    QTC Calculation (Bezet) 484 ms    Calculated R Axis 179 degrees    Calculated T Axis 48 degrees    Diagnosis       Atrial-sensed ventricular-paced rhythm  Biventricular pacemaker detected  No previous ECGs available  Confirmed by Leyla Hernandez ([id number]) on 11/10/2022 3:13:56 PM     TROPONIN-HIGH SENSITIVITY    Collection Time: 11/10/22 11:02 AM   Result Value Ref Range    Troponin-High Sensitivity 317 (HH) 0 - 76 ng/L   NT-PRO BNP    Collection Time: 11/10/22 11:02 AM   Result Value Ref Range    NT pro-BNP 5,395 (H) <450 PG/ML        Peggy Vaughn III, DO

## 2022-11-10 NOTE — PERIOP NOTES
1251 - PT ARRIVED INTO PRE-OP HOLDING 17.  PT A&OX4, SINCLAIR SPON AND TO COMMAND. RLE WEAKLY. RESP EVEN AND UNLABORED. SHALLOW BREATHING NOTED. PT STATES DIFFICULT TO TAKE DEEP BREATHS. SECONDARY TO RIGHT SIDED RIB PAIN. BSB DIMINISHED IN BASES. POX ON 3L NC >93%  PT C/O 6/10 RIGHT HIP PAIN - REPOSITIONED FOR COMFORT. PRE-OP TCHING DONE - PT VERBALIZES UNDERSTANDING. SR UP X4, CB IN PLACE. AWAITING SURGERY.

## 2022-11-10 NOTE — PROGRESS NOTES
Brief Procedure Note    Patient: Svitlana Albert MRN: [id number]  SSN: [id number]    YOB: 1945  Age: 68 y.o. Sex: male      Date of Procedure: 11/10/2022     Pre-procedure Diagnosis: NSTEMI    Post-procedure Diagnosis: No sig CAD, nml LVEF, VF    Procedure: LHC, cors    Performed By: Ayanna Artis III, DO     Anesthesia: Moderate Sedation    Estimated Blood Loss: Less than 10 mL      Specimens:  None    Findings: as above    Complications: None    Implants: None    Recommendations: Low risk for cardiovascular complication from an otherwise low risk procedure. Proceed as clinically indicated.     Signed By: Basilio Britt DO     November 10, 2022

## 2022-11-11 LAB
ANION GAP SERPL CALC-SCNC: 7 MMOL/L (ref 5–15)
BASOPHILS # BLD: 0.1 K/UL (ref 0–0.1)
BASOPHILS NFR BLD: 0 % (ref 0–1)
BUN SERPL-MCNC: 21 MG/DL (ref 6–20)
BUN/CREAT SERPL: 20 (ref 12–20)
CALCIUM SERPL-MCNC: 8.8 MG/DL (ref 8.5–10.1)
CHLORIDE SERPL-SCNC: 101 MMOL/L (ref 97–108)
CO2 SERPL-SCNC: 29 MMOL/L (ref 21–32)
CREAT SERPL-MCNC: 1.04 MG/DL (ref 0.7–1.3)
DIFFERENTIAL METHOD BLD: ABNORMAL
EOSINOPHIL # BLD: 0 K/UL (ref 0–0.4)
EOSINOPHIL NFR BLD: 0 % (ref 0–7)
ERYTHROCYTE [DISTWIDTH] IN BLOOD BY AUTOMATED COUNT: 13 % (ref 11.5–14.5)
GLUCOSE SERPL-MCNC: 200 MG/DL (ref 65–100)
HCT VFR BLD AUTO: 38.1 % (ref 36.6–50.3)
HGB BLD-MCNC: 12.8 G/DL (ref 12.1–17)
IMM GRANULOCYTES # BLD AUTO: 0.1 K/UL (ref 0–0.04)
IMM GRANULOCYTES NFR BLD AUTO: 1 % (ref 0–0.5)
LACTATE SERPL-SCNC: 1.5 MMOL/L (ref 0.4–2)
LYMPHOCYTES # BLD: 1 K/UL (ref 0.8–3.5)
LYMPHOCYTES NFR BLD: 7 % (ref 12–49)
MAGNESIUM SERPL-MCNC: 2.2 MG/DL (ref 1.6–2.4)
MCH RBC QN AUTO: 33.7 PG (ref 26–34)
MCHC RBC AUTO-ENTMCNC: 33.6 G/DL (ref 30–36.5)
MCV RBC AUTO: 100.3 FL (ref 80–99)
MONOCYTES # BLD: 0.9 K/UL (ref 0–1)
MONOCYTES NFR BLD: 7 % (ref 5–13)
NEUTS SEG # BLD: 11.7 K/UL (ref 1.8–8)
NEUTS SEG NFR BLD: 85 % (ref 32–75)
NRBC # BLD: 0 K/UL (ref 0–0.01)
NRBC BLD-RTO: 0 PER 100 WBC
PHOSPHATE SERPL-MCNC: 3.1 MG/DL (ref 2.6–4.7)
PLATELET # BLD AUTO: 177 K/UL (ref 150–400)
PMV BLD AUTO: 10.6 FL (ref 8.9–12.9)
POTASSIUM SERPL-SCNC: 3.6 MMOL/L (ref 3.5–5.1)
RBC # BLD AUTO: 3.8 M/UL (ref 4.1–5.7)
SODIUM SERPL-SCNC: 137 MMOL/L (ref 136–145)
WBC # BLD AUTO: 13.8 K/UL (ref 4.1–11.1)

## 2022-11-11 PROCEDURE — 84100 ASSAY OF PHOSPHORUS: CPT

## 2022-11-11 PROCEDURE — 77010033678 HC OXYGEN DAILY

## 2022-11-11 PROCEDURE — 74011000258 HC RX REV CODE- 258: Performed by: HOSPITALIST

## 2022-11-11 PROCEDURE — 74011250637 HC RX REV CODE- 250/637: Performed by: INTERNAL MEDICINE

## 2022-11-11 PROCEDURE — 83735 ASSAY OF MAGNESIUM: CPT

## 2022-11-11 PROCEDURE — 74011250636 HC RX REV CODE- 250/636: Performed by: INTERNAL MEDICINE

## 2022-11-11 PROCEDURE — 85025 COMPLETE CBC W/AUTO DIFF WBC: CPT

## 2022-11-11 PROCEDURE — 74011250636 HC RX REV CODE- 250/636: Performed by: HOSPITALIST

## 2022-11-11 PROCEDURE — 80048 BASIC METABOLIC PNL TOTAL CA: CPT

## 2022-11-11 PROCEDURE — 65620000000 HC RM CCU GENERAL

## 2022-11-11 PROCEDURE — 74011250637 HC RX REV CODE- 250/637: Performed by: HOSPITALIST

## 2022-11-11 PROCEDURE — 74011000250 HC RX REV CODE- 250: Performed by: HOSPITALIST

## 2022-11-11 PROCEDURE — 74011250637 HC RX REV CODE- 250/637: Performed by: PHYSICIAN ASSISTANT

## 2022-11-11 PROCEDURE — 36415 COLL VENOUS BLD VENIPUNCTURE: CPT

## 2022-11-11 PROCEDURE — 83605 ASSAY OF LACTIC ACID: CPT

## 2022-11-11 PROCEDURE — 74011000250 HC RX REV CODE- 250: Performed by: INTERNAL MEDICINE

## 2022-11-11 RX ORDER — LIDOCAINE 4 G/100G
1 PATCH TOPICAL EVERY 24 HOURS
Status: DISCONTINUED | OUTPATIENT
Start: 2022-11-11 | End: 2022-11-17 | Stop reason: HOSPADM

## 2022-11-11 RX ADMIN — MORPHINE SULFATE 2 MG: 2 INJECTION, SOLUTION INTRAMUSCULAR; INTRAVENOUS at 20:02

## 2022-11-11 RX ADMIN — AMIODARONE HYDROCHLORIDE 0.5 MG/MIN: 50 INJECTION, SOLUTION INTRAVENOUS at 14:31

## 2022-11-11 RX ADMIN — SODIUM CHLORIDE, PRESERVATIVE FREE 10 ML: 5 INJECTION INTRAVENOUS at 06:00

## 2022-11-11 RX ADMIN — AMIODARONE HYDROCHLORIDE 1 MG/MIN: 50 INJECTION, SOLUTION INTRAVENOUS at 01:42

## 2022-11-11 RX ADMIN — SODIUM CHLORIDE, PRESERVATIVE FREE 10 ML: 5 INJECTION INTRAVENOUS at 14:00

## 2022-11-11 RX ADMIN — SACUBITRIL AND VALSARTAN 1 TABLET: 24; 26 TABLET, FILM COATED ORAL at 09:09

## 2022-11-11 RX ADMIN — HEPARIN SODIUM 5000 UNITS: 5000 INJECTION INTRAVENOUS; SUBCUTANEOUS at 10:47

## 2022-11-11 RX ADMIN — POTASSIUM CHLORIDE 10 MEQ: 750 TABLET, FILM COATED, EXTENDED RELEASE ORAL at 08:46

## 2022-11-11 RX ADMIN — SODIUM CHLORIDE, PRESERVATIVE FREE 10 ML: 5 INJECTION INTRAVENOUS at 21:08

## 2022-11-11 RX ADMIN — OXYCODONE 5 MG: 5 TABLET ORAL at 12:10

## 2022-11-11 RX ADMIN — Medication 1 AMPULE: at 08:45

## 2022-11-11 RX ADMIN — FAMOTIDINE 20 MG: 10 INJECTION, SOLUTION INTRAVENOUS at 08:46

## 2022-11-11 RX ADMIN — FAMOTIDINE 20 MG: 10 INJECTION, SOLUTION INTRAVENOUS at 20:46

## 2022-11-11 RX ADMIN — FUROSEMIDE 60 MG: 40 TABLET ORAL at 08:46

## 2022-11-11 RX ADMIN — METOPROLOL SUCCINATE 25 MG: 25 TABLET, FILM COATED, EXTENDED RELEASE ORAL at 08:47

## 2022-11-11 RX ADMIN — HEPARIN SODIUM 5000 UNITS: 5000 INJECTION INTRAVENOUS; SUBCUTANEOUS at 03:44

## 2022-11-11 RX ADMIN — HEPARIN SODIUM 5000 UNITS: 5000 INJECTION INTRAVENOUS; SUBCUTANEOUS at 19:59

## 2022-11-11 RX ADMIN — SENNOSIDES AND DOCUSATE SODIUM 2 TABLET: 50; 8.6 TABLET ORAL at 17:10

## 2022-11-11 RX ADMIN — Medication 1 AMPULE: at 20:46

## 2022-11-11 RX ADMIN — NIACIN 1000 MG: 500 TABLET, EXTENDED RELEASE ORAL at 21:07

## 2022-11-11 RX ADMIN — OXYCODONE 5 MG: 5 TABLET ORAL at 17:09

## 2022-11-11 RX ADMIN — DULOXETINE 30 MG: 30 CAPSULE, DELAYED RELEASE ORAL at 08:47

## 2022-11-11 NOTE — INTERDISCIPLINARY ROUNDS
Interdisciplinary team rounds were held 11/11/2022 with the following team members:Care Management, Nursing, Nutrition, Pharmacy, and Physician. Plan of care discussed. See clinical pathway and/or care plan for interventions and desired outcomes.

## 2022-11-11 NOTE — PROGRESS NOTES
[id number] Received report via telephone form OR nurse. Patient arrived on CCU intubated , on two pressors vasopressin and glo. Workman draining clear yellow urine. Lines include Peripheral IV, right ART line, and right IJ triple lumen. RIJ and ETT need placement confirmation per OR nurses. Stat CXR ordered and portable CXR called. Dr Yovani Negrete at bedside, labs ordered, amio drip to be started without bolus, ABG ordered -RT aware. Bedside report given to Jumana MACKENZIE. SBAR and recent results reviewed.

## 2022-11-11 NOTE — PROGRESS NOTES
Progress Note      11/11/2022 2:43 PM  NAME: Seferino Phillip   MRN:  [id number]   Admit Diagnosis: Right hip pain [M25.551]  Closed right hip fracture [facility]) [S72.001A]         Primary Cardiologist: Follows at [place]  Physician Requesting consult: Dr Lauryn Tamayo         Assessment:       NSTEMI, mild trop elevation  Fall with hip fracture   HTN  Chronic systolic heart failure  Remote ICD  Jefe Shawna at the [place]    Cath on 11/10/2022 by Dr Salvador Millan showed no CAD  VF during coronary injection, s/p Shock  (initial ICD shock failed ?)   NSVT and frequent PVCs while undergoing Hip surgery - hip surgery aborted   Low BP when induced for hip surgery          Recommendations:    Cont amiodarone, no NSVT or frequent PVCs on Amio   Keep K >4, Mg >2   Cont metoprolol   Hold Entresto for low BP   EP following, interrogation pending   Since no further arrhythmia on Amiodarone, okay for hip surgery with continuation of Amiodarone, seen by EP, awaiting for final recommendations           [x]        High complexity decision making was performed    Subjective:     HPI:    Events noted . Ihsan Benjamin Extubated last night   Doing okay       Objective:      Physical Exam:    Last 24hrs VS reviewed since prior progress note.  Most recent are:    Visit Vitals  /76   Pulse 73   Temp 98.2 °F (36.8 °C)   Resp 12   Ht 6' 6\" (1.981 m)   Wt 106.6 kg (235 lb)   SpO2 98%   BMI 27.16 kg/m²       Intake/Output Summary (Last 24 hours) at 11/11/2022 1443  Last data filed at 11/11/2022 1200  Gross per 24 hour   Intake 1764.71 ml   Output 540 ml   Net 1224.71 ml         General: Alert and oriented x3, no acute distress   Neck: Supple   Respiratory: No respiratory distress, clear lung sound   Cardiovascular: Regular rate rhythm, S1S2, no murmur   Abdomen: soft, non tender, non distended   Neuro: moves all extremities, oriented x3   Skin: warm and dry   Extremity: no edema, warm to touch      Data Review    Telemetry: Paced Rhythm      Lab Data Personally Reviewed:    Recent Labs     11/11/22  0341 11/10/22  0530   WBC 13.8* 13.4*   HGB 12.8 12.8   HCT 38.1 39.7    205     Recent Labs     11/09/22  1652   INR 1.0   PTP 10.3      Recent Labs     11/11/22  0341 11/10/22  1846 11/10/22  0530    136 139   K 3.6 3.5 3.5    102 103   CO2 29 26 29   BUN 21* 14 14   CREA 1.04 0.91 0.81   * 147* 121*   CA 8.8 8.7 8.5   MG 2.2 2.1  --      No results for input(s): CPK, CKNDX, TROIQ in the last 72 hours. No lab exists for component: CPKMB  Lab Results   Component Value Date/Time    Cholesterol, total 142 03/05/2010 02:25 PM    HDL Cholesterol 47 03/05/2010 02:25 PM    LDL, calculated 82.4 03/05/2010 02:25 PM    Triglyceride 63 03/05/2010 02:25 PM    CHOL/HDL Ratio 3.0 03/05/2010 02:25 PM       Recent Labs     11/09/22  1652      TP 7.3   ALB 3.2*   GLOB 4.1*     No results for input(s): PH, PCO2, PO2 in the last 72 hours.     Medications Personally Reviewed:    Current Facility-Administered Medications   Medication Dose Route Frequency    lidocaine 4 % patch 1 Patch  1 Patch TransDERmal Q24H    lactated Ringers infusion  25 mL/hr IntraVENous CONTINUOUS    amiodarone (CORDARONE) 375 mg/250 mL D5W infusion  0.5-1 mg/min IntraVENous TITRATE    PHENYLephrine (RONAK-SYNEPHRINE) 30 mg in 0.9% sodium chloride 250 mL infusion   mcg/min IntraVENous TITRATE    vasopressin (VASOSTRICT) 20 Units in 0.9% sodium chloride 100 mL infusion  0.03 Units/min IntraVENous CONTINUOUS    heparin (porcine) injection 5,000 Units  5,000 Units SubCUTAneous Q8H    famotidine (PF) (PEPCID) 20 mg in 0.9% sodium chloride 10 mL injection  20 mg IntraVENous Q12H    fentaNYL (PF) 1,500 mcg/30 mL (50 mcg/mL) infusion  0-200 mcg/hr IntraVENous TITRATE    alcohol 62% (NOZIN) nasal  1 Ampule  1 Ampule Topical Q12H    DULoxetine (CYMBALTA) capsule 30 mg  30 mg Oral DAILY    furosemide (LASIX) tablet 60 mg  60 mg Oral DAILY    metoprolol succinate (TOPROL-XL) XL tablet 25 mg  25 mg Oral DAILY    niacin ER (NIASPAN) tablet 1,500 mg  1,500 mg Oral QHS    potassium chloride SR (KLOR-CON 10) tablet 10 mEq  10 mEq Oral DAILY    sacubitriL-valsartan (ENTRESTO) 24-26 mg tablet 1 Tablet  1 Tablet Oral BID    sodium chloride (NS) flush 5-40 mL  5-40 mL IntraVENous Q8H    sodium chloride (NS) flush 5-40 mL  5-40 mL IntraVENous PRN    acetaminophen (TYLENOL) tablet 650 mg  650 mg Oral Q6H PRN    Or    acetaminophen (TYLENOL) suppository 650 mg  650 mg Rectal Q6H PRN    polyethylene glycol (MIRALAX) packet 17 g  17 g Oral DAILY PRN    ondansetron (ZOFRAN ODT) tablet 4 mg  4 mg Oral Q8H PRN    Or    ondansetron (ZOFRAN) injection 4 mg  4 mg IntraVENous Q6H PRN    oxyCODONE IR (ROXICODONE) tablet 5 mg  5 mg Oral Q4H PRN    morphine injection 2 mg  2 mg IntraVENous Q4H PRN    naloxone (NARCAN) injection 0.4 mg  0.4 mg IntraVENous PRN    senna-docusate (PERICOLACE) 8.6-50 mg per tablet 2 Tablet  2 Tablet Oral BID              Clair Torres MD

## 2022-11-11 NOTE — PROGRESS NOTES
1900- Bedside and Verbal shift change report given to [address], RN and AVRIL Denney (oncoming nurse) by AVRIL Graham (offgoing nurse). Report included the following information SBAR, Kardex, Intake/Output, MAR, Recent Results, and Cardiac Rhythm AV paced . 2005- Assessment complete. See flowsheet for details. On pressors. 2200- Pt extubated    0000- Reassessment complete, see flowhseet for details. 0400- Reassessment complete, see flowhseet for details. 0451- Stopped glo    0519- Vasopressin stopped    0740- Bedside and Verbal shift change report given to Quang Nuñez RN (oncoming nurse) by [address], AVRIL AND Edison, RN (offgoing nurse). Report included the following information SBAR, Kardex, Intake/Output, MAR, Recent Results, and Cardiac Rhythm AV Paced .

## 2022-11-11 NOTE — PROGRESS NOTES
0700: Report received from AVRIL Denney (offgoing nurse) to Aw[place], [facility] (oncoming nurse). 0800: Initial assessment completed. Pt A/Ox 4. On 2L NC. O2 sat: 92-97%. RIJ CVC and PIVx2. Right radial A-line with sheath, clean, dry, intact. Amiodarone drip @ 0.5 mg/min. No longer on pressors. Limited movement to Right hip. Weakness to all extremities. Abrasion to right forehead and right elbow. Workman patent and draining. Pt pain score: 2/10.    0825: Dr. Brenden Loyd (cardiology) at bedside. Will continue Amio gtt and possible switch to PO Amio tomorrow. 4864: Dr. Gutierrez Found at bedside. Agrees with Dr. Brenden Loyd with continuing Amio drip, will interrogate AICD today. 1030: Updated patient wife on current condition and plan of care. Romi Solorzano verbalized understanding of current condition and plan of care. 1105: AICD interrogation in progress. 1200: Pt reassessed. No significant changes. Pain score: 6/10. Oxycodone 5 mg PO given. 1500: Right radial A-Line removed by Floyd Newman. TR Band placed, no sign of bleeding at site. 1535: Dr. Iam Pisano at bedside. Will plan for OR this Sunday. Discussed with patient and family. 1600: Pt reassessed. No significant changes. TR band site clean, dry, and intact. 1900: Report given to AVRIL Denney (oncoming nurse) by AVRIL Ernandez (offgoing nurse).

## 2022-11-11 NOTE — PROGRESS NOTES
Patient with significant cardiac history presenting with right hip fracture for total hip arthroplasty. Noted to have significant troponin elevation prior to surgery. Cardiology had not seen patient prior to surgery and was therefore consulted for clearance. Patient was taken for diagnostic LHC, and cleared for surgery, although he had V-fib in cath lab which triggered his AICD. He was then brought to the OR for right hip arthroplasty, and denied any chest pain or SOB prior to induction of anesthesia. After induction the patient had continued ectopy, runs of V-tach, and significant hypotension requiring vasopressors. The decision was made to defer the surgery until the patient could be stabilized. A central line was placed and the patient was transferred to ICU for further care.

## 2022-11-11 NOTE — PROGRESS NOTES
Bedside and Verbal shift change report given to MAURICIO Dill and WAYNE Kaur (oncoming nurse) by MAURICIO Hough RN (offgoing nurse). Report included the following information SBAR, Kardex, Intake/Output, MAR, Recent Results, and Cardiac Rhythm AV Paced . 2030: Patient's wife calls for update. 2035: Propofol stopped. Patient following commands except with R leg (fractured hip)  and nods appropriately. 2048: Placed on CPAP for SBT by RT.    2130: Notified Dr. Lazaro Garcia (on call for Dr. Umm Shaffer) of status update. Dr. Oly Chan will see in am and interrogate pacemaker. No other orders at this time. 2200: Patinet extubated to [address] 2220: Patient calls wife to noify of extubation. Weaning pressors.

## 2022-11-11 NOTE — PROGRESS NOTES
EP/Arrhythmia Consult:  Dr. Shivam Valero called me this AM, wants me to see him regarding his VT. While injecting his LCX yesterday during heart cath, he went into VF and was externally shocked after his initial ICD shock failed (verbal report). I've contacted [employer] regarding interrogation of his BIV-ICD to review his settings and the events. He hasn't had any VT here while in the ICU. On amiodarone infusion. Off pressor currently. I'd favor continuing with amiodarone. Will need to make sure his shock output is high enough. Hopefully will get to see the mechanism of his arrhythmia. Full note to follow after review of his interrogation later.     Signed By: Christian Celestin MD     November 11, 2022

## 2022-11-11 NOTE — PROGRESS NOTES
Critical Care Progress Note  Makenzie Paniagua MD          Date of Service:  2022  NAME:  Heriberto Fletcher  :  1945  MRN:  [id number]      Subjective/Hospital course:      : Patient reports feeling better, on amiodarone gtt. Active Problems Being Managed:      -Recurrent non sustained Vtach.  -Right hip Fx.  -HTN. -CHF s/p BiV ICD.  -HLD. Assessment/Plan:      Recurrent non sustained V tach. -Hip surgery deferred due to dysrhythmia after intubation.  -Continue amiodarone.  -Monitor electrolytes. -ICD interrogation.  -Cardiology following. -S/p LHC on 11/10 was unremarkable. -EP consulted. Right hip Fx.  -He was going to get hip surgery but canceled after intubation due to ongoing dysrhythmia. Cardiogenic vs neurovasoplegic from anesthesia. -Optimize perfusion and pressors if needed for goal MAP >65  -Closely monitor hemodynamics and markers of end organ perfusion including mental status, UO and lactate. CHF:  -Continue diuresis as tolerated. Ventilator management:  -Extubated overnight.  -Pulmonary clearance. DVT prophylaxis: [facility]  SUP: Pepcid. Code status: Full code. Next of kin: August Sanders (Spouse), [phone] ([place])     I personally spent --- minutes of critical care time. This is time spent at this critically ill patient's bedside actively involved in patient care as well as the coordination of care and discussions with the patient's family. This does not include any procedural time which has been billed separately. Review of Systems:   Review of Systems   All other systems reviewed and are negative.        Vital Signs:   Patient Vitals for the past 4 hrs:   BP Temp Pulse Resp SpO2   22 1230 107/76 -- 73 12 98 %   22 1200 (!) 98/59 98.2 °F (36.8 °C) 70 18 96 %   22 1152 -- -- 70 -- --   22 1100 109/69 -- 70 24 --   22 1000 108/73 -- 70 19 97 %        Intake/Output Summary (Last 24 hours) at 11/11/2022 1342  Last data filed at 11/11/2022 1200  Gross per 24 hour   Intake 1764.71 ml   Output 540 ml   Net 1224.71 ml        Physical Examination:    Physical Exam  Constitutional:       Appearance: Normal appearance. HENT:      Head: Normocephalic and atraumatic. Mouth/Throat:      Mouth: Mucous membranes are moist.   Eyes:      Extraocular Movements: Extraocular movements intact. Conjunctiva/sclera: Conjunctivae normal.   Cardiovascular:      Rate and Rhythm: Normal rate and regular rhythm. Pulmonary:      Effort: Pulmonary effort is normal. No respiratory distress. Breath sounds: Normal breath sounds. Abdominal:      General: Abdomen is flat. There is no distension. Palpations: Abdomen is soft. Tenderness: There is no abdominal tenderness. There is no guarding. Musculoskeletal:      Cervical back: Normal range of motion and neck supple. Neurological:      Mental Status: He is alert and oriented to person, place, and time. Labs and Imaging:   Reviewed.       Medications:     Current Facility-Administered Medications   Medication Dose Route Frequency    lactated Ringers infusion  25 mL/hr IntraVENous CONTINUOUS    amiodarone (CORDARONE) 375 mg/250 mL D5W infusion  0.5-1 mg/min IntraVENous TITRATE    PHENYLephrine (RONAK-SYNEPHRINE) 30 mg in 0.9% sodium chloride 250 mL infusion   mcg/min IntraVENous TITRATE    vasopressin (VASOSTRICT) 20 Units in 0.9% sodium chloride 100 mL infusion  0.03 Units/min IntraVENous CONTINUOUS    heparin (porcine) injection 5,000 Units  5,000 Units SubCUTAneous Q8H    famotidine (PF) (PEPCID) 20 mg in 0.9% sodium chloride 10 mL injection  20 mg IntraVENous Q12H    fentaNYL (PF) 1,500 mcg/30 mL (50 mcg/mL) infusion  0-200 mcg/hr IntraVENous TITRATE    alcohol 62% (NOZIN) nasal  1 Ampule  1 Ampule Topical Q12H    DULoxetine (CYMBALTA) capsule 30 mg  30 mg Oral DAILY    furosemide (LASIX) tablet 60 mg  60 mg Oral DAILY    metoprolol succinate (TOPROL-XL) XL tablet 25 mg  25 mg Oral DAILY    niacin ER (NIASPAN) tablet 1,500 mg  1,500 mg Oral QHS    potassium chloride SR (KLOR-CON 10) tablet 10 mEq  10 mEq Oral DAILY    sacubitriL-valsartan (ENTRESTO) 24-26 mg tablet 1 Tablet  1 Tablet Oral BID    sodium chloride (NS) flush 5-40 mL  5-40 mL IntraVENous Q8H    sodium chloride (NS) flush 5-40 mL  5-40 mL IntraVENous PRN    acetaminophen (TYLENOL) tablet 650 mg  650 mg Oral Q6H PRN    Or    acetaminophen (TYLENOL) suppository 650 mg  650 mg Rectal Q6H PRN    polyethylene glycol (MIRALAX) packet 17 g  17 g Oral DAILY PRN    ondansetron (ZOFRAN ODT) tablet 4 mg  4 mg Oral Q8H PRN    Or    ondansetron (ZOFRAN) injection 4 mg  4 mg IntraVENous Q6H PRN    oxyCODONE IR (ROXICODONE) tablet 5 mg  5 mg Oral Q4H PRN    morphine injection 2 mg  2 mg IntraVENous Q4H PRN    naloxone (NARCAN) injection 0.4 mg  0.4 mg IntraVENous PRN    senna-docusate (PERICOLACE) 8.6-50 mg per tablet 2 Tablet  2 Tablet Oral BID     ______________________________________________________________________  EXPECTED LENGTH OF STAY: 2d 21h  ACTUAL LENGTH OF STAY:          Amanda Villarreal, MD   Pulmonary/CCM  [address]  [phone]  11/11/2022

## 2022-11-12 ENCOUNTER — ANESTHESIA EVENT (OUTPATIENT)
Dept: SURGERY | Age: 77
DRG: 521 | End: 2022-11-12
Payer: MEDICARE

## 2022-11-12 ENCOUNTER — APPOINTMENT (OUTPATIENT)
Dept: GENERAL RADIOLOGY | Age: 77
DRG: 521 | End: 2022-11-12
Attending: GENERAL ACUTE CARE HOSPITAL
Payer: MEDICARE

## 2022-11-12 LAB
ANION GAP SERPL CALC-SCNC: 6 MMOL/L (ref 5–15)
BASOPHILS # BLD: 0.1 K/UL (ref 0–0.1)
BASOPHILS NFR BLD: 1 % (ref 0–1)
BUN SERPL-MCNC: 25 MG/DL (ref 6–20)
BUN/CREAT SERPL: 24 (ref 12–20)
CALCIUM SERPL-MCNC: 8.7 MG/DL (ref 8.5–10.1)
CHLORIDE SERPL-SCNC: 99 MMOL/L (ref 97–108)
CO2 SERPL-SCNC: 30 MMOL/L (ref 21–32)
CREAT SERPL-MCNC: 1.03 MG/DL (ref 0.7–1.3)
DIFFERENTIAL METHOD BLD: ABNORMAL
EOSINOPHIL # BLD: 0.6 K/UL (ref 0–0.4)
EOSINOPHIL NFR BLD: 5 % (ref 0–7)
ERYTHROCYTE [DISTWIDTH] IN BLOOD BY AUTOMATED COUNT: 12.9 % (ref 11.5–14.5)
GLUCOSE SERPL-MCNC: 191 MG/DL (ref 65–100)
HCT VFR BLD AUTO: 36.3 % (ref 36.6–50.3)
HGB BLD-MCNC: 12.4 G/DL (ref 12.1–17)
IMM GRANULOCYTES # BLD AUTO: 0.1 K/UL (ref 0–0.04)
IMM GRANULOCYTES NFR BLD AUTO: 1 % (ref 0–0.5)
LYMPHOCYTES # BLD: 1.1 K/UL (ref 0.8–3.5)
LYMPHOCYTES NFR BLD: 10 % (ref 12–49)
MAGNESIUM SERPL-MCNC: 2.3 MG/DL (ref 1.6–2.4)
MCH RBC QN AUTO: 33.8 PG (ref 26–34)
MCHC RBC AUTO-ENTMCNC: 34.2 G/DL (ref 30–36.5)
MCV RBC AUTO: 98.9 FL (ref 80–99)
MONOCYTES # BLD: 1.1 K/UL (ref 0–1)
MONOCYTES NFR BLD: 10 % (ref 5–13)
NEUTS SEG # BLD: 8.8 K/UL (ref 1.8–8)
NEUTS SEG NFR BLD: 73 % (ref 32–75)
NRBC # BLD: 0 K/UL (ref 0–0.01)
NRBC BLD-RTO: 0 PER 100 WBC
PHOSPHATE SERPL-MCNC: 1.7 MG/DL (ref 2.6–4.7)
PLATELET # BLD AUTO: 170 K/UL (ref 150–400)
PMV BLD AUTO: 11.3 FL (ref 8.9–12.9)
POTASSIUM SERPL-SCNC: 3.5 MMOL/L (ref 3.5–5.1)
RBC # BLD AUTO: 3.67 M/UL (ref 4.1–5.7)
SODIUM SERPL-SCNC: 135 MMOL/L (ref 136–145)
WBC # BLD AUTO: 11.8 K/UL (ref 4.1–11.1)

## 2022-11-12 PROCEDURE — 85025 COMPLETE CBC W/AUTO DIFF WBC: CPT

## 2022-11-12 PROCEDURE — 36415 COLL VENOUS BLD VENIPUNCTURE: CPT

## 2022-11-12 PROCEDURE — 71045 X-RAY EXAM CHEST 1 VIEW: CPT

## 2022-11-12 PROCEDURE — 74011250637 HC RX REV CODE- 250/637: Performed by: INTERNAL MEDICINE

## 2022-11-12 PROCEDURE — 77010033678 HC OXYGEN DAILY

## 2022-11-12 PROCEDURE — 80048 BASIC METABOLIC PNL TOTAL CA: CPT

## 2022-11-12 PROCEDURE — 74011250637 HC RX REV CODE- 250/637: Performed by: PHYSICIAN ASSISTANT

## 2022-11-12 PROCEDURE — 74011250637 HC RX REV CODE- 250/637: Performed by: STUDENT IN AN ORGANIZED HEALTH CARE EDUCATION/TRAINING PROGRAM

## 2022-11-12 PROCEDURE — 74011000250 HC RX REV CODE- 250: Performed by: HOSPITALIST

## 2022-11-12 PROCEDURE — 74011000250 HC RX REV CODE- 250: Performed by: INTERNAL MEDICINE

## 2022-11-12 PROCEDURE — 65270000046 HC RM TELEMETRY

## 2022-11-12 PROCEDURE — 74011250636 HC RX REV CODE- 250/636: Performed by: INTERNAL MEDICINE

## 2022-11-12 PROCEDURE — 74011250637 HC RX REV CODE- 250/637: Performed by: HOSPITALIST

## 2022-11-12 PROCEDURE — 74011250636 HC RX REV CODE- 250/636: Performed by: HOSPITALIST

## 2022-11-12 PROCEDURE — 83735 ASSAY OF MAGNESIUM: CPT

## 2022-11-12 PROCEDURE — 84100 ASSAY OF PHOSPHORUS: CPT

## 2022-11-12 PROCEDURE — 74011000258 HC RX REV CODE- 258: Performed by: HOSPITALIST

## 2022-11-12 RX ORDER — CALCIUM CARBONATE 200(500)MG
200 TABLET,CHEWABLE ORAL AS NEEDED
Status: DISCONTINUED | OUTPATIENT
Start: 2022-11-12 | End: 2022-11-17 | Stop reason: HOSPADM

## 2022-11-12 RX ORDER — POTASSIUM CHLORIDE 750 MG/1
40 TABLET, FILM COATED, EXTENDED RELEASE ORAL
Status: COMPLETED | OUTPATIENT
Start: 2022-11-12 | End: 2022-11-12

## 2022-11-12 RX ADMIN — CALCIUM CARBONATE (ANTACID) CHEW TAB 500 MG 200 MG: 500 CHEW TAB at 11:58

## 2022-11-12 RX ADMIN — SENNOSIDES AND DOCUSATE SODIUM 2 TABLET: 50; 8.6 TABLET ORAL at 08:35

## 2022-11-12 RX ADMIN — HEPARIN SODIUM 5000 UNITS: 5000 INJECTION INTRAVENOUS; SUBCUTANEOUS at 02:26

## 2022-11-12 RX ADMIN — Medication 1 AMPULE: at 08:37

## 2022-11-12 RX ADMIN — AMIODARONE HYDROCHLORIDE 0.5 MG/MIN: 50 INJECTION, SOLUTION INTRAVENOUS at 03:51

## 2022-11-12 RX ADMIN — ACETAMINOPHEN 650 MG: 325 TABLET ORAL at 12:21

## 2022-11-12 RX ADMIN — SODIUM CHLORIDE, PRESERVATIVE FREE 10 ML: 5 INJECTION INTRAVENOUS at 14:04

## 2022-11-12 RX ADMIN — FAMOTIDINE 20 MG: 10 INJECTION, SOLUTION INTRAVENOUS at 08:36

## 2022-11-12 RX ADMIN — DULOXETINE 30 MG: 30 CAPSULE, DELAYED RELEASE ORAL at 08:35

## 2022-11-12 RX ADMIN — POTASSIUM CHLORIDE 10 MEQ: 750 TABLET, FILM COATED, EXTENDED RELEASE ORAL at 08:34

## 2022-11-12 RX ADMIN — NIACIN 1500 MG: 500 TABLET, EXTENDED RELEASE ORAL at 22:12

## 2022-11-12 RX ADMIN — MORPHINE SULFATE 2 MG: 2 INJECTION, SOLUTION INTRAMUSCULAR; INTRAVENOUS at 02:26

## 2022-11-12 RX ADMIN — POTASSIUM CHLORIDE 40 MEQ: 750 TABLET, FILM COATED, EXTENDED RELEASE ORAL at 10:40

## 2022-11-12 RX ADMIN — OXYCODONE 5 MG: 5 TABLET ORAL at 12:21

## 2022-11-12 RX ADMIN — OXYCODONE 5 MG: 5 TABLET ORAL at 17:17

## 2022-11-12 RX ADMIN — FUROSEMIDE 60 MG: 40 TABLET ORAL at 08:34

## 2022-11-12 RX ADMIN — OXYCODONE 5 MG: 5 TABLET ORAL at 08:44

## 2022-11-12 RX ADMIN — AMIODARONE HYDROCHLORIDE 0.5 MG/MIN: 50 INJECTION, SOLUTION INTRAVENOUS at 17:29

## 2022-11-12 RX ADMIN — ACETAMINOPHEN 650 MG: 325 TABLET ORAL at 20:38

## 2022-11-12 RX ADMIN — HEPARIN SODIUM 5000 UNITS: 5000 INJECTION INTRAVENOUS; SUBCUTANEOUS at 10:40

## 2022-11-12 RX ADMIN — FAMOTIDINE 20 MG: 10 INJECTION, SOLUTION INTRAVENOUS at 20:38

## 2022-11-12 RX ADMIN — SODIUM CHLORIDE, PRESERVATIVE FREE 10 ML: 5 INJECTION INTRAVENOUS at 05:55

## 2022-11-12 RX ADMIN — SODIUM CHLORIDE, PRESERVATIVE FREE 10 ML: 5 INJECTION INTRAVENOUS at 22:12

## 2022-11-12 RX ADMIN — SENNOSIDES AND DOCUSATE SODIUM 2 TABLET: 50; 8.6 TABLET ORAL at 17:15

## 2022-11-12 RX ADMIN — METOPROLOL SUCCINATE 25 MG: 25 TABLET, FILM COATED, EXTENDED RELEASE ORAL at 10:40

## 2022-11-12 RX ADMIN — Medication 1 AMPULE: at 20:40

## 2022-11-12 NOTE — PROGRESS NOTES
1910-Bedside and Verbal shift change report given to Marylu, RN and AVRIL Denney (oncoming nurse) by Raquel Chavez RN (offgoing nurse). Report included the following information SBAR, Kardex, Intake/Output, MAR, Recent Results, and Cardiac Rhythm AV Paced . 1950- Shift assessment complete, see flowsheet for details. Pt currently on amio at 0.5, pt states pain 7/10, repositioned for comfort. 2002- Administered 2mg Morphine IV, see pain reassessment in flowsheet. 0030- Assesmment complete, see flowsheet for details. Pt in and out of underlying Afib when tachycardic 120s     0226- Pt rated pain 7/10, 2mg morphine IV given     0400- Assessment complete, see flowsheet for details. 0715- Bedside and Verbal shift change report given to Raquel Chavez, [facility] (oncoming nurse) by Marylu, AVRIL and AVRIL Denney (offgoing nurse). Report included the following information SBAR, Kardex, Intake/Output, MAR, Recent Results, and Cardiac Rhythm AV Paced .

## 2022-11-12 NOTE — PROGRESS NOTES
Critical Care Progress Note  Zoë Robison MD          Date of Service:  2022  NAME:  Ranulfo Freeman  :  1945  MRN:  [id number]      Subjective/Hospital course:      : Patient reports feeling better, on amiodarone gtt.  - Sitting comfortably on the bed. Denies any complaints. Active Problems Being Managed:      -Recurrent non sustained Vtach.  -Right hip Fx.  -HTN. -CHF s/p BiV ICD.  -HLD. Assessment/Plan:      Recurrent non sustained V tach. -Hip surgery deferred due to dysrhythmia after intubation.  -Continue amiodarone.  -Monitor electrolytes. -Cardiology following. -S/p LHC on 11/10 was unremarkable. -EP consult appreciated. Right hip Fx.  -He was going to get hip surgery but canceled after intubation due to ongoing dysrhythmia. CHF:  -Continue diuresis as tolerated. DVT prophylaxis: [address]  SUP: Pepcid. Code status: Full code. Next of kin: Dago Arredondo (Spouse), [phone] ([place])     CCM time - 40 minutes. Review of Systems:   Review of Systems   All other systems reviewed and are negative. Vital Signs:   Patient Vitals for the past 4 hrs:   BP Temp Pulse Resp SpO2   22 0800 100/69 98.8 °F (37.1 °C) 74 18 99 %   22 0700 102/71 -- 70 21 --   22 0600 90/62 -- 70 27 --          Intake/Output Summary (Last 24 hours) at 2022 0953  Last data filed at 2022 0800  Gross per 24 hour   Intake 460 ml   Output 930 ml   Net -470 ml          Physical Examination:     GEN: Sitting comfortably on the bed, not in acute distress. HEENT: anicteric sclerae, pink conj. NECK: Supple, -LAD, no neck mass  CV: no murmurs noted. LUNGS: Coarse BS at bases, otherwise no wheezes, rhonchi, rales  ABD: soft, non-tender, no masses  EXT: No cyanosis, distal pulses palpable, no edema  SKIN: warm, dry and intact. NEURO: Alert, awake and oriented x 3. No focal deficits. Labs and Imaging:   Reviewed.       Medications: Current Facility-Administered Medications   Medication Dose Route Frequency    lidocaine 4 % patch 1 Patch  1 Patch TransDERmal Q24H    lactated Ringers infusion  25 mL/hr IntraVENous CONTINUOUS    amiodarone (CORDARONE) 375 mg/250 mL D5W infusion  0.5-1 mg/min IntraVENous TITRATE    PHENYLephrine (RONAK-SYNEPHRINE) 30 mg in 0.9% sodium chloride 250 mL infusion   mcg/min IntraVENous TITRATE    vasopressin (VASOSTRICT) 20 Units in 0.9% sodium chloride 100 mL infusion  0.03 Units/min IntraVENous CONTINUOUS    heparin (porcine) injection 5,000 Units  5,000 Units SubCUTAneous Q8H    famotidine (PF) (PEPCID) 20 mg in 0.9% sodium chloride 10 mL injection  20 mg IntraVENous Q12H    fentaNYL (PF) 1,500 mcg/30 mL (50 mcg/mL) infusion  0-200 mcg/hr IntraVENous TITRATE    alcohol 62% (NOZIN) nasal  1 Ampule  1 Ampule Topical Q12H    DULoxetine (CYMBALTA) capsule 30 mg  30 mg Oral DAILY    furosemide (LASIX) tablet 60 mg  60 mg Oral DAILY    metoprolol succinate (TOPROL-XL) XL tablet 25 mg  25 mg Oral DAILY    niacin ER (NIASPAN) tablet 1,500 mg  1,500 mg Oral QHS    potassium chloride SR (KLOR-CON 10) tablet 10 mEq  10 mEq Oral DAILY    [Held by provider] sacubitriL-valsartan (ENTRESTO) 24-26 mg tablet 1 Tablet  1 Tablet Oral BID    sodium chloride (NS) flush 5-40 mL  5-40 mL IntraVENous Q8H    sodium chloride (NS) flush 5-40 mL  5-40 mL IntraVENous PRN    acetaminophen (TYLENOL) tablet 650 mg  650 mg Oral Q6H PRN    Or    acetaminophen (TYLENOL) suppository 650 mg  650 mg Rectal Q6H PRN    polyethylene glycol (MIRALAX) packet 17 g  17 g Oral DAILY PRN    ondansetron (ZOFRAN ODT) tablet 4 mg  4 mg Oral Q8H PRN    Or    ondansetron (ZOFRAN) injection 4 mg  4 mg IntraVENous Q6H PRN    oxyCODONE IR (ROXICODONE) tablet 5 mg  5 mg Oral Q4H PRN    morphine injection 2 mg  2 mg IntraVENous Q4H PRN    naloxone (NARCAN) injection 0.4 mg  0.4 mg IntraVENous PRN    senna-docusate (PERICOLACE) 8.6-50 mg per tablet 2 Tablet  2 Tablet Oral BID     ______________________________________________________________________  EXPECTED LENGTH OF STAY: 2d 21h  ACTUAL LENGTH OF STAY:          [address] Grayson Christie MD   Pulmonary/[facility]  [address]  [phone]  11/12/2022

## 2022-11-12 NOTE — PROGRESS NOTES
0700: Report received from Kendal Gomez RN and AVRIL Denney (offgoing nurse) to [facility], [address] [place] [address] (oncoming nurse). 0800: Initial assessment completed. Pt A/Ox4. On 2L NC, O2 sat 95%. Generalized weakness, limited movement to right hip. Pulses palpable. Follows commands. RIJ CVC infusing Amiodarone @ 0.5 mg/min. Workman patent and draining. Lung sounds coarse. IS at bedside. Cardiac rhythm: AV Paced. Abrasion to Right forehead and right elbow. 0930: Dr. Manan Austin at bedside to assess patient. RN notified MD of patient's RIJ CVC triple lumen, only one lumen will flush and draw back blood. No new orders at this time. 1045: Patient's wife requesting to speak to Orthopedic surgery. RN left voicemail with [facility] with CCU and wife's call back number. 1150: ELVIN Vera (orthopedic) at bedside to discuss right hip surgery planned for tomorrow, Sunday 11/13/22 at 0800.    1200: Pt reassessed. No significant changes. 1600: Pt reassessed. No significant changes. Pt NPO at midnight for surgery in AM.    1900: Report given to Mark Granger RN (oncoming nurse) by AVRIL Ernandez (offgoing nurse).

## 2022-11-12 NOTE — PROGRESS NOTES
D/w ICU attending   Chart reviewed  H/o CAD, AICD, had a Fall with hip #  In OR went to V tach after induction  Cardio/ep did cath, was OK  EP study done, fine  On Amio drip perioperatively   Ortho: planning to take back to OR tmw    Accepted under hospitalist service   Diamond Dillon MD

## 2022-11-12 NOTE — CONSULTS
EP/ ARRHYTHMIA CONSULT requested by Dr. Tamia Oswald secondary to VT    Patient ID:  Patient: Eloy Grimes  MRN: [id number]  Age: 68 y.o.  : 1945    Date of  Admission: 2022  2:51 PM   PCP:  Rian Soria MD  Usual cardiologist:  [facility]    Assessment:   Ventricular fibrillation during injection of the left coronary system during cath 11/10. His BIV-ICD successfully sensed this and the successfully charged and then shocked this to sinus with 31J energy. He had early recurrence of VF which may have been triggered by PVC early after BIV pacing post-shock which led to prompt external shock. There was no internal shock failure. [address] BIV-ICD upgrade to his prior pacemaker. BIV pacing. No significant CAD on cath. While he has a faintly elevated troponin that trended upward, there is no ECG, imaging, or symptom evidence of ischemia. Normal EF. Fall with R hip fracture. Full code. Plan:     Regarding the initial VF during cath, this may have been precipitated by quick contrast-induced changes, decreased oxygenation in the microvasculature during injection, pressure stretch of myocardium with injection, transient QT dispersion, etc.  In any count, this has been described before in certain patients. It was NOT due to R-on-T mechanism or device-triggered AT FIRST. The second episode of recurrent VF may have been R-on-T related phenomenon after review of the strips as described above. He was rescued by external defibrillation for the second episode after a few seconds but likely would have done by internal if had not subsided on its own. Regarding the VT in the OR, I don't have strips to review on that. I'd keep on IV amiodarone, would not convert to oral until postop. I think from a rhythm and cardiac performance standpoint, he should be able to tolerate surgery as early as tomorrow. Place a magnet atop his device if electrocautery is anticipated.   No changes to his BIV-ICD programming are needed, I took photos of the interrogation today to be available in the media section. He has a good system in place, functioning adequately. He should be able to tolerate general anesthesia without a major pressor requirement with his normal EF. Agree with hold his ARB element Calvin Brooke) in case he has the potential to tank with induction. If he stays on amiodarone at least for the next month postop, I'd do outpatient DFT testing since amio can raise the defibrillation threshold in some patients. Dr. Jesse Alvarez will be seeing him this weekend. [x]       High complexity decision making was performed in this patient at high risk for decompensation with multiple organ involvement in the ICU. Jessica Gilliam is a 68 y.o. male with a history of VF during coronary injection of the L system. He was externally shocked and I was told that the internal shock failed. Turns out the internal shock was good, but he had recurrent VF so fast, an external shock was delivered. After his cath, proceeded to surgery. During this, he had \"frequent VT\". R hip surgery was aborted and sent to the ICU on pressor. He was started on amiodarone infusion and has done fine. He denies syncope, dizziness, or palpitations. No orthopnea, PND, or edema. No TIA or stroke symptoms. I was asked to see him by Dr. Damion Swanson this AM due to VT and VF. Currently he has had a smooth rhythm with consistent BIV pacing.       Past Medical History:   Diagnosis Date    Chronic systolic congestive heart failure ([address] 8/14/2017    Essential hypertension 8/14/2017    Heart block 8/14/2017    Pacemaker 8/14/2017        Past Surgical History:   Procedure Laterality Date    HX BLADDER REPAIR      HX CORONARY STENT PLACEMENT      HX HIP REPLACEMENT Left 12/2021    HX PACEMAKER      ALSO MULTI  GENERATOR CHANGES       Social History     Tobacco Use    Smoking status: Never    Smokeless tobacco: Not on file   Substance Use Topics    Alcohol use: Never        History reviewed. No pertinent family history.      Allergies   Allergen Reactions    Celery Other (comments)     Mouth burns    Erythromycin Other (comments)     cramping    Green Pepper Nausea and Vomiting     Flu like S/S    Statins-Hmg-Coa Reductase Inhibitors Myalgia          Current Facility-Administered Medications   Medication Dose Route Frequency    lidocaine 4 % patch 1 Patch  1 Patch TransDERmal Q24H    lactated Ringers infusion  25 mL/hr IntraVENous CONTINUOUS    amiodarone (CORDARONE) 375 mg/250 mL D5W infusion  0.5-1 mg/min IntraVENous TITRATE    PHENYLephrine (RONAK-SYNEPHRINE) 30 mg in 0.9% sodium chloride 250 mL infusion   mcg/min IntraVENous TITRATE    vasopressin (VASOSTRICT) 20 Units in 0.9% sodium chloride 100 mL infusion  0.03 Units/min IntraVENous CONTINUOUS    heparin (porcine) injection 5,000 Units  5,000 Units SubCUTAneous Q8H    famotidine (PF) (PEPCID) 20 mg in 0.9% sodium chloride 10 mL injection  20 mg IntraVENous Q12H    fentaNYL (PF) 1,500 mcg/30 mL (50 mcg/mL) infusion  0-200 mcg/hr IntraVENous TITRATE    alcohol 62% (NOZIN) nasal  1 Ampule  1 Ampule Topical Q12H    DULoxetine (CYMBALTA) capsule 30 mg  30 mg Oral DAILY    furosemide (LASIX) tablet 60 mg  60 mg Oral DAILY    metoprolol succinate (TOPROL-XL) XL tablet 25 mg  25 mg Oral DAILY    niacin ER (NIASPAN) tablet 1,500 mg  1,500 mg Oral QHS    potassium chloride SR (KLOR-CON 10) tablet 10 mEq  10 mEq Oral DAILY    [Held by provider] sacubitriL-valsartan (ENTRESTO) 24-26 mg tablet 1 Tablet  1 Tablet Oral BID    sodium chloride (NS) flush 5-40 mL  5-40 mL IntraVENous Q8H    sodium chloride (NS) flush 5-40 mL  5-40 mL IntraVENous PRN    acetaminophen (TYLENOL) tablet 650 mg  650 mg Oral Q6H PRN    Or    acetaminophen (TYLENOL) suppository 650 mg  650 mg Rectal Q6H PRN    polyethylene glycol (MIRALAX) packet 17 g  17 g Oral DAILY PRN    ondansetron (ZOFRAN ODT) tablet 4 mg  4 mg Oral Q8H PRN    Or    ondansetron (ZOFRAN) injection 4 mg  4 mg IntraVENous Q6H PRN    oxyCODONE IR (ROXICODONE) tablet 5 mg  5 mg Oral Q4H PRN    morphine injection 2 mg  2 mg IntraVENous Q4H PRN    naloxone (NARCAN) injection 0.4 mg  0.4 mg IntraVENous PRN    senna-docusate (PERICOLACE) 8.6-50 mg per tablet 2 Tablet  2 Tablet Oral BID       Review of Symptoms:  See HPI as well.   General: negative for fever, chills, sweats, weakness, weight loss   Eyes: negative for blurred vision, eye pain, loss of vision, diplopia   Ear Nose and Throat: negative for rhinorrhea, pharyngitis, otalgia, tinnitus, speech or swallowing difficulties   Respiratory: negative for SOB, cough, sputum production, wheezing, SOLARES, pleuritic pain   Cardiology: negative for chest pain, palpitations, orthopnea, PND, edema, syncope   Gastrointestinal: negative for abdominal pain, N/V, dysphagia, change in bowel habits, bleeding   Genitourinary: negative for frequency, urgency, dysuria, hematuria, incontinence   Muskuloskeletal : negative for arthralgia, myalgia   Hematology: negative for easy bruising, bleeding, lymphadenopathy   Dermatological: negative for rash, ulceration, mole change, new lesion   Endocrine: negative for hot flashes or polydipsia   Neurological: negative for headache, dizziness, confusion, focal weakness, paresthesia, memory loss, gait disturbance   Psychological: negative for anxiety, depression, agitation       Objective:      Physical Exam:  Temp (24hrs), Av.7 °F (37.1 °C), Min:97.5 °F (36.4 °C), Max:101.3 °F (38.5 °C)    Patient Vitals for the past 8 hrs:   Pulse   22 1800 72   22 1700 70   22 1600 70   22 1500 70   22 1400 73   22 1300 71   22 1230 73    Patient Vitals for the past 8 hrs:   Resp   22 1800 17   22 1700 21   22 1600 23   22 1500 16   22 1400 17   22 1300 16   22 1230 12    Patient Vitals for the past 8 hrs:   BP   22 1800 110/72   11/11/22 1700 107/72   11/11/22 1600 106/74   11/11/22 1500 100/69   11/11/22 1400 92/67   11/11/22 1300 100/70   11/11/22 1230 107/76        Intake/Output Summary (Last 24 hours) at 11/11/2022 2010  Last data filed at 11/11/2022 1800  Gross per 24 hour   Intake 967.13 ml   Output 975 ml   Net -7.87 ml       Nondiaphoretic, not in acute distress. Supple, no palpable thyromegaly. No scleral icterus, mucous membranes moist, conjuctivae pink, no xanthelasma. Unlabored, clear to auscultation bilaterally, symmetric air movement. Regular rate and rhythm, no murmur, pericardial rub, knock, or gallop. No JVD or peripheral edema. No carotid bruit. Palpable radial and DP/PT pulses bilaterally. Abdomen, soft, nontender, nondistended. No abdominal bruit or pulstatile masses. Extremities without cyanosis or clubbing. Muscle tone and bulk normal.  Skin warm and dry. No rashes or ulcers. Neuro grossly nonfocal.  No tremor. Awake and appropriate. CARDIOGRAPHICS and STUDIES, I reviewed:    Telemetry:  Reviewed. ECG:  Reviewed. Echo:  Reviewed. CXR:  Reviewed. Labs:  No results for input(s): CPK, CKMB, CKNDX, TROIQ in the last 72 hours.     No lab exists for component: CPKMB  Lab Results   Component Value Date/Time    Cholesterol, total 142 03/05/2010 02:25 PM    HDL Cholesterol 47 03/05/2010 02:25 PM    LDL, calculated 82.4 03/05/2010 02:25 PM    Triglyceride 63 03/05/2010 02:25 PM    CHOL/HDL Ratio 3.0 03/05/2010 02:25 PM     Recent Labs     11/09/22  1652   INR 1.0   PTP 10.3      Recent Labs     11/11/22  0341 11/10/22  1846 11/10/22  0530 11/09/22  1652    136 139 140   K 3.6 3.5 3.5 3.3*    102 103 104   CO2 29 26 29 31   BUN 21* 14 14 13   CREA 1.04 0.91 0.81 0.88   * 147* 121* 130*   PHOS 3.1  --   --   --    CA 8.8 8.7 8.5 9.7   ALB  --   --   --  3.2*   WBC 13.8*  --  13.4* 10.4   HGB 12.8  --  12.8 14.3   HCT 38.1  --  39.7 43.0     --  205 210     Recent Labs     11/09/22  1652      TP 7.3   ALB 3.2*   GLOB 4.1*     No components found for: GLPOC  No results for input(s): PH, PCO2, PO2 in the last 72 hours.         Pieter Jara MD  11/11/2022

## 2022-11-12 NOTE — PROGRESS NOTES
Progress Note      11/12/2022 2:43 PM  NAME: Heriberto Fletcher   MRN:  [id number]   Admit Diagnosis: Right hip pain [M25.551]  Closed right hip fracture [facility]) [S72.001A]     Primary Cardiologist: Follows at [facility]  Physician Requesting consult: Dr Sarah Bailey:       NSTEMI, mild trop elevation  Fall with hip fracture   HTN  Chronic systolic heart failure  Remote ICD  Gabriel Lindsey at the [facility]    Cath on 11/10/2022 by Dr Figueroa Triplett showed no CAD  VF during coronary injection, s/p Shock  (initial ICD shock failed ?)   NSVT and frequent PVCs while undergoing Hip surgery - hip surgery aborted   Low BP when induced for hip surgery          Recommendations:    Cont amiodarone   Keep K >4, Mg >2   Cont metoprolol   Hold Entresto for low BP   OKAY to undergo Hip surgery, will continue amiodarone gtt perioperatively. Then transition to PO . Plan for surgery tomorrow. [x]        High complexity decision making was performed    Subjective:     HPI:  Had episode of atrial tach with PM tracking   No NSVT. Objective:      Physical Exam:    Last 24hrs VS reviewed since prior progress note.  Most recent are:    Visit Vitals  /69 (BP 1 Location: Left upper arm, BP Patient Position: At rest)   Pulse 74   Temp 98.8 °F (37.1 °C)   Resp 18   Ht 6' 6\" (1.981 m)   Wt 111 kg (244 lb 11.4 oz)   SpO2 99%   BMI 28.28 kg/m²       Intake/Output Summary (Last 24 hours) at 11/12/2022 0957  Last data filed at 11/12/2022 0800  Gross per 24 hour   Intake 460 ml   Output 930 ml   Net -470 ml         General: Alert and oriented x3, no acute distress   Neck: Supple   Respiratory: No respiratory distress, clear lung sound   Cardiovascular: Regular rate rhythm, S1S2, no murmur   Abdomen: soft, non tender, non distended   Neuro: moves all extremities, oriented x3   Skin: warm and dry   Extremity: no edema, warm to touch      Data Review    Telemetry: Paced Rhythm      Lab Data Personally Reviewed:    Recent Labs 11/12/22  0440 11/11/22  0341   WBC 11.8* 13.8*   HGB 12.4 12.8   HCT 36.3* 38.1    177     Recent Labs     11/09/22  1652   INR 1.0   PTP 10.3      Recent Labs     11/12/22  0440 11/11/22  0341 11/10/22  1846   * 137 136   K 3.5 3.6 3.5   CL 99 101 102   CO2 30 29 26   BUN 25* 21* 14   CREA 1.03 1.04 0.91   * 200* 147*   CA 8.7 8.8 8.7   MG 2.3 2.2 2.1     No results for input(s): CPK, CKNDX, TROIQ in the last 72 hours. No lab exists for component: CPKMB  Lab Results   Component Value Date/Time    Cholesterol, total 142 03/05/2010 02:25 PM    HDL Cholesterol 47 03/05/2010 02:25 PM    LDL, calculated 82.4 03/05/2010 02:25 PM    Triglyceride 63 03/05/2010 02:25 PM    CHOL/HDL Ratio 3.0 03/05/2010 02:25 PM       Recent Labs     11/09/22  1652      TP 7.3   ALB 3.2*   GLOB 4.1*     No results for input(s): PH, PCO2, PO2 in the last 72 hours.     Medications Personally Reviewed:    Current Facility-Administered Medications   Medication Dose Route Frequency    lidocaine 4 % patch 1 Patch  1 Patch TransDERmal Q24H    lactated Ringers infusion  25 mL/hr IntraVENous CONTINUOUS    amiodarone (CORDARONE) 375 mg/250 mL D5W infusion  0.5-1 mg/min IntraVENous TITRATE    PHENYLephrine (RONAK-SYNEPHRINE) 30 mg in 0.9% sodium chloride 250 mL infusion   mcg/min IntraVENous TITRATE    vasopressin (VASOSTRICT) 20 Units in 0.9% sodium chloride 100 mL infusion  0.03 Units/min IntraVENous CONTINUOUS    heparin (porcine) injection 5,000 Units  5,000 Units SubCUTAneous Q8H    famotidine (PF) (PEPCID) 20 mg in 0.9% sodium chloride 10 mL injection  20 mg IntraVENous Q12H    fentaNYL (PF) 1,500 mcg/30 mL (50 mcg/mL) infusion  0-200 mcg/hr IntraVENous TITRATE    alcohol 62% (NOZIN) nasal  1 Ampule  1 Ampule Topical Q12H    DULoxetine (CYMBALTA) capsule 30 mg  30 mg Oral DAILY    furosemide (LASIX) tablet 60 mg  60 mg Oral DAILY    metoprolol succinate (TOPROL-XL) XL tablet 25 mg  25 mg Oral DAILY niacin ER (NIASPAN) tablet 1,500 mg  1,500 mg Oral QHS    potassium chloride SR (KLOR-CON 10) tablet 10 mEq  10 mEq Oral DAILY    [Held by provider] sacubitriL-valsartan (ENTRESTO) 24-26 mg tablet 1 Tablet  1 Tablet Oral BID    sodium chloride (NS) flush 5-40 mL  5-40 mL IntraVENous Q8H    sodium chloride (NS) flush 5-40 mL  5-40 mL IntraVENous PRN    acetaminophen (TYLENOL) tablet 650 mg  650 mg Oral Q6H PRN    Or    acetaminophen (TYLENOL) suppository 650 mg  650 mg Rectal Q6H PRN    polyethylene glycol (MIRALAX) packet 17 g  17 g Oral DAILY PRN    ondansetron (ZOFRAN ODT) tablet 4 mg  4 mg Oral Q8H PRN    Or    ondansetron (ZOFRAN) injection 4 mg  4 mg IntraVENous Q6H PRN    oxyCODONE IR (ROXICODONE) tablet 5 mg  5 mg Oral Q4H PRN    morphine injection 2 mg  2 mg IntraVENous Q4H PRN    naloxone (NARCAN) injection 0.4 mg  0.4 mg IntraVENous PRN    senna-docusate (PERICOLACE) 8.6-50 mg per tablet 2 Tablet  2 Tablet Oral BID              Luke uRth MD

## 2022-11-13 ENCOUNTER — APPOINTMENT (OUTPATIENT)
Dept: GENERAL RADIOLOGY | Age: 77
DRG: 521 | End: 2022-11-13
Attending: ORTHOPAEDIC SURGERY
Payer: MEDICARE

## 2022-11-13 ENCOUNTER — ANESTHESIA (OUTPATIENT)
Dept: SURGERY | Age: 77
DRG: 521 | End: 2022-11-13
Payer: MEDICARE

## 2022-11-13 LAB
ABO + RH BLD: NORMAL
ABO + RH BLD: NORMAL
ALBUMIN SERPL-MCNC: 2.3 G/DL (ref 3.5–5)
ALBUMIN/GLOB SERPL: 0.5 {RATIO} (ref 1.1–2.2)
ALP SERPL-CCNC: 103 U/L (ref 45–117)
ALT SERPL-CCNC: 25 U/L (ref 12–78)
ANION GAP SERPL CALC-SCNC: 4 MMOL/L (ref 5–15)
AST SERPL-CCNC: 35 U/L (ref 15–37)
BASOPHILS # BLD: 0.1 K/UL (ref 0–0.1)
BASOPHILS NFR BLD: 1 % (ref 0–1)
BILIRUB SERPL-MCNC: 0.9 MG/DL (ref 0.2–1)
BLOOD GROUP ANTIBODIES SERPL: NORMAL
BLOOD GROUP ANTIBODIES SERPL: NORMAL
BUN SERPL-MCNC: 24 MG/DL (ref 6–20)
BUN/CREAT SERPL: 23 (ref 12–20)
CALCIUM SERPL-MCNC: 9.5 MG/DL (ref 8.5–10.1)
CHLORIDE SERPL-SCNC: 99 MMOL/L (ref 97–108)
CO2 SERPL-SCNC: 30 MMOL/L (ref 21–32)
CREAT SERPL-MCNC: 1.06 MG/DL (ref 0.7–1.3)
DIFFERENTIAL METHOD BLD: ABNORMAL
EOSINOPHIL # BLD: 0.9 K/UL (ref 0–0.4)
EOSINOPHIL NFR BLD: 8 % (ref 0–7)
ERYTHROCYTE [DISTWIDTH] IN BLOOD BY AUTOMATED COUNT: 12.9 % (ref 11.5–14.5)
GLOBULIN SER CALC-MCNC: 4.5 G/DL (ref 2–4)
GLUCOSE SERPL-MCNC: 173 MG/DL (ref 65–100)
HCT VFR BLD AUTO: 40.7 % (ref 36.6–50.3)
HGB BLD-MCNC: 13.5 G/DL (ref 12.1–17)
IMM GRANULOCYTES # BLD AUTO: 0.1 K/UL (ref 0–0.04)
IMM GRANULOCYTES NFR BLD AUTO: 0 % (ref 0–0.5)
LYMPHOCYTES # BLD: 1.3 K/UL (ref 0.8–3.5)
LYMPHOCYTES NFR BLD: 11 % (ref 12–49)
MCH RBC QN AUTO: 33.8 PG (ref 26–34)
MCHC RBC AUTO-ENTMCNC: 33.2 G/DL (ref 30–36.5)
MCV RBC AUTO: 101.8 FL (ref 80–99)
MONOCYTES # BLD: 1.2 K/UL (ref 0–1)
MONOCYTES NFR BLD: 10 % (ref 5–13)
NEUTS SEG # BLD: 8.6 K/UL (ref 1.8–8)
NEUTS SEG NFR BLD: 70 % (ref 32–75)
NRBC # BLD: 0 K/UL (ref 0–0.01)
NRBC BLD-RTO: 0 PER 100 WBC
PHOSPHATE SERPL-MCNC: 2.4 MG/DL (ref 2.6–4.7)
PLATELET # BLD AUTO: 195 K/UL (ref 150–400)
PMV BLD AUTO: 11.5 FL (ref 8.9–12.9)
POTASSIUM SERPL-SCNC: 4.3 MMOL/L (ref 3.5–5.1)
PROT SERPL-MCNC: 6.8 G/DL (ref 6.4–8.2)
RBC # BLD AUTO: 4 M/UL (ref 4.1–5.7)
SODIUM SERPL-SCNC: 133 MMOL/L (ref 136–145)
SPECIMEN EXP DATE BLD: NORMAL
SPECIMEN EXP DATE BLD: NORMAL
WBC # BLD AUTO: 12 K/UL (ref 4.1–11.1)

## 2022-11-13 PROCEDURE — 77030008462 HC STPLR SKN PROX J&J -A: Performed by: ORTHOPAEDIC SURGERY

## 2022-11-13 PROCEDURE — 74011250637 HC RX REV CODE- 250/637: Performed by: HOSPITALIST

## 2022-11-13 PROCEDURE — 77030006835 HC BLD SAW SAG STRY -B: Performed by: ORTHOPAEDIC SURGERY

## 2022-11-13 PROCEDURE — 77030018729 HC ELECTRD DEFIB PAD CARD -B

## 2022-11-13 PROCEDURE — 74011250636 HC RX REV CODE- 250/636: Performed by: NURSE ANESTHETIST, CERTIFIED REGISTERED

## 2022-11-13 PROCEDURE — 74011250636 HC RX REV CODE- 250/636: Performed by: ORTHOPAEDIC SURGERY

## 2022-11-13 PROCEDURE — 74011000250 HC RX REV CODE- 250: Performed by: HOSPITALIST

## 2022-11-13 PROCEDURE — 74011000250 HC RX REV CODE- 250: Performed by: NURSE ANESTHETIST, CERTIFIED REGISTERED

## 2022-11-13 PROCEDURE — 74011250637 HC RX REV CODE- 250/637: Performed by: ORTHOPAEDIC SURGERY

## 2022-11-13 PROCEDURE — 77030018673: Performed by: ORTHOPAEDIC SURGERY

## 2022-11-13 PROCEDURE — 77030034479 HC ADH SKN CLSR PRINEO J&J -B: Performed by: ORTHOPAEDIC SURGERY

## 2022-11-13 PROCEDURE — 36415 COLL VENOUS BLD VENIPUNCTURE: CPT

## 2022-11-13 PROCEDURE — 80053 COMPREHEN METABOLIC PANEL: CPT

## 2022-11-13 PROCEDURE — 77030041279 HC DRSG PRMSL AG MDII -B: Performed by: ORTHOPAEDIC SURGERY

## 2022-11-13 PROCEDURE — 77030013079 HC BLNKT BAIR HGGR 3M -A: Performed by: STUDENT IN AN ORGANIZED HEALTH CARE EDUCATION/TRAINING PROGRAM

## 2022-11-13 PROCEDURE — 86900 BLOOD TYPING SEROLOGIC ABO: CPT

## 2022-11-13 PROCEDURE — 85025 COMPLETE CBC W/AUTO DIFF WBC: CPT

## 2022-11-13 PROCEDURE — 74011000258 HC RX REV CODE- 258: Performed by: NURSE ANESTHETIST, CERTIFIED REGISTERED

## 2022-11-13 PROCEDURE — 2709999900 HC NON-CHARGEABLE SUPPLY: Performed by: ORTHOPAEDIC SURGERY

## 2022-11-13 PROCEDURE — 74011250636 HC RX REV CODE- 250/636: Performed by: HOSPITALIST

## 2022-11-13 PROCEDURE — 0SR90JZ REPLACEMENT OF RIGHT HIP JOINT WITH SYNTHETIC SUBSTITUTE, OPEN APPROACH: ICD-10-PCS | Performed by: ORTHOPAEDIC SURGERY

## 2022-11-13 PROCEDURE — 74011000250 HC RX REV CODE- 250: Performed by: ORTHOPAEDIC SURGERY

## 2022-11-13 PROCEDURE — 77030014125 HC TY EPDRL BBMI -B: Performed by: STUDENT IN AN ORGANIZED HEALTH CARE EDUCATION/TRAINING PROGRAM

## 2022-11-13 PROCEDURE — 77010033678 HC OXYGEN DAILY

## 2022-11-13 PROCEDURE — 77030002933 HC SUT MCRYL J&J -A: Performed by: ORTHOPAEDIC SURGERY

## 2022-11-13 PROCEDURE — 76010000171 HC OR TIME 2 TO 2.5 HR INTENSV-TIER 1: Performed by: ORTHOPAEDIC SURGERY

## 2022-11-13 PROCEDURE — 77030005401 HC CATH RAD ARRO -A: Performed by: STUDENT IN AN ORGANIZED HEALTH CARE EDUCATION/TRAINING PROGRAM

## 2022-11-13 PROCEDURE — 74011000250 HC RX REV CODE- 250: Performed by: INTERNAL MEDICINE

## 2022-11-13 PROCEDURE — 77030021678 HC GLIDESCP STAT DISP VERT -B: Performed by: STUDENT IN AN ORGANIZED HEALTH CARE EDUCATION/TRAINING PROGRAM

## 2022-11-13 PROCEDURE — 74011000258 HC RX REV CODE- 258: Performed by: STUDENT IN AN ORGANIZED HEALTH CARE EDUCATION/TRAINING PROGRAM

## 2022-11-13 PROCEDURE — 76000 FLUOROSCOPY <1 HR PHYS/QHP: CPT

## 2022-11-13 PROCEDURE — 74011250637 HC RX REV CODE- 250/637: Performed by: PHYSICIAN ASSISTANT

## 2022-11-13 PROCEDURE — 77030026438 HC STYL ET INTUB CARD -A: Performed by: STUDENT IN AN ORGANIZED HEALTH CARE EDUCATION/TRAINING PROGRAM

## 2022-11-13 PROCEDURE — 74011000258 HC RX REV CODE- 258: Performed by: HOSPITALIST

## 2022-11-13 PROCEDURE — 74011000250 HC RX REV CODE- 250: Performed by: STUDENT IN AN ORGANIZED HEALTH CARE EDUCATION/TRAINING PROGRAM

## 2022-11-13 PROCEDURE — P9045 ALBUMIN (HUMAN), 5%, 250 ML: HCPCS | Performed by: NURSE ANESTHETIST, CERTIFIED REGISTERED

## 2022-11-13 PROCEDURE — 73501 X-RAY EXAM HIP UNI 1 VIEW: CPT

## 2022-11-13 PROCEDURE — 76060000035 HC ANESTHESIA 2 TO 2.5 HR: Performed by: ORTHOPAEDIC SURGERY

## 2022-11-13 PROCEDURE — 77030008684 HC TU ET CUF COVD -B: Performed by: STUDENT IN AN ORGANIZED HEALTH CARE EDUCATION/TRAINING PROGRAM

## 2022-11-13 PROCEDURE — 74011250637 HC RX REV CODE- 250/637: Performed by: INTERNAL MEDICINE

## 2022-11-13 PROCEDURE — 77030036660

## 2022-11-13 PROCEDURE — 76210000006 HC OR PH I REC 0.5 TO 1 HR: Performed by: ORTHOPAEDIC SURGERY

## 2022-11-13 PROCEDURE — 77030019908 HC STETH ESOPH SIMS -A: Performed by: STUDENT IN AN ORGANIZED HEALTH CARE EDUCATION/TRAINING PROGRAM

## 2022-11-13 PROCEDURE — 77030010507 HC ADH SKN DERMBND J&J -B: Performed by: ORTHOPAEDIC SURGERY

## 2022-11-13 PROCEDURE — 84100 ASSAY OF PHOSPHORUS: CPT

## 2022-11-13 PROCEDURE — 77030031139 HC SUT VCRL2 J&J -A: Performed by: ORTHOPAEDIC SURGERY

## 2022-11-13 PROCEDURE — 77030038692 HC WND DEB SYS IRMX -B: Performed by: ORTHOPAEDIC SURGERY

## 2022-11-13 PROCEDURE — 77030013797 HC KT TRNSDUC PRSSR EDWD -A: Performed by: STUDENT IN AN ORGANIZED HEALTH CARE EDUCATION/TRAINING PROGRAM

## 2022-11-13 PROCEDURE — 77030020788: Performed by: ORTHOPAEDIC SURGERY

## 2022-11-13 PROCEDURE — 65270000046 HC RM TELEMETRY

## 2022-11-13 PROCEDURE — C1776 JOINT DEVICE (IMPLANTABLE): HCPCS | Performed by: ORTHOPAEDIC SURGERY

## 2022-11-13 PROCEDURE — 77030035236 HC SUT PDS STRATFX BARB J&J -B: Performed by: ORTHOPAEDIC SURGERY

## 2022-11-13 PROCEDURE — 77030011264 HC ELECTRD BLD EXT COVD -A: Performed by: ORTHOPAEDIC SURGERY

## 2022-11-13 DEVICE — TRIDENT II TRITANIUM MULTIHOLE ACETABULAR SHELL 58F
Type: IMPLANTABLE DEVICE | Site: HIP | Status: FUNCTIONAL
Brand: TRIDENT II

## 2022-11-13 DEVICE — ANATOMIC UNIVERSAL TAPER FEMORAL HEAD
Type: IMPLANTABLE DEVICE | Site: HIP | Status: FUNCTIONAL
Brand: BIOLOX

## 2022-11-13 DEVICE — UNIVERSAL V40 TAPER ADAPTER SLEEVE
Type: IMPLANTABLE DEVICE | Site: HIP | Status: FUNCTIONAL
Brand: ADAPTER SLEEVE

## 2022-11-13 DEVICE — TRIDENT X3 0 DEGREE POLYETHYLENE INSERT
Type: IMPLANTABLE DEVICE | Site: HIP | Status: FUNCTIONAL
Brand: TRIDENT X3 INSERT

## 2022-11-13 DEVICE — HIP STEM - STANDARD OFFSET
Type: IMPLANTABLE DEVICE | Site: HIP | Status: FUNCTIONAL
Brand: INSIGNIA

## 2022-11-13 DEVICE — HIP H2 TOT ADV OTHER HD -- IMPL CAPPED H2: Type: IMPLANTABLE DEVICE | Status: FUNCTIONAL

## 2022-11-13 RX ORDER — MIDAZOLAM HYDROCHLORIDE 1 MG/ML
INJECTION, SOLUTION INTRAMUSCULAR; INTRAVENOUS AS NEEDED
Status: DISCONTINUED | OUTPATIENT
Start: 2022-11-13 | End: 2022-11-13 | Stop reason: HOSPADM

## 2022-11-13 RX ORDER — POLYETHYLENE GLYCOL 3350 17 G/17G
17 POWDER, FOR SOLUTION ORAL DAILY
Status: DISCONTINUED | OUTPATIENT
Start: 2022-11-14 | End: 2022-11-17 | Stop reason: HOSPADM

## 2022-11-13 RX ORDER — FACIAL-BODY WIPES
10 EACH TOPICAL DAILY PRN
Status: DISCONTINUED | OUTPATIENT
Start: 2022-11-15 | End: 2022-11-17 | Stop reason: HOSPADM

## 2022-11-13 RX ORDER — ROPIVACAINE HYDROCHLORIDE 5 MG/ML
INJECTION, SOLUTION EPIDURAL; INFILTRATION; PERINEURAL AS NEEDED
Status: DISCONTINUED | OUTPATIENT
Start: 2022-11-13 | End: 2022-11-13 | Stop reason: HOSPADM

## 2022-11-13 RX ORDER — ETOMIDATE 2 MG/ML
INJECTION INTRAVENOUS AS NEEDED
Status: DISCONTINUED | OUTPATIENT
Start: 2022-11-13 | End: 2022-11-13 | Stop reason: HOSPADM

## 2022-11-13 RX ORDER — FENTANYL CITRATE 50 UG/ML
INJECTION, SOLUTION INTRAMUSCULAR; INTRAVENOUS AS NEEDED
Status: DISCONTINUED | OUTPATIENT
Start: 2022-11-13 | End: 2022-11-13 | Stop reason: HOSPADM

## 2022-11-13 RX ORDER — DEXAMETHASONE SODIUM PHOSPHATE 100 MG/10ML
10 INJECTION INTRAMUSCULAR; INTRAVENOUS ONCE
Status: COMPLETED | OUTPATIENT
Start: 2022-11-14 | End: 2022-11-14

## 2022-11-13 RX ORDER — ALBUMIN HUMAN 50 G/1000ML
SOLUTION INTRAVENOUS AS NEEDED
Status: DISCONTINUED | OUTPATIENT
Start: 2022-11-13 | End: 2022-11-13 | Stop reason: HOSPADM

## 2022-11-13 RX ORDER — HEPARIN SODIUM 5000 [USP'U]/ML
5000 INJECTION, SOLUTION INTRAVENOUS; SUBCUTANEOUS EVERY 8 HOURS
Status: DISCONTINUED | OUTPATIENT
Start: 2022-11-14 | End: 2022-11-16

## 2022-11-13 RX ORDER — ONDANSETRON 2 MG/ML
INJECTION INTRAMUSCULAR; INTRAVENOUS AS NEEDED
Status: DISCONTINUED | OUTPATIENT
Start: 2022-11-13 | End: 2022-11-13 | Stop reason: HOSPADM

## 2022-11-13 RX ORDER — NOREPINEPHRINE BITARTRATE/D5W 8 MG/250ML
PLASTIC BAG, INJECTION (ML) INTRAVENOUS
Status: DISCONTINUED | OUTPATIENT
Start: 2022-11-13 | End: 2022-11-13 | Stop reason: HOSPADM

## 2022-11-13 RX ORDER — ASPIRIN 81 MG/1
81 TABLET ORAL 2 TIMES DAILY
Status: DISCONTINUED | OUTPATIENT
Start: 2022-11-14 | End: 2022-11-16

## 2022-11-13 RX ORDER — LIDOCAINE HYDROCHLORIDE 20 MG/ML
INJECTION, SOLUTION EPIDURAL; INFILTRATION; INTRACAUDAL; PERINEURAL AS NEEDED
Status: DISCONTINUED | OUTPATIENT
Start: 2022-11-13 | End: 2022-11-13 | Stop reason: HOSPADM

## 2022-11-13 RX ORDER — DEXAMETHASONE SODIUM PHOSPHATE 4 MG/ML
INJECTION, SOLUTION INTRA-ARTICULAR; INTRALESIONAL; INTRAMUSCULAR; INTRAVENOUS; SOFT TISSUE AS NEEDED
Status: DISCONTINUED | OUTPATIENT
Start: 2022-11-13 | End: 2022-11-13 | Stop reason: HOSPADM

## 2022-11-13 RX ORDER — TRANEXAMIC ACID 100 MG/ML
INJECTION, SOLUTION INTRAVENOUS AS NEEDED
Status: DISCONTINUED | OUTPATIENT
Start: 2022-11-13 | End: 2022-11-13 | Stop reason: HOSPADM

## 2022-11-13 RX ORDER — NALOXONE HYDROCHLORIDE 0.4 MG/ML
0.4 INJECTION, SOLUTION INTRAMUSCULAR; INTRAVENOUS; SUBCUTANEOUS AS NEEDED
Status: DISCONTINUED | OUTPATIENT
Start: 2022-11-13 | End: 2022-11-13

## 2022-11-13 RX ORDER — CEFAZOLIN SODIUM/WATER 2 G/20 ML
SYRINGE (ML) INTRAVENOUS AS NEEDED
Status: DISCONTINUED | OUTPATIENT
Start: 2022-11-13 | End: 2022-11-13 | Stop reason: HOSPADM

## 2022-11-13 RX ORDER — PHENYLEPHRINE HCL IN 0.9% NACL 0.4MG/10ML
SYRINGE (ML) INTRAVENOUS AS NEEDED
Status: DISCONTINUED | OUTPATIENT
Start: 2022-11-13 | End: 2022-11-13 | Stop reason: HOSPADM

## 2022-11-13 RX ORDER — NOREPINEPHRINE BITARTRATE/D5W 8 MG/250ML
.5-16 PLASTIC BAG, INJECTION (ML) INTRAVENOUS
Status: DISCONTINUED | OUTPATIENT
Start: 2022-11-13 | End: 2022-11-15

## 2022-11-13 RX ORDER — ACETAMINOPHEN 500 MG
1000 TABLET ORAL EVERY 8 HOURS
Status: DISCONTINUED | OUTPATIENT
Start: 2022-11-13 | End: 2022-11-17 | Stop reason: HOSPADM

## 2022-11-13 RX ORDER — MIDAZOLAM HYDROCHLORIDE 1 MG/ML
INJECTION, SOLUTION INTRAMUSCULAR; INTRAVENOUS
Status: COMPLETED
Start: 2022-11-13 | End: 2022-11-13

## 2022-11-13 RX ORDER — SUCCINYLCHOLINE CHLORIDE 20 MG/ML
INJECTION INTRAMUSCULAR; INTRAVENOUS AS NEEDED
Status: DISCONTINUED | OUTPATIENT
Start: 2022-11-13 | End: 2022-11-13 | Stop reason: HOSPADM

## 2022-11-13 RX ORDER — ROCURONIUM BROMIDE 10 MG/ML
INJECTION, SOLUTION INTRAVENOUS AS NEEDED
Status: DISCONTINUED | OUTPATIENT
Start: 2022-11-13 | End: 2022-11-13 | Stop reason: HOSPADM

## 2022-11-13 RX ORDER — DEXAMETHASONE SODIUM PHOSPHATE 4 MG/ML
10 INJECTION, SOLUTION INTRA-ARTICULAR; INTRALESIONAL; INTRAMUSCULAR; INTRAVENOUS; SOFT TISSUE ONCE
Status: DISCONTINUED | OUTPATIENT
Start: 2022-11-13 | End: 2022-11-13 | Stop reason: SDUPTHER

## 2022-11-13 RX ADMIN — Medication 80 MCG: at 10:10

## 2022-11-13 RX ADMIN — ALBUMIN (HUMAN) 250 ML: 2.5 SOLUTION INTRAVENOUS at 10:28

## 2022-11-13 RX ADMIN — Medication 1 AMPULE: at 20:45

## 2022-11-13 RX ADMIN — METOPROLOL SUCCINATE 25 MG: 25 TABLET, FILM COATED, EXTENDED RELEASE ORAL at 14:16

## 2022-11-13 RX ADMIN — SODIUM CHLORIDE, PRESERVATIVE FREE 10 ML: 5 INJECTION INTRAVENOUS at 14:00

## 2022-11-13 RX ADMIN — LIDOCAINE HYDROCHLORIDE 100 MG: 20 INJECTION, SOLUTION EPIDURAL; INFILTRATION; INTRACAUDAL; PERINEURAL at 09:03

## 2022-11-13 RX ADMIN — TRANEXAMIC ACID 1 G: 100 INJECTION, SOLUTION INTRAVENOUS at 10:17

## 2022-11-13 RX ADMIN — DEXAMETHASONE SODIUM PHOSPHATE 8 MG: 4 INJECTION, SOLUTION INTRAMUSCULAR; INTRAVENOUS at 09:20

## 2022-11-13 RX ADMIN — MIDAZOLAM HYDROCHLORIDE 1 MG: 1 INJECTION, SOLUTION INTRAMUSCULAR; INTRAVENOUS at 08:58

## 2022-11-13 RX ADMIN — TRANEXAMIC ACID 1 G: 100 INJECTION, SOLUTION INTRAVENOUS at 09:10

## 2022-11-13 RX ADMIN — FENTANYL CITRATE 25 MCG: 50 INJECTION, SOLUTION INTRAMUSCULAR; INTRAVENOUS at 09:03

## 2022-11-13 RX ADMIN — FUROSEMIDE 60 MG: 40 TABLET ORAL at 14:15

## 2022-11-13 RX ADMIN — Medication 2 G: at 09:21

## 2022-11-13 RX ADMIN — SODIUM CHLORIDE, PRESERVATIVE FREE 10 ML: 5 INJECTION INTRAVENOUS at 06:45

## 2022-11-13 RX ADMIN — Medication 1 AMPULE: at 11:20

## 2022-11-13 RX ADMIN — SENNOSIDES AND DOCUSATE SODIUM 2 TABLET: 50; 8.6 TABLET ORAL at 14:15

## 2022-11-13 RX ADMIN — DULOXETINE 30 MG: 30 CAPSULE, DELAYED RELEASE ORAL at 14:15

## 2022-11-13 RX ADMIN — SUCCINYLCHOLINE CHLORIDE 140 MG: 20 INJECTION, SOLUTION INTRAMUSCULAR; INTRAVENOUS at 09:03

## 2022-11-13 RX ADMIN — ETOMIDATE 20 MG: 2 INJECTION, SOLUTION INTRAVENOUS at 09:03

## 2022-11-13 RX ADMIN — FAMOTIDINE 20 MG: 10 INJECTION, SOLUTION INTRAVENOUS at 20:45

## 2022-11-13 RX ADMIN — POTASSIUM CHLORIDE 10 MEQ: 750 TABLET, FILM COATED, EXTENDED RELEASE ORAL at 14:16

## 2022-11-13 RX ADMIN — DEXMEDETOMIDINE HYDROCHLORIDE 5 MCG: 100 INJECTION, SOLUTION, CONCENTRATE INTRAVENOUS at 09:46

## 2022-11-13 RX ADMIN — AMIODARONE HYDROCHLORIDE 0.5 MG/MIN: 50 INJECTION, SOLUTION INTRAVENOUS at 07:02

## 2022-11-13 RX ADMIN — ROCURONIUM BROMIDE 20 MG: 10 INJECTION INTRAVENOUS at 09:24

## 2022-11-13 RX ADMIN — SODIUM CHLORIDE, PRESERVATIVE FREE 10 ML: 5 INJECTION INTRAVENOUS at 21:34

## 2022-11-13 RX ADMIN — CEFAZOLIN 2 G: 1 INJECTION, POWDER, FOR SOLUTION INTRAMUSCULAR; INTRAVENOUS at 21:33

## 2022-11-13 RX ADMIN — NOREPINEPHRINE BITARTRATE 1 MCG/MIN: 1 SOLUTION INTRAVENOUS at 12:02

## 2022-11-13 RX ADMIN — NIACIN 1500 MG: 500 TABLET, EXTENDED RELEASE ORAL at 21:33

## 2022-11-13 RX ADMIN — ACETAMINOPHEN 1000 MG: 500 TABLET ORAL at 21:33

## 2022-11-13 RX ADMIN — Medication 2 MCG/MIN: at 09:11

## 2022-11-13 RX ADMIN — FAMOTIDINE 20 MG: 10 INJECTION, SOLUTION INTRAVENOUS at 14:15

## 2022-11-13 RX ADMIN — Medication 3 AMPULE: at 08:07

## 2022-11-13 RX ADMIN — FENTANYL CITRATE 50 MCG: 50 INJECTION, SOLUTION INTRAMUSCULAR; INTRAVENOUS at 10:01

## 2022-11-13 RX ADMIN — FENTANYL CITRATE 50 MCG: 50 INJECTION, SOLUTION INTRAMUSCULAR; INTRAVENOUS at 09:24

## 2022-11-13 RX ADMIN — ROCURONIUM BROMIDE 5 MG: 10 INJECTION INTRAVENOUS at 09:03

## 2022-11-13 RX ADMIN — ROCURONIUM BROMIDE 20 MG: 10 INJECTION INTRAVENOUS at 09:20

## 2022-11-13 RX ADMIN — Medication 80 MCG: at 10:12

## 2022-11-13 RX ADMIN — ROCURONIUM BROMIDE 20 MG: 10 INJECTION INTRAVENOUS at 10:12

## 2022-11-13 RX ADMIN — FENTANYL CITRATE 25 MCG: 50 INJECTION, SOLUTION INTRAMUSCULAR; INTRAVENOUS at 08:58

## 2022-11-13 RX ADMIN — DEXMEDETOMIDINE HYDROCHLORIDE 5 MCG: 100 INJECTION, SOLUTION, CONCENTRATE INTRAVENOUS at 09:20

## 2022-11-13 RX ADMIN — AMIODARONE HYDROCHLORIDE 0.5 MG/MIN: 50 INJECTION, SOLUTION INTRAVENOUS at 19:34

## 2022-11-13 RX ADMIN — ONDANSETRON HYDROCHLORIDE 4 MG: 2 INJECTION, SOLUTION INTRAMUSCULAR; INTRAVENOUS at 10:17

## 2022-11-13 NOTE — PERIOP NOTES
TRANSFER - OUT REPORT:    Verbal report given to AVRIL Munguia(name) on Bolivar Schulte  being transferred to 2540(unit) for routine post - op       Report consisted of patients Situation, Background, Assessment and   Recommendations(SBAR). Information from the following report(s) SBAR, Kardex, ED Summary, OR Summary, Procedure Summary, Intake/Output, MAR, Accordion, Recent Results, Med Rec Status, Cardiac Rhythm AV Paced, Alarm Parameters , Pre Procedure Checklist, Procedure Verification, and Quality Measures was reviewed with the receiving nurse. Opportunity for questions and clarification was provided.       Patient transported with:   Monitor  O2 @ 4 liters  Registered Nurse

## 2022-11-13 NOTE — ANESTHESIA PREPROCEDURE EVALUATION
Relevant Problems   CARDIOVASCULAR   (+) Chronic systolic congestive heart failure (HCC)   (+) Essential hypertension   (+) Heart block   (+) Pacemaker       Anesthetic History   No history of anesthetic complications            Review of Systems / Medical History  Patient summary reviewed, nursing notes reviewed and pertinent labs reviewed    Pulmonary  Within defined limits                 Neuro/Psych         TIA     Cardiovascular    Hypertension      CHF  Dysrhythmias   Pacemaker (Biventricular AICD (8/14/17)), past MI (2010), CAD and cardiac stents    Exercise tolerance: <4 METS  Comments: Heart Block s/p Biventricular AICD (8/14/17)    Troponin level elevated at 317 at 1102 am on 11/10/22, up from 148 at 0530 on 11/10/22 - Patient seen by Cardiology and taken to cath lab for diagnostic cath prior to proceeding with surgery. ECG (11/10/22): Atrial sensed ventricular-paced rhythm, Biventricular pacemaker detected           GI/Hepatic/Renal               Comments: UTI (per UA of 11/29/21) - chronic per patient - patient self-catheterizes at home Endo/Other  Within defined limits           Other Findings   Comments: Right Hip Fracture             Physical Exam    Airway  Mallampati: II  TM Distance: > 6 cm  Neck ROM: normal range of motion   Mouth opening: Normal     Cardiovascular  Regular rate and rhythm,  S1 and S2 normal,  no murmur, click, rub, or gallop             Dental    Dentition: Caps/crowns and Poor dentition  Comments: Several missing teeth, none loose. Pulmonary  Breath sounds clear to auscultation               Abdominal  GI exam deferred       Other Findings            Anesthetic Plan    ASA: 4, emergent  Anesthesia type: general    Monitoring Plan: BIS and Arterial line      Induction: Intravenous  Anesthetic plan and risks discussed with: Patient      Unable to place intrathecal catheter in preop.  Will proceed with slow controlled induction with Clayton and Central line in place for iv access.

## 2022-11-13 NOTE — ANESTHESIA POSTPROCEDURE EVALUATION
Procedure(s):  RIGHT HIP ARTHROPLASTY. general    Anesthesia Post Evaluation      Multimodal analgesia: multimodal analgesia used between 6 hours prior to anesthesia start to PACU discharge  Patient location during evaluation: PACU  Patient participation: complete - patient participated  Level of consciousness: sleepy but conscious  Pain management: adequate  Airway patency: patent  Anesthetic complications: no  Cardiovascular status: acceptable, blood pressure returned to baseline and hemodynamically stable  Respiratory status: acceptable and nasal cannula  Hydration status: acceptable  Post anesthesia nausea and vomiting:  none  Final Post Anesthesia Temperature Assessment:  Normothermia (36.0-37.5 degrees C)      INITIAL Post-op Vital signs:   Vitals Value Taken Time   BP 90/64 11/13/22 1245   Temp 36.9 °C (98.5 °F) 11/13/22 1200   Pulse 70 11/13/22 1256   Resp 20 11/13/22 1256   SpO2 94 % 11/13/22 1256   Vitals shown include unvalidated device data.

## 2022-11-13 NOTE — PROGRESS NOTES
1900: Bedside shift change report given to [address] [place] [address] (oncoming nurse) by Arnold Alberts (offgoing nurse). Report included the following information SBAR, Kardex, Intake/Output, MAR, Recent Results, and Cardiac Rhythm AV paced . 2000: Shift assessment complete. Patient AOx4. Lung sounds are diminished/coarse bilaterally; on 2LNC. Pulses palpable and present in all four extremities. Amio gtt currently infusing at 0.5mg/min. See Flowsheets and MAR for more details. 2038: Prn Tylenol given for generalized discomfort. 0000: Reassessment complete. No change to previous assessment. See Flowsheets and MAR for more details. 0400: Reassessment complete. No change to previous assessment. See Flowsheets and MAR for more details. 0700: Bedside shift change report given to Arnold Alberts (oncoming nurse) by [address] [place] [address] (offgoing nurse). Report included the following information SBAR, Kardex, Intake/Output, MAR, Recent Results, and Cardiac Rhythm AV paced .

## 2022-11-13 NOTE — PROGRESS NOTES
Progress Note      11/13/2022 2:43 PM  NAME: Gene Raphael   MRN:  [id number]   Admit Diagnosis: Right hip pain [M25.551]  Closed right hip fracture [facility]) [S72.001A]     Primary Cardiologist: Follows at [facility]  Physician Requesting consult: Dr Samantha Butcher:       NSTEMI, mild trop elevation  Fall with hip fracture   HTN  Chronic systolic heart failure  Remote ICD  Samantha Gisabra at the [facility]    Cath on 11/10/2022 by Dr Toyin Andre showed no CAD  VF during coronary injection, s/p Shock  (initial ICD shock failed ?)   NSVT and frequent PVCs while undergoing Hip surgery - hip surgery aborted   Low BP when induced for hip surgery   Started on Amio gtt on 11/11  S/p hip surgery on 11/13       Recommendations:    Cont amiodarone gtt> change to  PO tomorrow    Keep K >4, Mg >2   Cont metoprolol   Hold Entresto for low BP              [x]        High complexity decision making was performed    Subjective:     HPI:  Sleeping, sedated   S/p surgery         Objective:      Physical Exam:    Last 24hrs VS reviewed since prior progress note.  Most recent are:    Visit Vitals  BP 90/64   Pulse 70   Temp 98.5 °F (36.9 °C)   Resp 21   Ht 6' 6\" (1.981 m)   Wt 110 kg (242 lb 8.1 oz)   SpO2 95%   BMI 28.02 kg/m²       Intake/Output Summary (Last 24 hours) at 11/13/2022 1300  Last data filed at 11/13/2022 1135  Gross per 24 hour   Intake 1690 ml   Output 1320 ml   Net 370 ml         General: sleeping, no acute distress   Neck: Supple   Respiratory: No respiratory distress, clear lung sound   Cardiovascular: Regular rate rhythm, S1S2, no murmur   Abdomen: soft, non tender, non distended   Neuro: moves all extremities, oriented x3   Skin: warm and dry   Extremity: no edema, warm to touch      Data Review    Telemetry: Paced Rhythm      Lab Data Personally Reviewed:    Recent Labs     11/13/22  0443 11/12/22  0440   WBC 12.0* 11.8*   HGB 13.5 12.4   HCT 40.7 36.3*    170     No results for input(s): INR, PTP, APTT, INREXT, INREXT in the last 72 hours. Recent Labs     11/13/22  0443 11/12/22  0440 11/11/22  0341 11/10/22  1846   * 135* 137 136   K 4.3 3.5 3.6 3.5   CL 99 99 101 102   CO2 30 30 29 26   BUN 24* 25* 21* 14   CREA 1.06 1.03 1.04 0.91   * 191* 200* 147*   CA 9.5 8.7 8.8 8.7   MG  --  2.3 2.2 2.1     No results for input(s): CPK, CKNDX, TROIQ in the last 72 hours. No lab exists for component: CPKMB  Lab Results   Component Value Date/Time    Cholesterol, total 142 03/05/2010 02:25 PM    HDL Cholesterol 47 03/05/2010 02:25 PM    LDL, calculated 82.4 03/05/2010 02:25 PM    Triglyceride 63 03/05/2010 02:25 PM    CHOL/HDL Ratio 3.0 03/05/2010 02:25 PM       Recent Labs     11/13/22 0443      TP 6.8   ALB 2.3*   GLOB 4.5*     No results for input(s): PH, PCO2, PO2 in the last 72 hours.     Medications Personally Reviewed:    Current Facility-Administered Medications   Medication Dose Route Frequency    alcohol 62% (NOZIN) nasal  1 Ampule  1 Ampule Topical Q12H    NOREPINephrine (LEVOPHED) 8 mg in 5% dextrose 250mL (32 mcg/mL) infusion  0.5-16 mcg/min IntraVENous TITRATE    calcium carbonate (TUMS) chewable tablet 200 mg [elemental]  200 mg Oral PRN    lidocaine 4 % patch 1 Patch  1 Patch TransDERmal Q24H    amiodarone (CORDARONE) 375 mg/250 mL D5W infusion  0.5-1 mg/min IntraVENous TITRATE    [Held by provider] heparin (porcine) injection 5,000 Units  5,000 Units SubCUTAneous Q8H    famotidine (PF) (PEPCID) 20 mg in 0.9% sodium chloride 10 mL injection  20 mg IntraVENous Q12H    alcohol 62% (NOZIN) nasal  1 Ampule  1 Ampule Topical Q12H    DULoxetine (CYMBALTA) capsule 30 mg  30 mg Oral DAILY    furosemide (LASIX) tablet 60 mg  60 mg Oral DAILY    metoprolol succinate (TOPROL-XL) XL tablet 25 mg  25 mg Oral DAILY    niacin ER (NIASPAN) tablet 1,500 mg  1,500 mg Oral QHS    potassium chloride SR (KLOR-CON 10) tablet 10 mEq  10 mEq Oral DAILY    [Held by provider] sacubitriL-valsartan (ENTRESTO) 24-26 mg tablet 1 Tablet  1 Tablet Oral BID    sodium chloride (NS) flush 5-40 mL  5-40 mL IntraVENous Q8H    sodium chloride (NS) flush 5-40 mL  5-40 mL IntraVENous PRN    acetaminophen (TYLENOL) tablet 650 mg  650 mg Oral Q6H PRN    Or    acetaminophen (TYLENOL) suppository 650 mg  650 mg Rectal Q6H PRN    polyethylene glycol (MIRALAX) packet 17 g  17 g Oral DAILY PRN    ondansetron (ZOFRAN ODT) tablet 4 mg  4 mg Oral Q8H PRN    Or    ondansetron (ZOFRAN) injection 4 mg  4 mg IntraVENous Q6H PRN    oxyCODONE IR (ROXICODONE) tablet 5 mg  5 mg Oral Q4H PRN    morphine injection 2 mg  2 mg IntraVENous Q4H PRN    naloxone (NARCAN) injection 0.4 mg  0.4 mg IntraVENous PRN    senna-docusate (PERICOLACE) 8.6-50 mg per tablet 2 Tablet  2 Tablet Oral BID              Danika Sanders MD

## 2022-11-13 NOTE — PERIOP NOTES
Handoff Report from Operating Room to PACU    Report received from KAYE Marks RN and Marcy Garrett CRNA regarding Kenya Meade. Surgeon(s):  Brisa Stark MD  And Procedure(s) (LRB):  RIGHT HIP ARTHROPLASTY (Right)  confirmed   with allergies and dressings discussed. Anesthesia type, drugs, patient history, complications, estimated blood loss, vital signs, intake and output, and last pain medication, lines, reversal medications, and temperature were reviewed.

## 2022-11-13 NOTE — PERIOP NOTES
TRANSFER - IN REPORT:    Verbal report received from Tawny(name) on Nick Palma  being received from 2540(unit) for ordered procedure      Report consisted of patients Situation, Background, Assessment and   Recommendations(SBAR). Information from the following report(s) SBAR, Kardex, Intake/Output, MAR, Accordion, Recent Results, and Cardiac Rhythm AV Paced  was reviewed with the receiving nurse. Opportunity for questions and clarification was provided. Assessment completed upon patients arrival to unit and care assumed.

## 2022-11-13 NOTE — PROGRESS NOTES
0700: [facility] to [facility] (oncoming nurse). Report included the following information SBAR, Kardex, Intake/Output, MAR, Recent Results, and Cardiac Rhythm AV paced. 0725: Pt off unit to OR for right hip fracture repair. 4954: Updated patient's wife, Christopher Stout, of patient off the unit to OR for right hip fracture repair. Christopher Stout verbalized understanding of current condition and plan of care. 1125: Phone report received from Arlette Hubbard in PACU. Report included SBAR, Kardex, Intake/Output, MAR, Recent results, and Cardiac Rhythm AV Paced, Rate: 79. RN informed AVRIL Hubbard of pt's wife requesting to speak with OR surgeon. Pt on Levo @ 3 mcg and Amio @ 0.5 mg/min. Left Radial A-Line placed. R IJ CVC remains in place, 2 ports infusing, one port unable to flush or aspirate. Pt on 6L NC. Workman remains in place, patent and draining.    1200: Pt returned from OR post-op to CCU 2540.    1217: Initial assessment completed. See flowsheet for full assessment. 1219: Levophed stopped. 1600: Pt reassessed. See flowsheet for full assessment. No significant changes. 1830: Left Radial A-Line removed. No bleeding or hematoma. Transparent dressing applied. 1845: Attempted to call report to PCU. RN performing bedside procedure, will call back.     1900: Report given AVRIL Payne (oncoming nurse) by AVRIL Ernandez (offgoing nurse)

## 2022-11-13 NOTE — OP NOTES
OPERATIVE REPORT    FACILITY: [facility]    PATIENT NAME: Coleen Hsieh     DATE OF OPERATION: 11/13/22    PREOPERATIVE DIAGNOSIS: right femoral neck fracture    POSTOPERATIVE DIAGNOSIS: same    OPERATIVE PROCEDURE:   1. Right total hip arthroplasty for femoral neck fracture, CPT 79485  2. Fluoroscopy, directed by and interpreted by surgeon - CPT 49853    ATTENDING PHYSICIAN: Luciano Bailon MD    ASSISTANT: Kosta Lawrence (Performing all or most of the following assistant-at-surgery services including but not limited to: proper patient positioning, sterile/prep and draping, placement of instruments/trackers, operative exposure, minor portions of bone / soft tissue excision, final irrigation and debridement, deep and superficial closure, application of final dressings)    IMPLANTS:    Doc Insignia size 7 std  Tritanium II 58 mm multihole cup, 40 neutral poly  Biolox Delta 40 mm +4 head    SPECIMENS:  none    OPERATIVE FINDINGS: Displaced femoral neck fracture, clinical osteopenia    ANESTHESIA: general    FLUIDS:  Please see anesthesia record    ESTIMATED BLOOD LOSS: 300 cc    INDICATIONS FOR PROCEDURE:   This patient is a 68 y.o. male who presented to our institution after a fall. I outlined risks involved with the operation, including, but not limited to bleeding, infection, damage to neurovascular structures, limb length discrepancy, persistent limp, need for blood transfusion, fracture, hardware failure, dislocation, and medical risks including blood clots, stroke, heart attack, and even death. After a detailed description of the risks, benefits, and alternatives, the patient elected to proceed with total hip arthroplasty for femoral neck fracture. DETAILED DESCRIPTION OF PROCEDURE:   After anesthesia was induced, the patient was placed on the operative table. The patient's was prepped and draped in the usual fashion. Appropriate timeouts were performed verifying the correct patient, side, and operation. Preoperative antibiotics were administered. An incision was made over the affected hip two fingerbreadths distal and lateral to the ASIS extending in line towards the ipsilateral fibular head. Dissection was taken through skin and subcutaneous tissue. Hemostasis was obtained. The fascia overlying the tensor fascia humphrey was incised and developed medially over to the medial border of the TFL muscle belly by blunt dissection. The superior femoral neck was palpated and two cobra retractors were placed superior and inferior to the femoral neck. The ascending branches of the lateral femoral circumflex arteries were identified and coagulated. The capsule was identified and cleared of pericapsular fat. The capsulotomy was made and tagged with sutures. The femoral neck fracture was identified, and the neck cut was freshened in keeping with the preoperative planned level and angle. The femoral head was removed with a corkscrew. Attention was turned to the acetabulum. Retractors were placed around it for exposure. The pulvinar and labrum was removed. The acetabulum was sequentially reamed up to size 58mm. A trial cup was placed, and found to fit well. The final acetabular component was placed under fluoroscopic guidance to the desired depth, inclination, and anteversion. No supplemental screws were deemed necessary. A liner was placed. Attention was then turned to the femur. The superior capsular flap was used to peel the superior capsule from the femoral neck and sequential releases were completed posteriorly and medially along the inside of the greater trochanter with care taken to avoid release of the abductors and obturator externus. After releases allowed for adequate exposure of the proximal femur, retractors were placed on the medial and superolateral portions of the proximal femur. The canal was opened with a box osteotome and it was then sounded with a canal finder.  Sequential broaching was completed up to size 7 stem. The calcar reamer was used to smooth rough bone edges down to the level of the broach. A trial neck and ball were placed and the hip was reduced. I felt that the combination of a std neck and +4 ball gave the best combination of offset, stability, leg lengths and restoration of hip mechanics. The trial components were removed. The femoral canal was washed and cleaned and the final stem was placed. The final head was impacted in line with the neck after cleaning and drying the trunion. The components were reduced and final fluoroscopy was completed to confirm appropriate component position without apparent complication. The wound was then irrigated and closed in layers. A #1 vicryl stitch was used to reapproximate the capsule, followed by a #2 Quill stitch for the fascia overlying the TFL. Subcutaneous closure was done in a layered fashion with 3-0 monocryl and dermabond for the final skin layer. A sterile dressing was applied. All sponge and needle counts were correct at the end of the case. The patient was awoken from the operation without complication and was transported to PACU in stable condition. All counts were correct at the conclusion of the case. DISPOSITION: Stable to PACU, X-rays to be completed in PACU    POSTOPERATIVE PLAN: Postoperatively, the patient will be allowed to weight bear as tolerated. No extremes of motion will be allowed, and the patient should ambulate with a walker. Physical therapy will be ordered to help with the recovery process. DVT prophylaxis will be ordered, with the agent chosen stratified for the patient's identified risk factors. FOLLOW UP: We will plan to see the patient back in clinic approximately 2 weeks after surgery for a wound check and follow up on clinical progress.

## 2022-11-14 ENCOUNTER — APPOINTMENT (OUTPATIENT)
Dept: GENERAL RADIOLOGY | Age: 77
DRG: 521 | End: 2022-11-14
Attending: ORTHOPAEDIC SURGERY
Payer: MEDICARE

## 2022-11-14 LAB
ANION GAP SERPL CALC-SCNC: 5 MMOL/L (ref 5–15)
BUN SERPL-MCNC: 29 MG/DL (ref 6–20)
BUN/CREAT SERPL: 24 (ref 12–20)
CALCIUM SERPL-MCNC: 9.5 MG/DL (ref 8.5–10.1)
CHLORIDE SERPL-SCNC: 100 MMOL/L (ref 97–108)
CO2 SERPL-SCNC: 29 MMOL/L (ref 21–32)
CREAT SERPL-MCNC: 1.23 MG/DL (ref 0.7–1.3)
GLUCOSE BLD STRIP.AUTO-MCNC: 267 MG/DL (ref 65–117)
GLUCOSE BLD STRIP.AUTO-MCNC: 291 MG/DL (ref 65–117)
GLUCOSE BLD STRIP.AUTO-MCNC: 317 MG/DL (ref 65–117)
GLUCOSE SERPL-MCNC: 255 MG/DL (ref 65–100)
HGB BLD-MCNC: 12.1 G/DL (ref 12.1–17)
PHOSPHATE SERPL-MCNC: 3 MG/DL (ref 2.6–4.7)
POTASSIUM SERPL-SCNC: 4.6 MMOL/L (ref 3.5–5.1)
SERVICE CMNT-IMP: ABNORMAL
SODIUM SERPL-SCNC: 134 MMOL/L (ref 136–145)

## 2022-11-14 PROCEDURE — 97162 PT EVAL MOD COMPLEX 30 MIN: CPT | Performed by: PHYSICAL THERAPIST

## 2022-11-14 PROCEDURE — 74011250637 HC RX REV CODE- 250/637: Performed by: PHYSICIAN ASSISTANT

## 2022-11-14 PROCEDURE — 74011250637 HC RX REV CODE- 250/637: Performed by: GENERAL ACUTE CARE HOSPITAL

## 2022-11-14 PROCEDURE — 84100 ASSAY OF PHOSPHORUS: CPT

## 2022-11-14 PROCEDURE — 85018 HEMOGLOBIN: CPT

## 2022-11-14 PROCEDURE — 74011250636 HC RX REV CODE- 250/636: Performed by: PHYSICIAN ASSISTANT

## 2022-11-14 PROCEDURE — 82962 GLUCOSE BLOOD TEST: CPT

## 2022-11-14 PROCEDURE — 74011000258 HC RX REV CODE- 258: Performed by: ORTHOPAEDIC SURGERY

## 2022-11-14 PROCEDURE — 74011250637 HC RX REV CODE- 250/637: Performed by: ORTHOPAEDIC SURGERY

## 2022-11-14 PROCEDURE — 65270000046 HC RM TELEMETRY

## 2022-11-14 PROCEDURE — 97535 SELF CARE MNGMENT TRAINING: CPT

## 2022-11-14 PROCEDURE — 97530 THERAPEUTIC ACTIVITIES: CPT

## 2022-11-14 PROCEDURE — 36415 COLL VENOUS BLD VENIPUNCTURE: CPT

## 2022-11-14 PROCEDURE — 72170 X-RAY EXAM OF PELVIS: CPT

## 2022-11-14 PROCEDURE — 74011250637 HC RX REV CODE- 250/637: Performed by: INTERNAL MEDICINE

## 2022-11-14 PROCEDURE — 74011250636 HC RX REV CODE- 250/636: Performed by: ORTHOPAEDIC SURGERY

## 2022-11-14 PROCEDURE — 97165 OT EVAL LOW COMPLEX 30 MIN: CPT

## 2022-11-14 PROCEDURE — 74011000250 HC RX REV CODE- 250: Performed by: ORTHOPAEDIC SURGERY

## 2022-11-14 PROCEDURE — 97530 THERAPEUTIC ACTIVITIES: CPT | Performed by: PHYSICAL THERAPIST

## 2022-11-14 PROCEDURE — 77010033678 HC OXYGEN DAILY

## 2022-11-14 PROCEDURE — 80048 BASIC METABOLIC PNL TOTAL CA: CPT

## 2022-11-14 PROCEDURE — 51798 US URINE CAPACITY MEASURE: CPT

## 2022-11-14 PROCEDURE — 74011636637 HC RX REV CODE- 636/637: Performed by: INTERNAL MEDICINE

## 2022-11-14 RX ORDER — OXYCODONE HYDROCHLORIDE 5 MG/1
5 TABLET ORAL AS NEEDED
Status: DISCONTINUED | OUTPATIENT
Start: 2022-11-14 | End: 2022-11-14 | Stop reason: HOSPADM

## 2022-11-14 RX ORDER — INSULIN LISPRO 100 [IU]/ML
INJECTION, SOLUTION INTRAVENOUS; SUBCUTANEOUS
Status: DISCONTINUED | OUTPATIENT
Start: 2022-11-14 | End: 2022-11-17 | Stop reason: HOSPADM

## 2022-11-14 RX ORDER — HYDROMORPHONE HYDROCHLORIDE 1 MG/ML
0.2 INJECTION, SOLUTION INTRAMUSCULAR; INTRAVENOUS; SUBCUTANEOUS
Status: DISCONTINUED | OUTPATIENT
Start: 2022-11-14 | End: 2022-11-14 | Stop reason: HOSPADM

## 2022-11-14 RX ORDER — ONDANSETRON 2 MG/ML
4 INJECTION INTRAMUSCULAR; INTRAVENOUS AS NEEDED
Status: DISCONTINUED | OUTPATIENT
Start: 2022-11-14 | End: 2022-11-14 | Stop reason: HOSPADM

## 2022-11-14 RX ORDER — AMIODARONE HYDROCHLORIDE 200 MG/1
400 TABLET ORAL EVERY 8 HOURS
Status: DISCONTINUED | OUTPATIENT
Start: 2022-11-14 | End: 2022-11-17 | Stop reason: HOSPADM

## 2022-11-14 RX ORDER — MAGNESIUM SULFATE 100 %
4 CRYSTALS MISCELLANEOUS AS NEEDED
Status: DISCONTINUED | OUTPATIENT
Start: 2022-11-14 | End: 2022-11-17 | Stop reason: HOSPADM

## 2022-11-14 RX ORDER — FENTANYL CITRATE 50 UG/ML
25 INJECTION, SOLUTION INTRAMUSCULAR; INTRAVENOUS
Status: DISCONTINUED | OUTPATIENT
Start: 2022-11-14 | End: 2022-11-14 | Stop reason: HOSPADM

## 2022-11-14 RX ORDER — FAMOTIDINE 20 MG/1
20 TABLET, FILM COATED ORAL EVERY 12 HOURS
Status: DISCONTINUED | OUTPATIENT
Start: 2022-11-14 | End: 2022-11-17 | Stop reason: HOSPADM

## 2022-11-14 RX ADMIN — METOPROLOL SUCCINATE 25 MG: 25 TABLET, FILM COATED, EXTENDED RELEASE ORAL at 09:40

## 2022-11-14 RX ADMIN — SENNOSIDES AND DOCUSATE SODIUM 2 TABLET: 50; 8.6 TABLET ORAL at 18:14

## 2022-11-14 RX ADMIN — SACUBITRIL AND VALSARTAN 1 TABLET: 24; 26 TABLET, FILM COATED ORAL at 18:29

## 2022-11-14 RX ADMIN — SODIUM CHLORIDE, PRESERVATIVE FREE 10 ML: 5 INJECTION INTRAVENOUS at 06:03

## 2022-11-14 RX ADMIN — FAMOTIDINE 20 MG: 10 INJECTION, SOLUTION INTRAVENOUS at 09:44

## 2022-11-14 RX ADMIN — ASPIRIN 81 MG: 81 TABLET, COATED ORAL at 18:14

## 2022-11-14 RX ADMIN — ACETAMINOPHEN 1000 MG: 500 TABLET ORAL at 15:02

## 2022-11-14 RX ADMIN — Medication 5 UNITS: at 16:27

## 2022-11-14 RX ADMIN — AMIODARONE HYDROCHLORIDE 0.5 MG/MIN: 50 INJECTION, SOLUTION INTRAVENOUS at 08:30

## 2022-11-14 RX ADMIN — AMIODARONE HYDROCHLORIDE 400 MG: 200 TABLET ORAL at 22:28

## 2022-11-14 RX ADMIN — CEFAZOLIN 2 G: 1 INJECTION, POWDER, FOR SOLUTION INTRAMUSCULAR; INTRAVENOUS at 06:03

## 2022-11-14 RX ADMIN — Medication 1 AMPULE: at 20:32

## 2022-11-14 RX ADMIN — FUROSEMIDE 60 MG: 40 TABLET ORAL at 09:40

## 2022-11-14 RX ADMIN — SENNOSIDES AND DOCUSATE SODIUM 2 TABLET: 50; 8.6 TABLET ORAL at 09:39

## 2022-11-14 RX ADMIN — DULOXETINE 30 MG: 30 CAPSULE, DELAYED RELEASE ORAL at 09:42

## 2022-11-14 RX ADMIN — ACETAMINOPHEN 1000 MG: 500 TABLET ORAL at 22:27

## 2022-11-14 RX ADMIN — ASPIRIN 81 MG: 81 TABLET, COATED ORAL at 09:42

## 2022-11-14 RX ADMIN — SODIUM CHLORIDE, PRESERVATIVE FREE 10 ML: 5 INJECTION INTRAVENOUS at 15:06

## 2022-11-14 RX ADMIN — AMIODARONE HYDROCHLORIDE 400 MG: 200 TABLET ORAL at 09:42

## 2022-11-14 RX ADMIN — DEXAMETHASONE SODIUM PHOSPHATE 10 MG: 10 INJECTION, SOLUTION INTRAMUSCULAR; INTRAVENOUS at 10:04

## 2022-11-14 RX ADMIN — Medication 1 LOZENGE: at 04:38

## 2022-11-14 RX ADMIN — AMIODARONE HYDROCHLORIDE 400 MG: 200 TABLET ORAL at 15:02

## 2022-11-14 RX ADMIN — Medication 7 UNITS: at 12:17

## 2022-11-14 RX ADMIN — FAMOTIDINE 20 MG: 20 TABLET, FILM COATED ORAL at 20:32

## 2022-11-14 RX ADMIN — POLYETHYLENE GLYCOL 3350 17 G: 17 POWDER, FOR SOLUTION ORAL at 09:37

## 2022-11-14 RX ADMIN — NIACIN 1500 MG: 500 TABLET, EXTENDED RELEASE ORAL at 22:28

## 2022-11-14 RX ADMIN — POTASSIUM CHLORIDE 10 MEQ: 750 TABLET, FILM COATED, EXTENDED RELEASE ORAL at 09:39

## 2022-11-14 RX ADMIN — SODIUM CHLORIDE, PRESERVATIVE FREE 10 ML: 5 INJECTION INTRAVENOUS at 22:40

## 2022-11-14 RX ADMIN — ACETAMINOPHEN 1000 MG: 500 TABLET ORAL at 06:03

## 2022-11-14 RX ADMIN — SACUBITRIL AND VALSARTAN 1 TABLET: 24; 26 TABLET, FILM COATED ORAL at 09:57

## 2022-11-14 RX ADMIN — Medication 1 AMPULE: at 10:00

## 2022-11-14 NOTE — PROGRESS NOTES
Pod 1 xenia for fracture, in icu still  Pain well managed  Hgb 12.1    Patient Vitals for the past 24 hrs:   Temp Pulse Resp BP SpO2   11/14/22 1305 -- 70 23 104/71 92 %   11/14/22 1200 97 °F (36.1 °C) 74 18 121/80 98 %   11/14/22 1147 -- 71 -- 101/65 94 %   11/14/22 1135 -- 71 -- 95/67 94 %   11/14/22 1041 -- 77 18 106/67 96 %   11/14/22 1000 -- 75 19 90/61 95 %   11/14/22 0900 -- 96 18 (!) 118/93 92 %   11/14/22 0808 -- -- -- -- 99 %   11/14/22 0801 97.7 °F (36.5 °C) 87 18 108/76 97 %   11/14/22 0700 -- 92 18 100/69 96 %   11/14/22 0400 97.5 °F (36.4 °C) 85 21 114/84 97 %   11/14/22 0200 -- 73 21 115/86 96 %   11/14/22 0000 (!) 96.7 °F (35.9 °C) 70 16 108/77 97 %   11/13/22 2300 -- 71 18 106/79 96 %   11/13/22 2000 98.5 °F (36.9 °C) 80 22 106/78 93 %   11/13/22 1900 -- 74 22 98/78 --   11/13/22 1800 -- 78 24 99/68 --   11/13/22 1700 -- 92 22 95/67 94 %   11/13/22 1600 98.2 °F (36.8 °C) 88 20 90/68 91 %   11/13/22 1500 -- 76 18 96/66 92 %     Dressing clean and dry  Large ecchymosis posterior upper leg  Musculoskeletal: Samantha's sign negative in bilateral lower extremities. Calves soft, supple, non-tender upon palpation or with passive stretch.      Oob with pt when medically stable  Wbat  Transfer to ortho when medically ready

## 2022-11-14 NOTE — PROGRESS NOTES
Problem: Mobility Impaired (Adult and Pediatric)  Goal: *Acute Goals and Plan of Care (Insert Text)  Description: FUNCTIONAL STATUS PRIOR TO ADMISSION: Patient poor historian providing inconsistent reports of PLOF. Appears he was ambulating in home using walking stick? ??, but has RW. Attempting to obtain scooter for community mobility. HOME SUPPORT PRIOR TO ADMISSION: The patient lived with family and required assistance for ADLs. Physical Therapy Goals  Initiated 11/14/2022  1. Patient will move from supine to sit and sit to supine , scoot up and down, and roll side to side in bed with minimal assistance/contact guard assist within 7 day(s). 2.  Patient will transfer from bed to chair and chair to bed with moderate assistance  using the least restrictive device within 7 day(s). 3.  Patient will perform sit to stand with moderate assistance  within 7 day(s). 4.  Patient will ambulate with moderate assistance  for 10 feet with the least restrictive device within 7 day(s). 5.  Patient will ascend/descend 4 stairs with 2 handrail(s) with moderate assistance  within 7 day(s). Outcome: Not Met   PHYSICAL THERAPY EVALUATION  Patient: Ed Ramos (66 y.o. male)  Date: 11/14/2022  Primary Diagnosis: Right hip pain [M25.551]  Closed right hip fracture (HCC) [S72.001A]  Procedure(s) (LRB):  RIGHT HIP ARTHROPLASTY (Right) 1 Day Post-Op   Precautions: WBAT; falls       ASSESSMENT  Based on the objective data described below, the patient presents with generalized weakness and significantly impaired functional mobility and balance following GLF at home requiring R THR. Hospital course complicated by cardiac issues. Patient alert and oriented x 4, but is noted to be confused and demonstrating decreased safety awareness. He was able transition to EOB with min A of 2 and sitting balance is good, but standing balance is poor with strong L lateral lean noted.  Patient requiring mod/max A to max A of 2 for sit<>stand tranfers. He demonstrates internal rotation of R LE but is able to achieve neutral hip position with manual assistance. He demonstrates poor ability to shift weight and advance either LE laterally. Unable to safely transfer to chair therefore patient returned to supine with max A of 2. Wife arrived during tx session and is very verbose, speaking over therapists and directing patient which further impaired patient's ability to safely participate in therapy. Current Level of Function Impacting Discharge (mobility/balance): max A of 2    Functional Outcome Measure: The patient scored 15/110 on the Barthel Index outcome measure which is indicative of dependence. Other factors to consider for discharge: confusion     Patient will benefit from skilled therapy intervention to address the above noted impairments. PLAN :  Recommendations and Planned Interventions: bed mobility training, transfer training, gait training, therapeutic exercises, patient and family training/education, and therapeutic activities      Frequency/Duration: Patient will be followed by physical therapy:  daily to address goals. Recommendation for discharge: (in order for the patient to meet his/her long term goals)  To be determined: rehab ; wife would like patient to return to home with HHPT and OT    This discharge recommendation:  Has not yet been discussed the attending provider and/or case management    IF patient discharges home will need the following DME: to be determined (TBD); uncertain what patient owns at ome currently         SUBJECTIVE:   Patient stated I walk with my 6 foot cane, it is better than a cane.     OBJECTIVE DATA SUMMARY:   HISTORY:    Past Medical History:   Diagnosis Date    Chronic systolic congestive heart failure ([facility] [address] 08/14/2017    Essential hypertension 08/14/2017    Heart block 08/14/2017    Pacemaker 08/14/2017     Past Surgical History:   Procedure Laterality Date    HX BLADDER REPAIR HX CORONARY STENT PLACEMENT      HX HIP REPLACEMENT Left 12/2021    HX PACEMAKER      ALSO MULTI  GENERATOR CHANGES       Personal factors and/or comorbidities impacting plan of care: fall with fx requiring L THR last year    Home Situation  Home Environment: Private residence  # Steps to Enter: 1  Rails to Enter: No  One/Two Story Residence:  (3 levels)  # of Interior Steps: 22 (10 up; 12 down; B rails up; R rail down)  Lift Chair Available: Yes  Living Alone: No  Support Systems: Spouse/Significant Other, Other Family Member(s) (wife, son, granddaughter)  Patient Expects to be Discharged to[de-identified] Home  Current DME Used/Available at Home: Walker, rolling, Wheelchair, power, Commode, bedside (6 ft walking cane)    EXAMINATION/PRESENTATION/DECISION MAKING:   Critical Behavior:  Neurologic State: Alert, Appropriate for age  Orientation Level: Oriented X4 (w/ increased time required for birth date)  Cognition: Follows commands, Decreased attention/concentration  Safety/Judgement: Decreased insight into deficits, Decreased awareness of need for safety, Decreased awareness of need for assistance, Fall prevention  Hearing: Auditory  Auditory Impairment: None  Hearing Aids/Status: At home    Range Of Motion:  AROM: Generally decreased, functional                       Strength:    Strength: Generally decreased, functional                    Tone & Sensation:                  Sensation: Intact               Coordination:  Coordination: Generally decreased, functional  Vision:   Acuity: Within Defined Limits  Corrective Lenses: Glasses  Functional Mobility:  Bed Mobility:     Supine to Sit: Minimum assistance; Moderate assistance;Assist x2     Scooting: Contact guard assistance  Transfers:  Sit to Stand: Maximum assistance; Moderate assistance;Assist x2 (requires R knee manual blocking)  Stand to Sit: Maximum assistance;Assist x2                       Balance:   Sitting: Impaired  Sitting - Static: Good (unsupported)  Sitting - Dynamic: Fair (occasional)  Standing: Impaired  Standing - Static: Poor;Constant support (L lateral lean)  Standing - Dynamic : Poor;Constant support (L lateral lean)  Ambulation/Gait Training:         Patient attempting to side step to head of bed, but poor ability to advance LEs; strong L lateral lean and R knee buckling noted            Functional Measure:  Barthel Index:    Bathin  Bladder: 0  Bowels: 5  Groomin  Dressin  Feedin  Mobility: 0  Stairs: 0  Toilet Use: 0  Transfer (Bed to Chair and Back): 0  Total: 15/100       The Barthel ADL Index: Guidelines  1. The index should be used as a record of what a patient does, not as a record of what a patient could do. 2. The main aim is to establish degree of independence from any help, physical or verbal, however minor and for whatever reason. 3. The need for supervision renders the patient not independent. 4. A patient's performance should be established using the best available evidence. Asking the patient, friends/relatives and nurses are the usual sources, but direct observation and common sense are also important. However direct testing is not needed. 5. Usually the patient's performance over the preceding 24-48 hours is important, but occasionally longer periods will be relevant. 6. Middle categories imply that the patient supplies over 50 per cent of the effort. 7. Use of aids to be independent is allowed. Score Interpretation (from [address] [address])    Independent   60-79 Minimally independent   40-59 Partially dependent   20-39 Very dependent   <20 Totally dependent     -Remy Vanegas., Barthel, D.W. (1965). Functional evaluation: the Barthel Index. [facility] ([address] (1997). The Barthel activities of daily living index: self-reporting versus actual performance in the old (> or = 75 years).  Journal of [address] [place] [address] 45(7), 14 [facility], J.JLYNN, Tate Miller., Manolo Escalera (1999). Measuring the change in disability after inpatient rehabilitation; comparison of the responsiveness of the Barthel Index and Functional [place] Measure. Journal of Neurology, Neurosurgery, and Psychiatry, 66(4), 561-042. SAULO Lutz, MAY Banegas, & Elly Marcano M.A. (2004) Assessment of post-stroke quality of life in cost-effectiveness studies: The usefulness of the Barthel Index and the EuroQoL-5D. Quality of Life Research, 15, 261-66             Activity Tolerance:   Poor and requires rest breaks    After treatment patient left in no apparent distress:   Caregiver / family present, Side rails x 3, and bed in chair position    COMMUNICATION/EDUCATION:   The patients plan of care was discussed with: Occupational therapist and Registered nurse. Fall prevention education was provided and the patient/caregiver indicated understanding., Patient/family have participated as able in goal setting and plan of care. , and Patient/family agree to work toward stated goals and plan of care.     Thank you for this referral.  Eileen Brunner, PT   Time Calculation: 46 mins

## 2022-11-14 NOTE — PROGRESS NOTES
Progress Note      11/14/2022 2:43 PM  NAME: Eloy Grimes   MRN:  [id number]   Admit Diagnosis: Right hip pain [M25.551]  Closed right hip fracture [facility]) [S72.001A]     Primary Cardiologist: Follows at [address] [place] [address]  Physician Requesting consult: Dr Rosa Rodriguez     Assessment:       NSTEMI, mild trop elevation  Fall with hip fracture   HTN  Chronic systolic heart failure  Remote ICD  Fito Sosa at the [facility]    Cath on 11/10/2022 by Dr Tamia Oswald showed no CAD  VF during coronary injection, s/p Shock  (initial ICD shock failed ?)   NSVT and frequent PVCs while undergoing Hip surgery - hip surgery aborted   Low BP when induced for hip surgery   Started on Amio gtt on 11/11  S/p hip surgery on 11/13    Per Dr. Nancie Del Real   \"Ventricular fibrillation during injection of the left coronary system during cath 11/10. His BIV-ICD successfully sensed this and the successfully charged and then shocked this to sinus with 31J energy. He had early recurrence of VF which may have been triggered by PVC early after BIV pacing post-shock which led to prompt external shock. There was no internal shock failure. Regarding the initial VF during cath, this may have been precipitated by quick contrast-induced changes, decreased oxygenation in the microvasculature during injection, pressure stretch of myocardium with injection, transient QT dispersion, etc.  In any count, this has been described before in certain patients. It was NOT due to R-on-T mechanism or device-triggered AT FIRST. The second episode of recurrent VF may have been R-on-T related phenomenon after review of the strips as described above. He was rescued by external defibrillation for the second episode after a few seconds but likely would have done by internal if had not subsided on its own. \"       Recommendations:    Cont amiodarone gtt for 24 more hours; change to 400mg TID today. Discharge on 400mg daily. Will try and talk to [address] [place] [address] EP today.   May need DFTs in a month or so.  Keep K >4, Mg >2   Cont metoprolol   Hold Entresto for low BP; resume as able  Dispo per primary             [x]        High complexity decision making was performed    Subjective:     HPI:  S/p surgery         Objective:      Physical Exam:    Last 24hrs VS reviewed since prior progress note. Most recent are:    Visit Vitals  /76 (BP 1 Location: Right upper arm, BP Patient Position: At rest)   Pulse 87   Temp 97.7 °F (36.5 °C)   Resp 18   Ht 6' 6\" (1.981 m)   Wt 111 kg (244 lb 11.4 oz)   SpO2 99%   BMI 28.28 kg/m²       Intake/Output Summary (Last 24 hours) at 11/14/2022 0900  Last data filed at 11/14/2022 0804  Gross per 24 hour   Intake 2031 ml   Output 1645 ml   Net 386 ml           General: sleeping, no acute distress   Neck: Supple   Respiratory: No respiratory distress, clear lung sound   Cardiovascular: Regular rate rhythm, S1S2, no murmur   Abdomen: soft, non tender, non distended   Neuro: moves all extremities, oriented x3   Skin: warm and dry   Extremity: no edema, warm to touch      Data Review    Telemetry: Paced Rhythm      Lab Data Personally Reviewed:    Recent Labs     11/14/22 0358 11/13/22 0443 11/12/22 0440   WBC  --  12.0* 11.8*   HGB 12.1 13.5 12.4   HCT  --  40.7 36.3*   PLT  --  195 170       No results for input(s): INR, PTP, APTT, INREXT, INREXT in the last 72 hours. Recent Labs     11/14/22 0358 11/13/22 0443 11/12/22 0440   * 133* 135*   K 4.6 4.3 3.5    99 99   CO2 29 30 30   BUN 29* 24* 25*   CREA 1.23 1.06 1.03   * 173* 191*   CA 9.5 9.5 8.7   MG  --   --  2.3       No results for input(s): CPK, CKNDX, TROIQ in the last 72 hours.     No lab exists for component: CPKMB  Lab Results   Component Value Date/Time    Cholesterol, total 142 03/05/2010 02:25 PM    HDL Cholesterol 47 03/05/2010 02:25 PM    LDL, calculated 82.4 03/05/2010 02:25 PM    Triglyceride 63 03/05/2010 02:25 PM    CHOL/HDL Ratio 3.0 03/05/2010 02:25 PM       Recent Labs 11/13/22  0443      TP 6.8   ALB 2.3*   GLOB 4.5*       No results for input(s): PH, PCO2, PO2 in the last 72 hours.     Medications Personally Reviewed:    Current Facility-Administered Medications   Medication Dose Route Frequency    benzocaine-menthoL (CHLORASEPTIC MAX) lozenge 1 Lozenge  1 Lozenge Oral Q2H PRN    amiodarone (CORDARONE) tablet 400 mg  400 mg Oral Q8H    alcohol 62% (NOZIN) nasal  1 Ampule  1 Ampule Topical Q12H    [Held by provider] heparin (porcine) injection 5,000 Units  5,000 Units SubCUTAneous Q8H    sacubitriL-valsartan (ENTRESTO) 24-26 mg tablet 1 Tablet  1 Tablet Oral BID    acetaminophen (TYLENOL) tablet 1,000 mg  1,000 mg Oral Q8H    polyethylene glycol (MIRALAX) packet 17 g  17 g Oral DAILY    [START ON 11/15/2022] bisacodyL (DULCOLAX) suppository 10 mg  10 mg Rectal DAILY PRN    aspirin delayed-release tablet 81 mg  81 mg Oral BID    NOREPINephrine (LEVOPHED) 8 mg in 5% dextrose 250mL (32 mcg/mL) infusion  0.5-16 mcg/min IntraVENous TITRATE    dexamethasone (DECADRON) 10 mg/mL injection 10 mg  10 mg IntraVENous ONCE    calcium carbonate (TUMS) chewable tablet 200 mg [elemental]  200 mg Oral PRN    lidocaine 4 % patch 1 Patch  1 Patch TransDERmal Q24H    amiodarone (CORDARONE) 375 mg/250 mL D5W infusion  0.5-1 mg/min IntraVENous TITRATE    famotidine (PF) (PEPCID) 20 mg in 0.9% sodium chloride 10 mL injection  20 mg IntraVENous Q12H    alcohol 62% (NOZIN) nasal  1 Ampule  1 Ampule Topical Q12H    DULoxetine (CYMBALTA) capsule 30 mg  30 mg Oral DAILY    furosemide (LASIX) tablet 60 mg  60 mg Oral DAILY    metoprolol succinate (TOPROL-XL) XL tablet 25 mg  25 mg Oral DAILY    niacin ER (NIASPAN) tablet 1,500 mg  1,500 mg Oral QHS    potassium chloride SR (KLOR-CON 10) tablet 10 mEq  10 mEq Oral DAILY    sodium chloride (NS) flush 5-40 mL  5-40 mL IntraVENous Q8H    sodium chloride (NS) flush 5-40 mL  5-40 mL IntraVENous PRN    polyethylene glycol (MIRALAX) packet 17 g  17 g Oral DAILY PRN    ondansetron (ZOFRAN ODT) tablet 4 mg  4 mg Oral Q8H PRN    Or    ondansetron (ZOFRAN) injection 4 mg  4 mg IntraVENous Q6H PRN    oxyCODONE IR (ROXICODONE) tablet 5 mg  5 mg Oral Q4H PRN    morphine injection 2 mg  2 mg IntraVENous Q4H PRN    naloxone (NARCAN) injection 0.4 mg  0.4 mg IntraVENous PRN    senna-docusate (PERICOLACE) 8.6-50 mg per tablet 2 Tablet  2 Tablet Oral BID              [facility] III, DO

## 2022-11-14 NOTE — PROGRESS NOTES
0700  Bedside and verbal report from Álvaro Burnham RN  0800  Assessment completed by Joy Loo RN and this writer. 0900  Took diet well. To bedpan, but passed only flatus. 1030  Again to bedpan, but passed only flatus. 1200  Report given to Josiah Aranda RN. PT and OT working with patient.

## 2022-11-14 NOTE — PROGRESS NOTES
Removed camp at 1300, removed 10mL from the balloon. No bleeding from the meatus on removal.  Removed IJ at 1300, tip intact, patient turned head to the left and exhaled while removal of the line was done. Held pressure with gauze for 5 minutes. No bleeding when transparent dressing applied and signed.

## 2022-11-14 NOTE — PROGRESS NOTES
1900: Bedside shift change report given to [address] [place] [address] (oncoming nurse) by Lollie Crigler (offgoing nurse). Report included the following information SBAR, Kardex, OR Summary, Intake/Output, MAR, Recent Results, and Cardiac Rhythm AV paced . 2000: Shift assessment complete. Patient AOx4. Lung sounds are diminished bilaterally; on 3LNC. Pulses palpable and present in BUE and LLE, pedal and postibial pulses present with doppler in RLE. Right hip incision dressing clean, dry, and intact. Amio gtt currently infusing at 0.5mg/min. See Flowsheets and MAR for more details. 0000: Reassessment complete. No change to previous assessment. See Flowsheets and MAR for more details. 9576: Patient requesting something to help with sore throat. PA notified via [employer]. Orders received for Chloraseptic lozenges. 0400: Reassessment complete. No change to previous assessment. See Flowsheets and MAR for more details. 0700: Bedside shift change report given to Bess (oncoming nurse) by [address] [place] [address] (offgoing nurse). Report included the following information SBAR, Kardex, Intake/Output, MAR, Recent Results, and Cardiac Rhythm AV paced .

## 2022-11-14 NOTE — PROGRESS NOTES
Hospitalist Progress Note    NAME: Leighann Phillips   :  1945   MRN:  [id number]       Assessment / Plan:    Recurrent non sustained V tach. -Hip surgery deferred due to dysrhythmia after intubation.  -Continue amiodarone, switch to PO  -S/p LHC on 11/10 was unremarkable. -EP consult appreciated. Right hip Fx.  -He was going to get hip surgery but canceled after intubation due to ongoing dysrhythmia.  -s/p Right total hip arthroplasty for femoral neck fracture on   PT OT pending     Chronic systolic HF  Continue diuresis as tolerated. Resume entresto      DVT prophylaxis: [facility] & snf  SUP: Pepcid. Code status: Full code. 25.0 - 29.9 Overweight / Body mass index is 28.02 kg/m². Recommended Disposition: [facility]/Rehab  Anticipated Discharge Date:  48 hours        Subjective:     Discussed with RN events overnight. Seen after surgery  No specific complaints     Review of Systems:  Symptom Y/N Comments  Symptom Y/N Comments   Fever/Chills    Chest Pain     Poor Appetite    Edema     Cough    Abdominal Pain     Sputum    Joint Pain     SOB/SOLARES    Pruritis/Rash     Nausea/vomit    Tolerating PT/OT     Diarrhea    Tolerating Diet     Constipation    Other       PO intake: No data found. Wt Readings from Last 10 Encounters:   22 110 kg (242 lb 8.1 oz)   12/10/21 109.4 kg (241 lb 2.9 oz)   21 103.4 kg (228 lb)   21 108 kg (238 lb)   17 112.9 kg (249 lb)   06/29/10 105.1 kg (231 lb 9.6 oz)   06/24/10 105.2 kg (232 lb)   06/22/10 105.7 kg (233 lb)   06/15/10 104.5 kg (230 lb 6.4 oz)   06/10/10 104.8 kg (231 lb 2 oz)       Objective:     VITALS:   Last 24hrs VS reviewed since prior progress note.  Most recent are:  Patient Vitals for the past 24 hrs:   Temp Pulse Resp BP SpO2   22 0200 -- 73 21 115/86 96 %   22 0000 (!) 96.7 °F (35.9 °C) 70 16 108/77 97 %   22 2300 -- 71 18 106/79 96 %   11/13/22 2000 98.5 °F (36.9 °C) 80 22 106/78 93 %   11/13/22 1900 -- 74 22 98/78 --   11/13/22 1800 -- 78 24 99/68 --   11/13/22 1700 -- 92 22 95/67 94 %   11/13/22 1600 98.2 °F (36.8 °C) 88 20 90/68 91 %   11/13/22 1500 -- 76 18 96/66 92 %   11/13/22 1415 -- 70 18 104/67 98 %   11/13/22 1400 -- 75 24 (!) 88/63 95 %   11/13/22 1301 -- 77 16 95/62 94 %   11/13/22 1245 -- 70 21 90/64 95 %   11/13/22 1230 -- 70 18 96/61 94 %   11/13/22 1225 -- 70 16 -- 96 %   11/13/22 1220 -- 71 17 -- 96 %   11/13/22 1217 98.5 °F (36.9 °C) 70 16 102/64 98 %   11/13/22 1215 -- 84 17 -- 97 %   11/13/22 1210 -- 84 19 -- 97 %   11/13/22 1205 -- 70 17 -- --   11/13/22 1202 -- 70 18 -- --   11/13/22 1200 98.5 °F (36.9 °C) 70 16 -- --   11/13/22 1145 -- 70 16 96/63 97 %   11/13/22 1140 -- 70 17 (!) 103/53 95 %   11/13/22 1135 98.2 °F (36.8 °C) 70 18 (!) 97/54 95 %   11/13/22 1130 -- 71 19 (!) 97/56 95 %   11/13/22 1125 -- 73 17 (!) 100/55 96 %   11/13/22 1120 -- 70 17 (!) 102/53 97 %   11/13/22 1115 -- 77 21 111/60 90 %   11/13/22 1110 -- 96 19 (!) 93/46 96 %   11/13/22 1105 -- 71 20 (!) 104/54 94 %   11/13/22 1102 98 °F (36.7 °C) 71 20 115/60 94 %   11/13/22 0730 96.8 °F (36 °C) 72 19 96/66 98 %   11/13/22 0700 -- 72 17 (!) 89/63 98 %   11/13/22 0600 -- 74 13 108/76 97 %       Intake/Output Summary (Last 24 hours) at 11/14/2022 0408  Last data filed at 11/14/2022 0200  Gross per 24 hour   Intake 1840 ml   Output 1435 ml   Net 405 ml        I had a face to face encounter, and independently examined this patient as outlined below:    PHYSICAL EXAM:  General:    No distress     HEENT: Atraumatic, anicteric sclerae, pink conjunctivae, MMM  Neck:  Supple, symmetrical  Lungs:   CTA. No Wheezing/Rhonchi. No rales. No tenderness. No Accessory muscle use. CVS:   Regular rhythm. No murmur. No JVD   GI/:   Soft. NT. ND. BS normal  Extremities: No edema. No cyanosis. No clubbing. Skin:     Not pale. Not Jaundiced.  No rashes Psych:  Good insight. Not depressed. Not anxious or agitated. Neurologic: Alert and oriented X 4. EOMs intact. No facial asymmetry. No slurred speech. Symmetrical strength, Sensation grossly intact. Labs     I reviewed today's most current labs and imaging studies. Pertinent labs include:  Recent Labs     11/14/22 0358 11/13/22 0443 11/12/22 0440   WBC  --  12.0* 11.8*   HGB 12.1 13.5 12.4   HCT  --  40.7 36.3*   PLT  --  195 170     Recent Labs     11/13/22 0443 11/12/22 0440   * 135*   K 4.3 3.5   CL 99 99   CO2 30 30   * 191*   BUN 24* 25*   CREA 1.06 1.03   CA 9.5 8.7   MG  --  2.3   PHOS 2.4* 1.7*   ALB 2.3*  --    TBILI 0.9  --    ALT 25  --      XR HIP RT W OR WO PELV 2-3 VWS    Result Date: 11/9/2022  Questionable nondisplaced right femoral neck fracture. XR HIP RT DURING OR PROC    Result Date: 11/13/2022  Interval right THR. CT HEAD WO CONT    Result Date: 11/9/2022  No acute intracranial finding. CT PELV WO CONT    Result Date: 11/9/2022  Mildly impacted subcapital right femoral neck fracture. Mild right hip osteoarthritis. XR CHEST PORT    Result Date: 11/12/2022  Mild edema pattern is slightly improved. Improving hazy left basilar atelectasis/airspace disease. Small left pleural effusion is likely unchanged. No definite pneumothorax. XR CHEST PORT    Result Date: 11/10/2022  1. ET tube and right IJ central line in appropriate position. 2. Shallow volumes, bibasilar atelectasis. 3. Pulmonary vascular congestion, probable interstitial edema. XR CHEST PORT    Result Date: 11/9/2022  No Acute Disease. XR HIP RT DURING OR PROC    Result Date: 11/13/2022  Interval right THR.       All Micro Results       None              Current Medications:     Current Facility-Administered Medications:     benzocaine-menthoL (CHLORASEPTIC MAX) lozenge 1 Lozenge, 1 Lozenge, Oral, Q2H PRN, Reasoner, Primitivo REID PA-C    alcohol 62% (NOZIN) nasal  1 Ampule, 1 Ampule, Topical, Q12H, Demond Parsons MD    heparin (porcine) injection 5,000 Units, 5,000 Units, SubCUTAneous, Q8H, Demond Parsons MD    sacubitriL-valsartan (ENTRESTO) 24-26 mg tablet 1 Tablet, 1 Tablet, Oral, BID, Demond Parsons MD    acetaminophen (TYLENOL) tablet 1,000 mg, 1,000 mg, Oral, Q8H, Demond Parsons MD, 1,000 mg at 11/13/22 2133    ceFAZolin (ANCEF) 2 g in sterile water (preservative free) 20 mL IV syringe, 2 g, IntraVENous, Q8H, Demond Parsons MD, 2 g at 11/13/22 2133    polyethylene glycol (MIRALAX) packet 17 g, 17 g, Oral, DAILY, Demond Parsons MD    [facility] ON 11/15/2022] bisacodyL (DULCOLAX) suppository 10 mg, 10 mg, Rectal, DAILY PRN, Demond Parsons MD    aspirin delayed-release tablet 81 mg, 81 mg, Oral, BID, Demond Parsons MD    NOREPINephrine (LEVOPHED) 8 mg in 5% dextrose 250mL (32 mcg/mL) infusion, 0.5-16 mcg/min, IntraVENous, TITRATE, Rebeca Rubi MD, Stopped at 11/13/22 1219    dexamethasone (DECADRON) 10 mg/mL injection 10 mg, 10 mg, IntraVENous, ONCE, Dago Monet PA-C    calcium carbonate (TUMS) chewable tablet 200 mg [elemental], 200 mg, Oral, PRN, Demond Parsons MD, 200 mg at 11/12/22 1158    lidocaine 4 % patch 1 Patch, 1 Patch, TransDERmal, Q24H, Demond Parsons MD, 1 Patch at 11/13/22 1741    amiodarone (CORDARONE) 375 mg/250 mL D5W infusion, 0.5-1 mg/min, IntraVENous, TITRATE, Demond Parsons MD, Last Rate: 20 mL/hr at 11/13/22 1934, 0.5 mg/min at 11/13/22 1934    famotidine (PF) (PEPCID) 20 mg in 0.9% sodium chloride 10 mL injection, 20 mg, IntraVENous, Q12H, Demond Parsons MD, 20 mg at 11/13/22 2045    alcohol 62% (NOZIN) nasal  1 Ampule, 1 Ampule, Topical, Q12H, Demond Parsons MD, 1 Ampule at 11/13/22 2045    DULoxetine (CYMBALTA) capsule 30 mg, 30 mg, Oral, DAILY, Demond Parsons MD, 30 mg at 11/13/22 1415    furosemide (LASIX) tablet 60 mg, 60 mg, Oral, DAILY, Demond Parsons MD, 60 mg at 11/13/22 1415    metoprolol succinate (TOPROL-XL) XL tablet 25 mg, 25 mg, Oral, DAILY, Rosalva Mcdonald MD, 25 mg at 11/13/22 1416    niacin ER (NIASPAN) tablet 1,500 mg, 1,500 mg, Oral, QHS, Rosalva Mcdonald MD, 1,500 mg at 11/13/22 2133    potassium chloride SR (KLOR-CON 10) tablet 10 mEq, 10 mEq, Oral, DAILY, Rosalva Mcdonald MD, 10 mEq at 11/13/22 1416    sodium chloride (NS) flush 5-40 mL, 5-40 mL, IntraVENous, Q8H, Rosalva Mcdonald MD, 10 mL at 11/13/22 2134    sodium chloride (NS) flush 5-40 mL, 5-40 mL, IntraVENous, PRN, Rosalva Mcdonald MD, 10 mL at 11/10/22 1220    polyethylene glycol (MIRALAX) packet 17 g, 17 g, Oral, DAILY PRN, Rosalva Mcdonald MD    ondansetron (ZOFRAN ODT) tablet 4 mg, 4 mg, Oral, Q8H PRN **OR** ondansetron (ZOFRAN) injection 4 mg, 4 mg, IntraVENous, Q6H PRN, Rosalva Mcdonald MD    oxyCODONE IR (ROXICODONE) tablet 5 mg, 5 mg, Oral, Q4H PRN, Rosalva Mcdonald MD, 5 mg at 11/12/22 1717    morphine injection 2 mg, 2 mg, IntraVENous, Q4H PRN, Rosalva Mcdonald MD, 2 mg at 11/12/22 0226    naloxone [facility]) injection 0.4 mg, 0.4 mg, IntraVENous, PRN, Rosalva Mcdonald MD    senna-docusate (PERICOLACE) 8.6-50 mg per tablet 2 Tablet, 2 Tablet, Oral, BID, Rosalva Mcdonald MD, 2 Tablet at 11/13/22 1415     Procedures: see electronic medical records for all procedures/Xrays and details which were not copied into this note but were reviewed prior to creation of Plan. Reviewed most current lab test results and cultures  YES  Reviewed most current radiology test results   YES  Review and summation of old records today    NO  Reviewed patient's current orders and MAR    YES  PMH/SH reviewed - no change compared to H&P  ________________________________________________________________________  Care Plan discussed with:    Comments   Patient x    Family      RN     Care Manager     Consultant                        Multidiciplinary team rounds were held today with , nursing, pharmacist and clinical coordinator.   Patient's plan of care was discussed; medications were reviewed and discharge planning was addressed.      ________________________________________________________________________  Total NON critical care TIME:   33  Minutes    Total CRITICAL CARE TIME Spent:   Minutes non procedure based      Comments   >50% of visit spent in counseling and coordination of care x     This includes time during multidisciplinary rounds if indicated above   ________________________________________________________________________  Sonia Curry MD

## 2022-11-14 NOTE — CARDIO/PULMONARY
Cardiac Rehab Note: chart review/referral     Cardiac cath 11/10/22:  \"Findings/PostOp Diagnosis:   1. No sig CAD  2. Normal LVEDP \"    Smoking history assessed. Patient is a non smoker.    Smoking Cessation Program link has not been added to the AVS.

## 2022-11-14 NOTE — PROGRESS NOTES
Spiritual Care Assessment/Progress Note  [facility]      NAME: Orin Borges      MRN: [id number]  AGE: 68 y.o.  SEX: male  Buddhist Affiliation: Druze   Language: English     11/14/2022     Total Time (in minutes): 13     Spiritual Assessment begun in [facility] CRITICAL CARE 1 through conversation with:         [x]Patient        [] Family    [] Friend(s)        Reason for Consult: Initial/Spiritual assessment, patient floor     Spiritual beliefs: (Please include comment if needed)     [x] Identifies with a claudia tradition:         [x] Supported by a claudia community:            [] Claims no spiritual orientation:           [] Seeking spiritual identity:                [] Adheres to an individual form of spirituality:           [] Not able to assess:                           Identified resources for coping:      [] Prayer                               [x] Music                  [] Guided Imagery     [] Family/friends                 [] Pet visits     [] Devotional reading                         [] Unknown     [] Other:                                                Interventions offered during this visit: (See comments for more details)    Patient Interventions: Initial/Spiritual assessment, patient floor, Life review/legacy, Normalization of emotional/spiritual concerns, Coping skills reviewed/reinforced, Affirmation of claudia, Catharsis/review of pertinent events in supportive environment, Iconic (affirming the presence of God/Higher Power), Buddhist beliefs/image of God discussed, Prayer (assurance of)           Plan of Care:     [x] Support spiritual and/or cultural needs    [] Support AMD and/or advance care planning process      [] Support grieving process   [] Coordinate Rites and/or Rituals    [] Coordination with community clergy   [] No spiritual needs identified at this time   [] Detailed Plan of Care below (See Comments)  [] Make referral to Music Therapy  [] Make referral to Pet Therapy     [] Make referral to Addiction services  [] Make referral to [facility]  [] Make referral to Spiritual Care Partner  [] No future visits requested        [x] Contact Spiritual Care for further referrals     Comments:  's visit was in CCU for initial spiritual assessment. Patient was in bed and appeared comfortable. He received visit warmly and engaged in conversation. 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 He declined music therapy at this time.  offered supportive listening presence and affirmed thoughts and feelings. He was offered space when medical team came in for care. Patient thanked  for the visit.        Visited by: Manson Libman To page chaplain: [phone]

## 2022-11-14 NOTE — PROGRESS NOTES
Problem: Self Care Deficits Care Plan (Adult)  Goal: *Acute Goals and Plan of Care (Insert Text)  Description: FUNCTIONAL STATUS PRIOR TO ADMISSION: Patient is a questionable historian. Per report, uses 6 ft walking stick in the home and RW when outside the home. Also reports being in the process of obtaining power wheelchair. Patient with history of falls. HOME SUPPORT: Per report, patient requires spouse assistance for lower body ADLs and bathing (min-mod A per patient description). Occupational Therapy Goals  Initiated 11/14/2022  1. Patient will perform lower body ADLs with AE and moderate assistance within 7 day(s). 2.  Patient will perform seated upper body ADLs with supervision/set-up within 7 day(s). 3.  Patient will tolerate static standing with RW support and moderate assistance in prep for standing ADLs within 7 day(s). 4.  Patient will perform toilet transfer to [facility] with moderate assistance within 7 day(s). 5.  Patient will perform all aspects of toileting with moderate assistance within 7 day(s). 6.  Patient will participate in upper extremity therapeutic exercise/activities with supervision/set-up for 10 minutes within 7 day(s). 7.  Patient will utilize energy conservation and fall prevention techniques during functional activities without cues within 7 day(s).      Outcome: Progressing Towards Goal   OCCUPATIONAL THERAPY EVALUATION  Patient: Mendy Rojas (84 y.o. male)  Date: 11/14/2022  Primary Diagnosis: Right hip pain [M25.551]  Closed right hip fracture (HCC) [S72.001A]  Procedure(s) (LRB):  RIGHT HIP ARTHROPLASTY (Right) 1 Day Post-Op   Precautions: No sudden or extreme motions w/ RLE; WBAT RLE       ASSESSMENT  Based on the objective data described below, the patient presents with impaired functional mobility and balance, decreased activity tolerance, mild confusion and altered mental status, and overall decline in functional independence s/p admission for R hip fracture, now POD 1 R hip arthroplasty. Patient is received resting in bed, agreeable to session. Patient is a questionable historian while obtaining PLOF with intermittent confusion and speech occasionally slurred / garbled. Patient mobilizes relatively well at bed level, achieving transition to sitting EOB with min assist x2. Patient is agreeable to trial sit<> prep for OOB ADL engagement, such as BSC-level toileting, standing with mod-max assist x2 with additional time needed to achieve upright trunk. Patient exhibits strong L lateral lean and preference for RLE internal rotation, requiring additional manual assist for safe RLE positioning. With attempted sidesteps, patient demonstrates great difficulty weight-shifting despite RW support and max assist x2. Of note, during functional mobility and transfer training, patient requires frequent redirection and cues for attention to task, distracted by spouse who was loudly speaking over therapist and giving own directions. Patient is far from baseline at this time; would recommend rehab at discharge for best outcome. Per spouse, hopeful for discharge home with [facility] and increased supervision/assist.    Vitals:    11/14/22 1135 11/14/22 1147 11/14/22 1200 11/14/22 1305   BP: 95/67 101/65 121/80 104/71   BP 1 Location: Right upper arm Right upper arm Right upper arm Right upper arm   BP Patient Position: At rest;Supine Sitting Sitting At rest   Pulse: 71 71 74 70   Temp:   97 °F (36.1 °C)    Resp:   18 23   Height:       Weight:       SpO2: 94% 94% 98% 92%        Current Level of Function Impacting Discharge (ADLs/self-care): up to total A    Functional Outcome Measure: The patient scored 15/100 on the Barthel Index. Other factors to consider for discharge:      Patient will benefit from skilled therapy intervention to address the above noted impairments.        PLAN :  Recommendations and Planned Interventions: self care training, functional mobility training, therapeutic exercise, balance training, therapeutic activities, cognitive retraining, endurance activities, patient education, home safety training, and family training/education    Frequency/Duration: Patient will be followed by occupational therapy 5 times a week to address goals. Recommendation for discharge: (in order for the patient to meet his/her long term goals)  For best outcome and maximal safety, recommend rehab at discharge; Spouse reports desire for discharge home with [facility] and increased supervision/assist.    This discharge recommendation:  Has not yet been discussed the attending provider and/or case management    IF patient discharges home will need the following DME: TBD       SUBJECTIVE:   Patient stated Nehal Boston, he was here. His wife is coming in tomorrow\" when wife asked patient whether doctor had been in to see him yet.     OBJECTIVE DATA SUMMARY:   HISTORY:   Past Medical History:   Diagnosis Date    Chronic systolic congestive heart failure ([address] 08/14/2017    Essential hypertension 08/14/2017    Heart block 08/14/2017    Pacemaker 08/14/2017     Past Surgical History:   Procedure Laterality Date    HX BLADDER REPAIR      HX CORONARY STENT PLACEMENT      HX HIP REPLACEMENT Left 12/2021    HX PACEMAKER      ALSO MULTI  GENERATOR CHANGES       Expanded or extensive additional review of patient history:     Home Situation  Home Environment: Private residence  # Steps to Enter: 1  Rails to Enter: No  One/Two Story Residence:  (3 levels)  # of Interior Steps: 22 (10 up; 12 down; B rails up; R rail down)  Lift Chair Available: Yes  Living Alone: No  Support Systems: Spouse/Significant Other, Other Family Member(s) (wife, son, granddaughter)  Patient Expects to be Discharged to[de-identified] Home  Current DME Used/Available at Home: Walker, rolling, Wheelchair, power, Commode, bedside (6 ft walking cane)    Hand dominance: Right    EXAMINATION OF PERFORMANCE DEFICITS:  Cognitive/Behavioral Status:  Neurologic State: Alert; Appropriate for age  Orientation Level: Oriented X4 (w/ increased time required for birth date)  Cognition: Follows commands;Decreased attention/concentration  Perception: Verbal;Visual;Tactile;Cues to maintain midline in standing (for trunk elongation in standing)  Perseveration: No perseveration noted  Safety/Judgement: Decreased insight into deficits; Decreased awareness of need for safety;Decreased awareness of need for assistance; Fall prevention    Skin:     Edema:     Hearing: Auditory  Auditory Impairment: None  Hearing Aids/Status: At home    Vision/Perceptual:    Acuity: Within Defined Limits    Corrective Lenses: Glasses    Range of Motion:  AROM: Generally decreased, functional    Strength:  Strength: Generally decreased, functional    Coordination:  Coordination: Generally decreased, functional  Fine Motor Skills-Upper: Left Intact; Right Intact    Gross Motor Skills-Upper: Left Intact; Right Intact (reports baseline L shoulder arthritis)    Tone & Sensation:  Sensation: Intact    Balance:  Sitting: Impaired  Sitting - Static: Good (unsupported)  Sitting - Dynamic: Fair (occasional)  Standing: Impaired  Standing - Static: Poor;Constant support (L lateral lean)  Standing - Dynamic : Poor;Constant support (L lateral lean)    Functional Mobility and Transfers for ADLs:  Bed Mobility:  Supine to Sit: Minimum assistance; Moderate assistance;Assist x2  Scooting: Contact guard assistance    Transfers:  Sit to Stand: Maximum assistance; Moderate assistance;Assist x2 (requires R knee manual blocking)  Stand to Sit: Maximum assistance;Assist x2    ADL Assessment:  Feeding: Setup;Stand-by assistance    Oral Facial Hygiene/Grooming: Setup;Stand-by assistance    Bathing: Moderate assistance    Type of Bath: Chlorhexidine (CHG); Full    Upper Body Dressing: Moderate assistance    Lower Body Dressing: Total assistance    Toileting:  Total assistance    ADL Intervention and task modifications:    Feeding  Feeding Assistance: Set-up    Lower Body Dressing Assistance  Dressing Assistance: Total assistance(dependent)  Socks: Total assistance (dependent)    Toileting  Bladder Hygiene: Total assistance (dependent) (w/ camp)    Cognitive Retraining  Safety/Judgement: Decreased insight into deficits; Decreased awareness of need for safety;Decreased awareness of need for assistance; Fall prevention    Precautions: Patient recalled and demonstrated avoiding extreme planes of movement with Right LE during ADLs and functional mobility with moderate cues. Functional Measure:    Barthel Index:  Bathin  Bladder: 0  Bowels: 5  Groomin  Dressin  Feedin  Mobility: 0  Stairs: 0  Toilet Use: 0  Transfer (Bed to Chair and Back): 0  Total: 15/100      The Barthel ADL Index: Guidelines  1. The index should be used as a record of what a patient does, not as a record of what a patient could do. 2. The main aim is to establish degree of independence from any help, physical or verbal, however minor and for whatever reason. 3. The need for supervision renders the patient not independent. 4. A patient's performance should be established using the best available evidence. Asking the patient, friends/relatives and nurses are the usual sources, but direct observation and common sense are also important. However direct testing is not needed. 5. Usually the patient's performance over the preceding 24-48 hours is important, but occasionally longer periods will be relevant. 6. Middle categories imply that the patient supplies over 50 per cent of the effort. 7. Use of aids to be independent is allowed. Score Interpretation (from [facility])    Independent   60-79 Minimally independent   40-59 Partially dependent   20-39 Very dependent   <20 Totally dependent     -Remy Vanegas., Barthel, D.W. (1965). Functional evaluation: the Barthel Index. [address] ([address] ().  The Barthel activities of daily living index: self-reporting versus actual performance in the old (> or = 75 years). Journal of [address] [place] [address] 457), [facility], JFAHEEMF, Abhijit Llanos., Lillian Schofield. (1999). Measuring the change in disability after inpatient rehabilitation; comparison of the responsiveness of the Barthel Index and Functional [place] Measure. Journal of Neurology, Neurosurgery, and Psychiatry, 66(4), 605-769. SAULO Bailey, MAY Banegas, & Tammie Álvarez M.A. (2004) Assessment of post-stroke quality of life in cost-effectiveness studies: The usefulness of the Barthel Index and the EuroQoL-5D. Quality of Life Research, 15, 870-05         Occupational Therapy Evaluation Charge Determination   History Examination Decision-Making   LOW Complexity : Brief history review  LOW Complexity : 1-3 performance deficits relating to physical, cognitive , or psychosocial skils that result in activity limitations and / or participation restrictions  LOW Complexity : No comorbidities that affect functional and no verbal or physical assistance needed to complete eval tasks       Based on the above components, the patient evaluation is determined to be of the following complexity level: LOW   Pain Rating:  Patient reports 3-4 / 10 pain at surgical site, tolerates session well. Activity Tolerance:   Fair and requires rest breaks    After treatment patient left in no apparent distress:    Supine in bed, Call bell within reach, Caregiver / family present, and Side rails x 3    COMMUNICATION/EDUCATION:   The patients plan of care was discussed with: Physical therapist and Registered nurse. Home safety education was provided and the patient/caregiver indicated understanding., Patient/family have participated as able in goal setting and plan of care. , and Patient/family agree to work toward stated goals and plan of care.     Thank you for this referral.  Mary Beth Mcgowan, OT  Time Calculation: 50 mins

## 2022-11-15 ENCOUNTER — APPOINTMENT (OUTPATIENT)
Dept: GENERAL RADIOLOGY | Age: 77
DRG: 521 | End: 2022-11-15
Attending: GENERAL ACUTE CARE HOSPITAL
Payer: MEDICARE

## 2022-11-15 ENCOUNTER — APPOINTMENT (OUTPATIENT)
Dept: ULTRASOUND IMAGING | Age: 77
DRG: 521 | End: 2022-11-15
Attending: GENERAL ACUTE CARE HOSPITAL
Payer: MEDICARE

## 2022-11-15 LAB
ALBUMIN SERPL-MCNC: 2.4 G/DL (ref 3.5–5)
ALBUMIN/GLOB SERPL: 0.6 {RATIO} (ref 1.1–2.2)
ALP SERPL-CCNC: 89 U/L (ref 45–117)
ALT SERPL-CCNC: 27 U/L (ref 12–78)
ANION GAP SERPL CALC-SCNC: 3 MMOL/L (ref 5–15)
APPEARANCE UR: CLEAR
AST SERPL-CCNC: 27 U/L (ref 15–37)
BACTERIA URNS QL MICRO: NEGATIVE /HPF
BILIRUB SERPL-MCNC: 0.4 MG/DL (ref 0.2–1)
BILIRUB UR QL: NEGATIVE
BUN SERPL-MCNC: 36 MG/DL (ref 6–20)
BUN/CREAT SERPL: 31 (ref 12–20)
CALCIUM SERPL-MCNC: 9.8 MG/DL (ref 8.5–10.1)
CHLORIDE SERPL-SCNC: 100 MMOL/L (ref 97–108)
CO2 SERPL-SCNC: 30 MMOL/L (ref 21–32)
COLOR UR: NORMAL
CREAT SERPL-MCNC: 1.17 MG/DL (ref 0.7–1.3)
EPITH CASTS URNS QL MICRO: NORMAL /LPF
ERYTHROCYTE [DISTWIDTH] IN BLOOD BY AUTOMATED COUNT: 12.9 % (ref 11.5–14.5)
GLOBULIN SER CALC-MCNC: 3.9 G/DL (ref 2–4)
GLUCOSE BLD STRIP.AUTO-MCNC: 224 MG/DL (ref 65–117)
GLUCOSE BLD STRIP.AUTO-MCNC: 225 MG/DL (ref 65–117)
GLUCOSE BLD STRIP.AUTO-MCNC: 234 MG/DL (ref 65–117)
GLUCOSE BLD STRIP.AUTO-MCNC: 283 MG/DL (ref 65–117)
GLUCOSE SERPL-MCNC: 249 MG/DL (ref 65–100)
GLUCOSE UR STRIP.AUTO-MCNC: NEGATIVE MG/DL
HCT VFR BLD AUTO: 37.5 % (ref 36.6–50.3)
HGB BLD-MCNC: 11.9 G/DL (ref 12.1–17)
HGB UR QL STRIP: NEGATIVE
HYALINE CASTS URNS QL MICRO: NORMAL /LPF (ref 0–2)
KETONES UR QL STRIP.AUTO: NEGATIVE MG/DL
LEUKOCYTE ESTERASE UR QL STRIP.AUTO: NEGATIVE
MCH RBC QN AUTO: 34.1 PG (ref 26–34)
MCHC RBC AUTO-ENTMCNC: 31.7 G/DL (ref 30–36.5)
MCV RBC AUTO: 107.4 FL (ref 80–99)
NITRITE UR QL STRIP.AUTO: NEGATIVE
NRBC # BLD: 0 K/UL (ref 0–0.01)
NRBC BLD-RTO: 0 PER 100 WBC
PH UR STRIP: 5.5 [PH] (ref 5–8)
PHOSPHATE SERPL-MCNC: 2.7 MG/DL (ref 2.6–4.7)
PLATELET # BLD AUTO: 217 K/UL (ref 150–400)
PMV BLD AUTO: 11.8 FL (ref 8.9–12.9)
POTASSIUM SERPL-SCNC: 4.6 MMOL/L (ref 3.5–5.1)
PROT SERPL-MCNC: 6.3 G/DL (ref 6.4–8.2)
PROT UR STRIP-MCNC: NEGATIVE MG/DL
RBC # BLD AUTO: 3.49 M/UL (ref 4.1–5.7)
RBC #/AREA URNS HPF: NORMAL /HPF (ref 0–5)
SERVICE CMNT-IMP: ABNORMAL
SODIUM SERPL-SCNC: 133 MMOL/L (ref 136–145)
SP GR UR REFRACTOMETRY: 1.01
UA: UC IF INDICATED,UAUC: NORMAL
UROBILINOGEN UR QL STRIP.AUTO: 0.2 EU/DL (ref 0.2–1)
WBC # BLD AUTO: 17.8 K/UL (ref 4.1–11.1)
WBC URNS QL MICRO: NORMAL /HPF (ref 0–4)

## 2022-11-15 PROCEDURE — 81001 URINALYSIS AUTO W/SCOPE: CPT

## 2022-11-15 PROCEDURE — 97530 THERAPEUTIC ACTIVITIES: CPT

## 2022-11-15 PROCEDURE — 82962 GLUCOSE BLOOD TEST: CPT

## 2022-11-15 PROCEDURE — 93970 EXTREMITY STUDY: CPT

## 2022-11-15 PROCEDURE — 74011250637 HC RX REV CODE- 250/637: Performed by: GENERAL ACUTE CARE HOSPITAL

## 2022-11-15 PROCEDURE — 36415 COLL VENOUS BLD VENIPUNCTURE: CPT

## 2022-11-15 PROCEDURE — 74011636637 HC RX REV CODE- 636/637: Performed by: GENERAL ACUTE CARE HOSPITAL

## 2022-11-15 PROCEDURE — 74011000258 HC RX REV CODE- 258: Performed by: INTERNAL MEDICINE

## 2022-11-15 PROCEDURE — 74011000250 HC RX REV CODE- 250: Performed by: ORTHOPAEDIC SURGERY

## 2022-11-15 PROCEDURE — 74011250636 HC RX REV CODE- 250/636: Performed by: INTERNAL MEDICINE

## 2022-11-15 PROCEDURE — 97116 GAIT TRAINING THERAPY: CPT | Performed by: PHYSICAL THERAPIST

## 2022-11-15 PROCEDURE — 65270000046 HC RM TELEMETRY

## 2022-11-15 PROCEDURE — 97535 SELF CARE MNGMENT TRAINING: CPT

## 2022-11-15 PROCEDURE — 71045 X-RAY EXAM CHEST 1 VIEW: CPT

## 2022-11-15 PROCEDURE — 74011250637 HC RX REV CODE- 250/637: Performed by: INTERNAL MEDICINE

## 2022-11-15 PROCEDURE — 97530 THERAPEUTIC ACTIVITIES: CPT | Performed by: PHYSICAL THERAPIST

## 2022-11-15 PROCEDURE — 85027 COMPLETE CBC AUTOMATED: CPT

## 2022-11-15 PROCEDURE — 74011250637 HC RX REV CODE- 250/637: Performed by: ORTHOPAEDIC SURGERY

## 2022-11-15 PROCEDURE — 74011636637 HC RX REV CODE- 636/637: Performed by: INTERNAL MEDICINE

## 2022-11-15 PROCEDURE — 80053 COMPREHEN METABOLIC PANEL: CPT

## 2022-11-15 PROCEDURE — 77010033678 HC OXYGEN DAILY

## 2022-11-15 PROCEDURE — 84100 ASSAY OF PHOSPHORUS: CPT

## 2022-11-15 RX ORDER — INSULIN GLARGINE 100 [IU]/ML
10 INJECTION, SOLUTION SUBCUTANEOUS
Status: DISCONTINUED | OUTPATIENT
Start: 2022-11-15 | End: 2022-11-17

## 2022-11-15 RX ADMIN — ASPIRIN 81 MG: 81 TABLET, COATED ORAL at 09:35

## 2022-11-15 RX ADMIN — METOPROLOL SUCCINATE 25 MG: 25 TABLET, FILM COATED, EXTENDED RELEASE ORAL at 09:35

## 2022-11-15 RX ADMIN — Medication 1 AMPULE: at 22:15

## 2022-11-15 RX ADMIN — FUROSEMIDE 60 MG: 40 TABLET ORAL at 09:35

## 2022-11-15 RX ADMIN — Medication 3 UNITS: at 18:19

## 2022-11-15 RX ADMIN — SODIUM CHLORIDE, PRESERVATIVE FREE 10 ML: 5 INJECTION INTRAVENOUS at 22:14

## 2022-11-15 RX ADMIN — SODIUM CHLORIDE, PRESERVATIVE FREE 10 ML: 5 INJECTION INTRAVENOUS at 15:38

## 2022-11-15 RX ADMIN — SENNOSIDES AND DOCUSATE SODIUM 2 TABLET: 50; 8.6 TABLET ORAL at 18:19

## 2022-11-15 RX ADMIN — Medication 5 UNITS: at 12:27

## 2022-11-15 RX ADMIN — SODIUM CHLORIDE, PRESERVATIVE FREE 10 ML: 5 INJECTION INTRAVENOUS at 06:07

## 2022-11-15 RX ADMIN — POTASSIUM CHLORIDE 10 MEQ: 750 TABLET, FILM COATED, EXTENDED RELEASE ORAL at 09:35

## 2022-11-15 RX ADMIN — POLYETHYLENE GLYCOL 3350 17 G: 17 POWDER, FOR SOLUTION ORAL at 09:33

## 2022-11-15 RX ADMIN — Medication 3 UNITS: at 08:54

## 2022-11-15 RX ADMIN — AMIODARONE HYDROCHLORIDE 400 MG: 200 TABLET ORAL at 22:12

## 2022-11-15 RX ADMIN — ACETAMINOPHEN 1000 MG: 500 TABLET ORAL at 06:07

## 2022-11-15 RX ADMIN — FAMOTIDINE 20 MG: 20 TABLET, FILM COATED ORAL at 09:36

## 2022-11-15 RX ADMIN — DULOXETINE 30 MG: 30 CAPSULE, DELAYED RELEASE ORAL at 09:35

## 2022-11-15 RX ADMIN — INSULIN GLARGINE 10 UNITS: 100 INJECTION, SOLUTION SUBCUTANEOUS at 22:13

## 2022-11-15 RX ADMIN — AMIODARONE HYDROCHLORIDE 0.5 MG/MIN: 50 INJECTION, SOLUTION INTRAVENOUS at 01:45

## 2022-11-15 RX ADMIN — ACETAMINOPHEN 1000 MG: 500 TABLET ORAL at 22:13

## 2022-11-15 RX ADMIN — AMIODARONE HYDROCHLORIDE 400 MG: 200 TABLET ORAL at 06:07

## 2022-11-15 RX ADMIN — Medication 1 AMPULE: at 09:39

## 2022-11-15 RX ADMIN — ASPIRIN 81 MG: 81 TABLET, COATED ORAL at 18:19

## 2022-11-15 RX ADMIN — ACETAMINOPHEN 1000 MG: 500 TABLET ORAL at 13:02

## 2022-11-15 RX ADMIN — AMIODARONE HYDROCHLORIDE 400 MG: 200 TABLET ORAL at 13:02

## 2022-11-15 RX ADMIN — NIACIN 1500 MG: 500 TABLET, EXTENDED RELEASE ORAL at 22:12

## 2022-11-15 RX ADMIN — FAMOTIDINE 20 MG: 20 TABLET, FILM COATED ORAL at 22:13

## 2022-11-15 RX ADMIN — SENNOSIDES AND DOCUSATE SODIUM 2 TABLET: 50; 8.6 TABLET ORAL at 09:36

## 2022-11-15 RX ADMIN — SACUBITRIL AND VALSARTAN 1 TABLET: 24; 26 TABLET, FILM COATED ORAL at 09:36

## 2022-11-15 NOTE — PROGRESS NOTES
ORTHO - Progress Note  Post Op day: 2 Days Post-Op    Anabella Gonzalez     [id number]  male    68 y.o.    1945    Admit date:2022  Date of Surgery:2022   Procedures:Procedure(s):  RIGHT HIP ARTHROPLASTY  Surgeon:Surgeon(s) and Role:     David Andrade MD - Primary        SUBJECTIVE:     Anabella Gonzalez is a 68 y.o. male resting in the bed. Patient has complaints of appropriate post-op pain. Mainly just taking tylenol for pain control mainly  Denies F/C, nausea, vomiting, dizziness, lightheadedness, chest pain, or shortness of breath. OBJECTIVE:       Physical Exam:  General: alert, cooperative, no distress. Gastrointestinal:  non-distended . Cardiovascular: equal pulses in the lower extremities,  Brisk cap refill in all distal extremities   Genitourinary: Voiding independently   Respiratory: No respiratory distress   Neurological:Neurovascular exam within normal limits. Senstion intact: LE bilat. Motor: + DF/PF/EHL. Musculoskeletal: Samantha's sign negative in bilateral lower extremities. Calves soft, supple, non-tender upon palpation or with passive stretch. No sign of DVT on exam.  Dressing/Wound:  Clean, dry and intact. No significant erythema or swelling. Vital Signs:       Patient Vitals for the past 8 hrs:   BP Temp Pulse Resp SpO2   11/15/22 1309 116/81 -- -- -- 94 %   11/15/22 1200 107/66 97.6 °F (36.4 °C) -- -- 96 %   11/15/22 1100 92/75 -- 72 21 --   11/15/22 1022 98/69 -- 72 -- (P) 96 %   11/15/22 1000 105/74 -- 80 20 --   11/15/22 0900 114/75 -- 70 -- 94 %   11/15/22 0800 (!) 92/58 97.7 °F (36.5 °C) 77 20 93 %   11/15/22 0700 97/65 -- 100 21 --                                          Temp (24hrs), Av.8 °F (36.6 °C), Min:97.5 °F (36.4 °C), Max:98.4 °F (36.9 °C)    Date 11/15/22 0700 - 22 0659   Shift [id number] 0180-0659 24 Hour Total   INTAKE   P.O. 250   250   I. V.(mL/kg/hr) 60   60   Shift Total(mL/kg) 310(2.8)   310(2.8)   OUTPUT Urine(mL/kg/hr) 940   940   Shift Total(mL/kg) 940(8.4)   940(8.4)   Weight (kg) 112 112 112 112     Labs:        Recent Labs     11/15/22  0407   HCT 37.5   HGB 11.9*     PT/OT:        Gait  Base of Support: Shift to left, Widened  Speed/Shelley: Delayed, Pace decreased (<100 feet/min), Shuffled, Slow  Step Length: Left shortened, Right shortened  Gait Abnormalities: Antalgic, Decreased step clearance (decreased weight shift to R wih constant L lateral lean but improved since last tx session)  Ambulation - Level of Assistance: Minimal assistance, Assist x2  Distance (ft): 3 Feet (ft)  Assistive Device: Walker, rolling, Gait belt     ASSESSMENT / PLAN:   Active Problems:    Right hip pain (11/9/2022)      Closed right hip fracture ([address] (11/9/2022)         -  Continue PT/OT WBAT  -  DVT prophylaxis- SCD w/ ASA 81 mg BID   -  DC planning - pending    Signed By: Claudetta Rad, PA

## 2022-11-15 NOTE — PROGRESS NOTES
0700 - 0900 - Bedside report received from Flaco Burnett, [facility]. Patient A & O x 4 with bouts of confusion. Patient cooperative, vs stable, av paced on tele, minimal complaints of pain. Patient complaing of full bladder. Straight cathed pt. Pt up to chair with pt/ot 2 assist sat up for 2 hrs   1200 - Reassessment completed. Details documented in chart. -     Pt assisted with self cath. 1600 - Reassessment completed. Details documented in chart. 1800 -  Order for doppler placed for pt per hospitalist. UA and Urine culture sent per order. Chest xray completed per order. Pt up again with nursing for dinner. Bedside and Verbal shift change report given to TXDESTINEY Mitchell RN (oncoming nurse) by Balbir Beard RN (offgoing nurse). Report included the following information SBAR, Kardex, Intake/Output, MAR, Recent Results, and Med Rec Status.

## 2022-11-15 NOTE — ANESTHESIA POSTPROCEDURE EVALUATION
Procedure(s):  RIGHT HIP ARTHROPLASTY TOTAL ANTERIOR APPROACH. general    Anesthesia Post Evaluation        Patient location during evaluation: PACU  Note status: Adequate. Level of consciousness: responsive to verbal stimuli and sleepy but conscious  Pain management: satisfactory to patient  Airway patency: patent  Anesthetic complications: no  Cardiovascular status: acceptable  Respiratory status: acceptable  Hydration status: acceptable  Comments: +Post-Anesthesia Evaluation and Assessment    Patient: Maikel Bianchi MRN: [id number]  SSN: [id number]   YOB: 1945  Age: 68 y.o. Sex: male      Cardiovascular Function/Vital Signs    BP (!) 92/58 (BP 1 Location: Right arm, BP Patient Position: At rest)   Pulse 77   Temp 36.5 °C (97.7 °F)   Resp 20   Ht 6' 6\" (1.981 m)   Wt 112 kg (246 lb 14.6 oz)   SpO2 93%   BMI 28.53 kg/m²     Patient is status post Procedure(s):  RIGHT HIP ARTHROPLASTY TOTAL ANTERIOR APPROACH. Nausea/Vomiting: Controlled. Postoperative hydration reviewed and adequate. Pain:  Pain Scale 1: Numeric (0 - 10) (11/15/22 0800)  Pain Intensity 1: 4 (11/15/22 0800)   Managed. Neurological Status:   Neuro (WDL): Exceptions to WDL (11/13/22 1145)   At baseline. Mental Status and Level of Consciousness: Arousable. Pulmonary Status:   O2 Device: None (Room air) (11/15/22 0850)   Adequate oxygenation and airway patent. Complications related to anesthesia: Patient with ectopy and runs of V-Tach with severe hypotension requiring vasopressors after induction of anesthesia. Case deferred, central line placed, and patient sent to ICU for further monitoring and care. Post-anesthesia assessment completed.      Signed By: Nhan Cunha MD    11/15/2022  Post anesthesia nausea and vomiting:  controlled      INITIAL Post-op Vital signs:   Vitals Value Taken Time   BP 92/58 11/15/22 0800   Temp 36.5 °C (97.7 °F) 11/15/22 0800   Pulse 100 11/15/22 0822   Resp 19 11/15/22 0822   SpO2 92 % 11/15/22 0822   Vitals shown include unvalidated device data.

## 2022-11-15 NOTE — PROGRESS NOTES
Problem: Self Care Deficits Care Plan (Adult)  Goal: *Acute Goals and Plan of Care (Insert Text)  Description: FUNCTIONAL STATUS PRIOR TO ADMISSION: Patient is a questionable historian. Per report, uses 6 ft walking stick in the home and RW when outside the home. Also reports being in the process of obtaining power wheelchair. Patient with history of falls. HOME SUPPORT: Per report, patient requires spouse assistance for lower body ADLs and bathing (min-mod A per patient description). Occupational Therapy Goals  Initiated 11/14/2022  1. Patient will perform lower body ADLs with AE and moderate assistance within 7 day(s). 2.  Patient will perform seated upper body ADLs with supervision/set-up within 7 day(s). 3.  Patient will tolerate static standing with RW support and moderate assistance in prep for standing ADLs within 7 day(s). 4.  Patient will perform toilet transfer to [facility] with moderate assistance within 7 day(s). 5.  Patient will perform all aspects of toileting with moderate assistance within 7 day(s). 6.  Patient will participate in upper extremity therapeutic exercise/activities with supervision/set-up for 10 minutes within 7 day(s). 7.  Patient will utilize energy conservation and fall prevention techniques during functional activities without cues within 7 day(s). 11/15/2022 1342 by LUIS Carl/L  Outcome: Progressing Towards Goal  OCCUPATIONAL THERAPY TREATMENT  Patient: Jessica Gilliam (20 y.o. male)  Date: 11/15/2022  Diagnosis: Right hip pain [M25.551]  Closed right hip fracture ([facility] [address] [S72.001A] <principal problem not specified>  Procedure(s) (LRB):  RIGHT HIP ARTHROPLASTY (Right) 2 Days Post-Op  Precautions:  WBAT R LE, falls, chair alarm  Chart, occupational therapy assessment, plan of care, and goals were reviewed. ASSESSMENT  Patient continues with skilled OT services and is progressing towards goals.   This patient progressed today to OOB to chair with increased time required for any and all tasks. He requires min-mod A x 2 for supine to sit to sit to stand with heavy ws to L in standing. He requires cues for hand placement/safety and sequencing some tasks. He is D for LB dressing at this time, did complete seated grooming EOB as stated below with SBA to S/U. He exhibits no c/o with mobilizing OOB other than some soreness. He will likely benefit from rehab at discharge as he is slowly progressing with OT at this time--will continue to assess as indicated. Vitals:    11/15/22 1022 11/15/22 1100 11/15/22 1200 11/15/22 1309   BP: 98/69 92/75 107/66 116/81   BP 1 Location: (P) Right upper arm  Right arm    BP Patient Position: (P) Sitting  At rest    Pulse: 72 72     Temp:   97.6 °F (36.4 °C)    Resp:  21     Height:       Weight:       SpO2: (P) 96%  96% 94%        Current Level of Function Impacting Discharge (ADLs): D to SBA--see below    Other factors to consider for discharge: balance deficits, cognitive deficits at this time         PLAN :  Patient continues to benefit from skilled intervention to address the above impairments. Continue treatment per established plan of care to address goals. Recommendation for discharge: (in order for the patient to meet his/her long term goals)  To be determined: rehab likely recommended --TBD    This discharge recommendation:  Has been made in collaboration with the attending provider and/or case management    IF patient discharges home will need the following DME: need to verify with wife       SUBJECTIVE:   Patient stated I am mostly sore, not in pain.     OBJECTIVE DATA SUMMARY:   Cognitive/Behavioral Status:  Neurologic State: Alert  Orientation Level: Oriented X4  Cognition: Follows commands             Functional Mobility and Transfers for ADLs:  Bed Mobility:  Supine to Sit: Assist x2; Moderate assistance;Bed Modified; Additional time; Adaptive equipment  Scooting: Contact guard assistance;Stand-by assistance    Transfers:  Sit to Stand: Assist x2;Minimum assistance; Moderate assistance; Additional time; Adaptive equipment     Bed to Chair: Assist x2;Minimum assistance; Moderate assistance; Additional time; Adaptive equipment    Balance:  Sitting: Impaired; Without support  Sitting - Static: Good (unsupported)  Sitting - Dynamic: Fair (occasional)  Standing: Impaired; With support (exhibits L list)  Standing - Static: Fair;Constant support  Standing - Dynamic : Poor;Constant support (unabl e to remove handsfor functional tasks)    ADL Intervention:       Grooming  Position Performed: Seated in chair  Washing Face: Stand-by assistance;Set-up  Washing Hands: Stand-by assistance;Set-up                        Lower Body Dressing Assistance  Socks: Total assistance (dependent)    Toileting  Bladder Hygiene: Total assistance (dependent)           Pain:  3/10, soreness    Activity Tolerance:   Fair, requires rest breaks, requires frequent rest breaks, and max increased time    After treatment patient left in no apparent distress:   Sitting in chair, Call bell within reach, and Bed / chair alarm activated    COMMUNICATION/COLLABORATION:   The patients plan of care was discussed with: Physical therapist and Registered nurse.      LUIS Capps/L  Time Calculation: 25 mins

## 2022-11-15 NOTE — PROGRESS NOTES
1900: Bedside shift change report given to Colby Beltran (oncoming nurse) by Micky Hassan (offgoing nurse). Report included the following information SBAR, Kardex, Intake/Output, MAR, Recent Results, and Cardiac Rhythm AV paced . 2000: Shift assessment complete. Patient AOx4 with periodic confusion. Lung sounds diminished bilaterally; on 3LNC. Pulses palpable and present in BUE and LLE. RLE 2/3+ swelling, post-tibial and pedal pulses present with doppler. Right hip incision dressing clean, dry, and intact. See Flowsheets and MAR for more details. 2115: RN received phone call from patient's wife. She is concerned about his periodic confusion. Wife updated on patient and plan of care via telephone with patient's permission. 2242: Patient has not voided. He states that he straight caths at home. Bladder scan showed 862cc. ELVIN Mercer notified via Seake[employer]. 2307: Orders received to straight cath. 2325: Straight cath performed using sterile technique. 925cc urine output. Post cath bladder scan showed 0cc remaining.     9784: Patient placed on room air. O2 sat 92-95%. 0000: Reassessment complete. No change to previous assessment. See Flowsheets and MAR for more details. 0400: Reassessment complete. No change to previous assessment. See Flowsheets and MAR for more details. 0700: Bedside shift change report given to Yusra Aldridge (oncoming nurse) by Colby Beltran (offgoing nurse). Report included the following information SBAR, Kardex, Intake/Output, MAR, Recent Results, and Cardiac Rhythm AV paced .

## 2022-11-15 NOTE — PROGRESS NOTES
Progress Note      11/15/2022 2:43 PM  NAME: Coleen Hsieh   MRN:  [id number]   Admit Diagnosis: Right hip pain [M25.551]  Closed right hip fracture [facility]) [S72.001A]     Primary Cardiologist: Follows at [place]  Physician Requesting consult: Dr Smith Merrill     Assessment:       NSTEMI, mild trop elevation  Fall with hip fracture   HTN  Chronic systolic heart failure  Remote ICD  Sharlot Eng at the [place]    Cath on 11/10/2022 by Dr Karmen Ramos showed no CAD  VF during coronary injection, s/p Shock  (initial ICD shock failed ?)   NSVT and frequent PVCs while undergoing Hip surgery - hip surgery aborted   Low BP when induced for hip surgery   Started on Amio gtt on 11/11  S/p hip surgery on 11/13    Per Dr. Abhishek Fraser   \"Ventricular fibrillation during injection of the left coronary system during cath 11/10. His BIV-ICD successfully sensed this and the successfully charged and then shocked this to sinus with 31J energy. He had early recurrence of VF which may have been triggered by PVC early after BIV pacing post-shock which led to prompt external shock. There was no internal shock failure. Regarding the initial VF during cath, this may have been precipitated by quick contrast-induced changes, decreased oxygenation in the microvasculature during injection, pressure stretch of myocardium with injection, transient QT dispersion, etc.  In any count, this has been described before in certain patients. It was NOT due to R-on-T mechanism or device-triggered AT FIRST. The second episode of recurrent VF may have been R-on-T related phenomenon after review of the strips as described above. He was rescued by external defibrillation for the second episode after a few seconds but likely would have done by internal if had not subsided on its own. \"       Recommendations:    Amio gtt changed to 400mg po TID. Discharge on 400mg daily. Discussed with Dr. Rosalina Gil @ the [place]. May need DFTs in a month or so.   Keep K >4, Mg >2   Cont metoprolol   Entresto resumed  Dispo per primary  Will be available as needed  Follow up with the [place] soon after discharge             [x]        High complexity decision making was performed    Subjective:     HPI:  S/p surgery         Objective:      Physical Exam:    Last 24hrs VS reviewed since prior progress note. Most recent are:    Visit Vitals  BP 92/75   Pulse 72   Temp 97.7 °F (36.5 °C)   Resp 21   Ht 6' 6\" (1.981 m)   Wt 112 kg (246 lb 14.6 oz)   SpO2 (P) 96%   BMI 28.53 kg/m²       Intake/Output Summary (Last 24 hours) at 11/15/2022 1124  Last data filed at 11/15/2022 0900  Gross per 24 hour   Intake 357.67 ml   Output 1494 ml   Net -1136.33 ml           General: sleeping, no acute distress   Neck: Supple   Respiratory: No respiratory distress, clear lung sound   Cardiovascular: Regular rate rhythm, S1S2, no murmur   Abdomen: soft, non tender, non distended   Neuro: moves all extremities, oriented x3   Skin: warm and dry   Extremity: no edema, warm to touch      Data Review    Telemetry: Paced Rhythm      Lab Data Personally Reviewed:    Recent Labs     11/15/22  0407 11/14/22  0358 11/13/22  0443   WBC 17.8*  --  12.0*   HGB 11.9* 12.1 13.5   HCT 37.5  --  40.7     --  195       No results for input(s): INR, PTP, APTT, INREXT, INREXT in the last 72 hours. Recent Labs     11/15/22  0407 11/14/22  0358 11/13/22  0443   * 134* 133*   K 4.6 4.6 4.3    100 99   CO2 30 29 30   BUN 36* 29* 24*   CREA 1.17 1.23 1.06   * 255* 173*   CA 9.8 9.5 9.5       No results for input(s): CPK, CKNDX, TROIQ in the last 72 hours.     No lab exists for component: CPKMB  Lab Results   Component Value Date/Time    Cholesterol, total 142 03/05/2010 02:25 PM    HDL Cholesterol 47 03/05/2010 02:25 PM    LDL, calculated 82.4 03/05/2010 02:25 PM    Triglyceride 63 03/05/2010 02:25 PM    CHOL/HDL Ratio 3.0 03/05/2010 02:25 PM       Recent Labs     11/15/22  0407 11/13/22  0443   AP 89 103   TP 6.3* 6. 8   ALB 2.4* 2.3*   GLOB 3.9 4.5*       No results for input(s): PH, PCO2, PO2 in the last 72 hours.     Medications Personally Reviewed:    Current Facility-Administered Medications   Medication Dose Route Frequency    benzocaine-menthoL (CHLORASEPTIC MAX) lozenge 1 Lozenge  1 Lozenge Oral Q2H PRN    amiodarone (CORDARONE) tablet 400 mg  400 mg Oral Q8H    insulin lispro (HUMALOG) injection   SubCUTAneous TIDAC    glucose chewable tablet 16 g  4 Tablet Oral PRN    glucagon (GLUCAGEN) injection 1 mg  1 mg IntraMUSCular PRN    dextrose 10 % infusion 0-250 mL  0-250 mL IntraVENous PRN    famotidine (PEPCID) tablet 20 mg  20 mg Oral Q12H    alcohol 62% (NOZIN) nasal  1 Ampule  1 Ampule Topical Q12H    [Held by provider] heparin (porcine) injection 5,000 Units  5,000 Units SubCUTAneous Q8H    sacubitriL-valsartan (ENTRESTO) 24-26 mg tablet 1 Tablet  1 Tablet Oral BID    acetaminophen (TYLENOL) tablet 1,000 mg  1,000 mg Oral Q8H    polyethylene glycol (MIRALAX) packet 17 g  17 g Oral DAILY    bisacodyL (DULCOLAX) suppository 10 mg  10 mg Rectal DAILY PRN    aspirin delayed-release tablet 81 mg  81 mg Oral BID    NOREPINephrine (LEVOPHED) 8 mg in 5% dextrose 250mL (32 mcg/mL) infusion  0.5-16 mcg/min IntraVENous TITRATE    calcium carbonate (TUMS) chewable tablet 200 mg [elemental]  200 mg Oral PRN    lidocaine 4 % patch 1 Patch  1 Patch TransDERmal Q24H    DULoxetine (CYMBALTA) capsule 30 mg  30 mg Oral DAILY    furosemide (LASIX) tablet 60 mg  60 mg Oral DAILY    metoprolol succinate (TOPROL-XL) XL tablet 25 mg  25 mg Oral DAILY    niacin ER (NIASPAN) tablet 1,500 mg  1,500 mg Oral QHS    potassium chloride SR (KLOR-CON 10) tablet 10 mEq  10 mEq Oral DAILY    sodium chloride (NS) flush 5-40 mL  5-40 mL IntraVENous Q8H    sodium chloride (NS) flush 5-40 mL  5-40 mL IntraVENous PRN    polyethylene glycol (MIRALAX) packet 17 g  17 g Oral DAILY PRN    ondansetron (ZOFRAN ODT) tablet 4 mg  4 mg Oral Q8H PRN    Or ondansetron (ZOFRAN) injection 4 mg  4 mg IntraVENous Q6H PRN    oxyCODONE IR (ROXICODONE) tablet 5 mg  5 mg Oral Q4H PRN    morphine injection 2 mg  2 mg IntraVENous Q4H PRN    naloxone (NARCAN) injection 0.4 mg  0.4 mg IntraVENous PRN    senna-docusate (PERICOLACE) 8.6-50 mg per tablet 2 Tablet  2 Tablet Oral BID              Tickfaw Rafael III, DO

## 2022-11-15 NOTE — PROGRESS NOTES
Problem: Mobility Impaired (Adult and Pediatric)  Goal: *Acute Goals and Plan of Care (Insert Text)  Description: FUNCTIONAL STATUS PRIOR TO ADMISSION: Patient poor historian providing inconsistent reports of PLOF. Appears he was ambulating in home using walking stick? ??, but has RW. Attempting to obtain scooter for community mobility. HOME SUPPORT PRIOR TO ADMISSION: The patient lived with family and required assistance for ADLs. Physical Therapy Goals  Initiated 11/14/2022  1. Patient will move from supine to sit and sit to supine , scoot up and down, and roll side to side in bed with minimal assistance/contact guard assist within 7 day(s). 2.  Patient will transfer from bed to chair and chair to bed with moderate assistance  using the least restrictive device within 7 day(s). 3.  Patient will perform sit to stand with moderate assistance  within 7 day(s). 4.  Patient will ambulate with moderate assistance  for 10 feet with the least restrictive device within 7 day(s). 5.  Patient will ascend/descend 4 stairs with 2 handrail(s) with moderate assistance  within 7 day(s). Outcome: Progressing Towards Goal   PHYSICAL THERAPY TREATMENT  Patient: Gene Raphael (9 y.o. male)  Date: 11/15/2022  Diagnosis: Right hip pain [M25.551]  Closed right hip fracture ([address] [S72.001A] <principal problem not specified>  Procedure(s) (LRB):  RIGHT HIP ARTHROPLASTY (Right) 2 Days Post-Op  Precautions:    Chart, physical therapy assessment, plan of care and goals were reviewed. ASSESSMENT  Patient continues with skilled PT services and is slowly progressing towards goals. Patient appears less confused today with improved active participation in therapy today. He requires mod A of 2 to transition supine to sit but is able to scoot to EOB with CGA. He requires min/mod A of 2 for sit<>stand transfers with improved static standing balance noted.  Patient continues to demonstrate increased weight shift to L but improved ability to advance LEs in standing. Patient able to take a few steps bed to chair using RW today requiring additional time and min A of 2. Remained in chair at end of session. Continue to recommend rehab following discharge. Current Level of Function Impacting Discharge (mobility/balance): min to min/mod A of 2           PLAN :  Patient continues to benefit from skilled intervention to address the above impairments. Continue treatment per established plan of care. to address goals. Recommendation for discharge: (in order for the patient to meet his/her long term goals)  rehab    This discharge recommendation:  Has been made in collaboration with the attending provider and/or case management    IF patient discharges home will need the following DME: to be determined (TBD)       SUBJECTIVE:   Patient stated I think walking forward is better than side stepping.     OBJECTIVE DATA SUMMARY:   Critical Behavior:  Neurologic State: Alert  Orientation Level: Oriented X4  Cognition: Follows commands  Safety/Judgement: Decreased insight into deficits, Decreased awareness of need for safety, Decreased awareness of need for assistance, Fall prevention  Functional Mobility Training:  Bed Mobility:     Supine to Sit: Assist x2; Moderate assistance;Bed Modified; Additional time; Adaptive equipment     Scooting: Contact guard assistance;Stand-by assistance        Transfers:  Sit to Stand: Assist x2;Minimum assistance; Moderate assistance; Additional time; Adaptive equipment  Stand to Sit: Assist x2;Minimum assistance; Additional time; Adaptive equipment        Bed to Chair: Assist x2;Minimum assistance; Moderate assistance; Additional time; Adaptive equipment                    Balance:  Sitting: Impaired; Without support  Sitting - Static: Good (unsupported)  Sitting - Dynamic: Fair (occasional)  Standing: Impaired; With support (exhibits L list)  Standing - Static: Fair;Constant support  Standing - Dynamic : Poor;Constant support (unabl e to remove handsfor functional tasks)  Ambulation/Gait Training:  Distance (ft): 3 Feet (ft)  Assistive Device: Walker, rolling;Gait belt  Ambulation - Level of Assistance: Minimal assistance;Assist x2        Gait Abnormalities: Antalgic;Decreased step clearance (decreased weight shift to R wih constant L lateral lean but improved since last tx session)  Right Side Weight Bearing: As tolerated     Base of Support: Shift to left; Widened     Speed/Shelley: Delayed;Pace decreased (<100 feet/min); Shuffled; Slow  Step Length: Left shortened;Right shortened         Gait is unsteady but no overt knee buckling or LOB noted         Therapeutic Exercises:   Patient performing ankle pumps, heel slides, quad sets and seated LAQ x 5 reps each      Activity Tolerance:   Fair    After treatment patient left in no apparent distress:   Sitting in chair, Call bell within reach, and Bed / chair alarm activated    COMMUNICATION/COLLABORATION:   The patients plan of care was discussed with: Occupational therapist and Registered nurse.      Luther Tiwari, PT   Time Calculation: 30 mins

## 2022-11-15 NOTE — PROGRESS NOTES
Hospitalist Progress Note    NAME: Kenya Meade   :  1945   MRN:  [id number]       Assessment / Plan:    Recurrent non sustained V tach. -Hip surgery deferred due to dysrhythmia after intubation.  -Continue amiodarone, switch to  TID, on DC lower dose to 400 mg daily  [facility]  -S/p LHC on 11/10 was unremarkable. -EP consult appreciated. Right hip Fx.  -He was going to get hip surgery but canceled after intubation due to ongoing dysrhythmia.  -s/p Right total hip arthroplasty for femoral neck fracture on   -PT OT recs SNF     Chronic systolic HF  Continue diuresis as tolerated. Resume entresto      DVT prophylaxis: [address]  SUP: Pepcid. Code status: Full code. 25.0 - 29.9 Overweight / Body mass index is 28.28 kg/m². Recommended Disposition: SAH/Rehab  Anticipated Discharge Date:  48 hours        Subjective:     Discussed with RN events overnight. No specific complaints   On 2 L/M    Review of Systems:  Symptom Y/N Comments  Symptom Y/N Comments   Fever/Chills    Chest Pain     Poor Appetite    Edema     Cough    Abdominal Pain     Sputum    Joint Pain     SOB/SOLARES    Pruritis/Rash     Nausea/vomit    Tolerating PT/OT     Diarrhea    Tolerating Diet     Constipation    Other       PO intake: No data found. Wt Readings from Last 10 Encounters:   22 111 kg (244 lb 11.4 oz)   12/10/21 109.4 kg (241 lb 2.9 oz)   21 103.4 kg (228 lb)   21 108 kg (238 lb)   17 112.9 kg (249 lb)   06/29/10 105.1 kg (231 lb 9.6 oz)   06/24/10 105.2 kg (232 lb)   06/22/10 105.7 kg (233 lb)   06/15/10 104.5 kg (230 lb 6.4 oz)   06/10/10 104.8 kg (231 lb 2 oz)       Objective:     VITALS:   Last 24hrs VS reviewed since prior progress note.  Most recent are:  Patient Vitals for the past 24 hrs:   Temp Pulse Resp BP SpO2   11/15/22 0200 -- 70 23 -- 98 %   11/15/22 0000 97.7 °F (36.5 °C) 70 21 110/78 96 %   11/14/22 2228 -- 74 -- 129/82 --   11/14/22 2200 -- 71 22 112/86 98 %   11/14/22 2000 97.5 °F (36.4 °C) 95 20 117/84 94 %   11/14/22 1816 -- 88 18 107/82 98 %   11/14/22 1705 -- 70 26 111/75 92 %   11/14/22 1604 98.4 °F (36.9 °C) 70 19 115/74 94 %   11/14/22 1512 -- -- -- -- 96 %   11/14/22 1435 -- 70 21 101/70 95 %   11/14/22 1305 -- 70 23 104/71 92 %   11/14/22 1200 97 °F (36.1 °C) 74 18 121/80 98 %   11/14/22 1147 -- 71 -- 101/65 94 %   11/14/22 1135 -- 71 -- 95/67 94 %   11/14/22 1100 -- 85 14 -- 95 %   11/14/22 1041 -- 77 18 106/67 96 %   11/14/22 1000 -- 75 19 90/61 95 %   11/14/22 0900 -- 96 18 (!) 118/93 92 %   11/14/22 0808 -- -- -- -- 99 %   11/14/22 0801 97.7 °F (36.5 °C) 87 18 108/76 97 %   11/14/22 0700 -- 92 18 100/69 96 %   11/14/22 0400 97.5 °F (36.4 °C) 85 21 114/84 97 %         Intake/Output Summary (Last 24 hours) at 11/15/2022 0306  Last data filed at 11/15/2022 0000  Gross per 24 hour   Intake 1028.67 ml   Output 1324 ml   Net -295.33 ml          I had a face to face encounter, and independently examined this patient as outlined below:    PHYSICAL EXAM:  General:    No distress     HEENT: Atraumatic, anicteric sclerae, pink conjunctivae, MMM  Neck:  Supple, symmetrical  Lungs:   CTA. No Wheezing/Rhonchi. No rales. No tenderness. No Accessory muscle use. CVS:   Regular rhythm. No murmur. No JVD   GI/:   Soft. NT. ND. BS normal  Extremities: No edema. No cyanosis. No clubbing. Dressing intact over thigh, ecchymosis  Skin:     Not pale. Not Jaundiced. No rashes   Psych:  Poor insight. Not depressed. Not anxious or agitated. Neurologic: Alert and oriented X 4. EOMs intact. No facial asymmetry. No slurred speech. Symmetrical strength, Sensation grossly intact. Labs     I reviewed today's most current labs and imaging studies.   Pertinent labs include:  Recent Labs     11/14/22  0358 11/13/22  0443 11/12/22  0440   WBC  --  12.0* 11.8*   HGB 12.1 13.5 12.4   HCT --  40.7 36.3*   PLT  --  195 170       Recent Labs     11/14/22  0358 11/13/22  0443 11/12/22  0440   * 133* 135*   K 4.6 4.3 3.5    99 99   CO2 29 30 30   * 173* 191*   BUN 29* 24* 25*   CREA 1.23 1.06 1.03   CA 9.5 9.5 8.7   MG  --   --  2.3   PHOS 3.0 2.4* 1.7*   ALB  --  2.3*  --    TBILI  --  0.9  --    ALT  --  25  --        XR PELV AP ONLY    Result Date: 11/14/2022  1. No complicating features post right hip replacement     XR HIP RT W OR WO PELV 2-3 VWS    Result Date: 11/9/2022  Questionable nondisplaced right femoral neck fracture. XR HIP RT DURING OR PROC    Result Date: 11/13/2022  Interval right THR. CT HEAD WO CONT    Result Date: 11/9/2022  No acute intracranial finding. CT PELV WO CONT    Result Date: 11/9/2022  Mildly impacted subcapital right femoral neck fracture. Mild right hip osteoarthritis. XR CHEST PORT    Result Date: 11/12/2022  Mild edema pattern is slightly improved. Improving hazy left basilar atelectasis/airspace disease. Small left pleural effusion is likely unchanged. No definite pneumothorax. XR CHEST PORT    Result Date: 11/10/2022  1. ET tube and right IJ central line in appropriate position. 2. Shallow volumes, bibasilar atelectasis. 3. Pulmonary vascular congestion, probable interstitial edema. XR CHEST PORT    Result Date: 11/9/2022  No Acute Disease. XR PELV AP ONLY    Result Date: 11/14/2022  1.  No complicating features post right hip replacement       All Micro Results       None              Current Medications:     Current Facility-Administered Medications:     benzocaine-menthoL (CHLORASEPTIC MAX) lozenge 1 Lozenge, 1 Lozenge, Oral, Q2H PRN, Javier Mercer PA-C, 1 Lozenge at 11/14/22 2839    amiodarone (CORDARONE) tablet 400 mg, 400 mg, Oral, Q8H, Hermilo Horne III, DO, 400 mg at 11/14/22 2228    insulin lispro (HUMALOG) injection, , SubCUTAneous, Cathleen Amanda, Lashay De Los Santos MD, 5 Units at 11/14/22 1627    glucose chewable tablet 16 g, 4 Tablet, Oral, PRN, Lashay De Los Santos MD    glucagon (GLUCAGEN) injection 1 mg, 1 mg, IntraMUSCular, PRN, Lashay De Los Santos MD    dextrose 10 % infusion 0-250 mL, 0-250 mL, IntraVENous, PRN, Lashay De Los Santos MD    famotidine (PEPCID) tablet 20 mg, 20 mg, Oral, Q12H, Vanesa Lugo MD, 20 mg at 11/14/22 2032    alcohol 62% (NOZIN) nasal  1 Ampule, 1 Ampule, Topical, Q12H, Flower Amin MD, 1 Ampule at 11/14/22 2032    [Held by provider] heparin (porcine) injection 5,000 Units, 5,000 Units, SubCUTAneous, Q8H, Flower Amin MD    sacubitriL-valsartan (ENTRESTO) 24-26 mg tablet 1 Tablet, 1 Tablet, Oral, BID, Flower Amin MD, 1 Tablet at 11/14/22 1829    acetaminophen (TYLENOL) tablet 1,000 mg, 1,000 mg, Oral, Q8H, Flower Amin MD, 1,000 mg at 11/14/22 2227    polyethylene glycol ([employer]) packet 17 g, 17 g, Oral, DAILY, Flower mAin MD, 17 g at 11/14/22 5689    bisacodyL (DULCOLAX) suppository 10 mg, 10 mg, Rectal, DAILY PRN, Flower Amin MD    aspirin delayed-release tablet 81 mg, 81 mg, Oral, BID, Flower Amin MD, 81 mg at 11/14/22 1814    NOREPINephrine (LEVOPHED) 8 mg in 5% dextrose 250mL (32 mcg/mL) infusion, 0.5-16 mcg/min, IntraVENous, TITRATE, Speedy Yepez MD, Stopped at 11/13/22 1219    calcium carbonate (TUMS) chewable tablet 200 mg [elemental], 200 mg, Oral, PRN, Flower Amin MD, 200 mg at 11/12/22 1158    lidocaine 4 % patch 1 Patch, 1 Patch, TransDERmal, Q24H, Flower Amin MD, 1 Patch at 11/14/22 1502    amiodarone (CORDARONE) 375 mg/250 mL D5W infusion, 0.5-1 mg/min, IntraVENous, TITRATE, Hermilo Horne H III, DO, Last Rate: 20 mL/hr at 11/15/22 0145, 0.5 mg/min at 11/15/22 0145    DULoxetine (CYMBALTA) capsule 30 mg, 30 mg, Oral, DAILY, Flower Amin MD, 30 mg at 11/14/22 0942    furosemide (LASIX) tablet 60 mg, 60 mg, Oral, DAILY, Flower Amin MD, 60 mg at 11/14/22 0940    metoprolol succinate (TOPROL-XL) XL tablet 25 mg, 25 mg, Oral, DAILY, Tulio Ackerman MD, 25 mg at 11/14/22 0940    niacin ER (NIASPAN) tablet 1,500 mg, 1,500 mg, Oral, QHS, Tulio Ackerman MD, 1,500 mg at 11/14/22 2228    potassium chloride SR (KLOR-CON 10) tablet 10 mEq, 10 mEq, Oral, DAILY, Tulio Ackerman MD, 10 mEq at 11/14/22 0939    sodium chloride (NS) flush 5-40 mL, 5-40 mL, IntraVENous, Q8H, Tulio Ackerman MD, 10 mL at 11/14/22 2240    sodium chloride (NS) flush 5-40 mL, 5-40 mL, IntraVENous, PRN, Tulio Ackerman MD, 10 mL at 11/10/22 1220    polyethylene glycol (MIRALAX) packet 17 g, 17 g, Oral, DAILY PRN, Tulio Ackerman MD    ondansetron (ZOFRAN ODT) tablet 4 mg, 4 mg, Oral, Q8H PRN **OR** ondansetron (ZOFRAN) injection 4 mg, 4 mg, IntraVENous, Q6H PRN, Tulio Ackerman MD    oxyCODONE IR (ROXICODONE) tablet 5 mg, 5 mg, Oral, Q4H PRN, Tulio Ackerman MD, 5 mg at 11/12/22 1717    morphine injection 2 mg, 2 mg, IntraVENous, Q4H PRN, Tulio Ackerman MD, 2 mg at 11/12/22 0226    naloxone [facility]) injection 0.4 mg, 0.4 mg, IntraVENous, PRN, Tulio Ackerman MD    senna-docusate (Jose Larve) 8.6-50 mg per tablet 2 Tablet, 2 Tablet, Oral, BID, Tulio Ackerman MD, 2 Tablet at 11/14/22 1814     Procedures: see electronic medical records for all procedures/Xrays and details which were not copied into this note but were reviewed prior to creation of Plan. Reviewed most current lab test results and cultures  YES  Reviewed most current radiology test results   YES  Review and summation of old records today    NO  Reviewed patient's current orders and MAR    YES  PMH/SH reviewed - no change compared to H&P  ________________________________________________________________________  Care Plan discussed with:    Comments   Patient x    Family      RN x    Care Manager     Consultant                        Multidiciplinary team rounds were held today with , nursing, pharmacist and clinical coordinator.   Patient's plan of care was discussed; medications were reviewed and discharge planning was addressed.      ________________________________________________________________________  Total NON critical care TIME:   33  Minutes    Total CRITICAL CARE TIME Spent:   Minutes non procedure based      Comments   >50% of visit spent in counseling and coordination of care x     This includes time during multidisciplinary rounds if indicated above   ________________________________________________________________________  Burnard Goltz, MD

## 2022-11-15 NOTE — PROGRESS NOTES
Hospitalist Progress Note    NAME: Lavinia Cornelius   :  1945   MRN:  [id number]       Assessment / Plan:    Recurrent non sustained V tach. -Hip surgery deferred due to dysrhythmia after intubation.  -Continue amiodarone, switch drip to  TID, on DC lower dose to 400 mg daily  -[facility]  -S/p LHC on 11/10 was unremarkable. -EP consult appreciated. Pt may needs DFT in a month  -Needs to f[facility] with [facility] after DC     Right hip Fx.  -He was going to get hip surgery but canceled after intubation due to ongoing dysrhythmia.  -s/p Right total hip arthroplasty for femoral neck fracture on   -PT OT recs SNF  -Asa BID for DVT ppx     Chronic systolic HF  Continue diuresis as tolerated. Resume entresto      ? New onset DM  Check A1c  Lantus  ISS  DM mngt consult     Leukocytosis  Likely reactive  Repeat CBC in AM  Check CXR and UA    DVT prophylaxis: Asa BID/SCDs  SUP: Pepcid. Code status: Full code. 25.0 - 29.9 Overweight / Body mass index is 28.53 kg/m². Recommended Disposition: [facility]/Rehab  Anticipated Discharge Date:  24 hours , needs DM education, rehab bed       Subjective:     Discussed with RN events overnight. No specific complaints   On RA  R LE swelling     Review of Systems:  Symptom Y/N Comments  Symptom Y/N Comments   Fever/Chills    Chest Pain     Poor Appetite    Edema     Cough    Abdominal Pain     Sputum    Joint Pain     SOB/SOLARES    Pruritis/Rash     Nausea/vomit    Tolerating PT/OT     Diarrhea    Tolerating Diet     Constipation    Other       PO intake: No data found.     Wt Readings from Last 10 Encounters:   11/15/22 112 kg (246 lb 14.6 oz)   12/10/21 109.4 kg (241 lb 2.9 oz)   21 103.4 kg (228 lb)   21 108 kg (238 lb)   17 112.9 kg (249 lb)   06/29/10 105.1 kg (231 lb 9.6 oz)   06/24/10 105.2 kg (232 lb)   06/22/10 105.7 kg (233 lb) 06/15/10 104.5 kg (230 lb 6.4 oz)   06/10/10 104.8 kg (231 lb 2 oz)       Objective:     VITALS:   Last 24hrs VS reviewed since prior progress note. Most recent are:  Patient Vitals for the past 24 hrs:   Temp Pulse Resp BP SpO2   11/15/22 1309 -- -- -- 116/81 94 %   11/15/22 1200 97.6 °F (36.4 °C) -- -- 107/66 96 %   11/15/22 1100 -- 72 21 92/75 --   11/15/22 1022 -- 72 -- 98/69 (P) 96 %   11/15/22 1000 -- 80 20 105/74 --   11/15/22 0900 -- 70 -- 114/75 94 %   11/15/22 0800 97.7 °F (36.5 °C) 77 20 (!) 92/58 93 %   11/15/22 0700 -- 100 21 97/65 --   11/15/22 0607 -- 70 -- 98/70 --   11/15/22 0400 97.7 °F (36.5 °C) 72 21 113/79 98 %   11/15/22 0200 -- 70 23 -- 98 %   11/15/22 0000 97.7 °F (36.5 °C) 70 21 110/78 96 %   11/14/22 2228 -- 74 -- 129/82 --   11/14/22 2200 -- 71 22 112/86 98 %   11/14/22 2000 97.5 °F (36.4 °C) 95 20 117/84 94 %   11/14/22 1816 -- 88 18 107/82 98 %   11/14/22 1705 -- 70 26 111/75 92 %   11/14/22 1604 98.4 °F (36.9 °C) 70 19 115/74 94 %   11/14/22 1512 -- -- -- -- 96 %   11/14/22 1435 -- 70 21 101/70 95 %         Intake/Output Summary (Last 24 hours) at 11/15/2022 1401  Last data filed at 11/15/2022 1200  Gross per 24 hour   Intake 607.67 ml   Output 1405 ml   Net -797.33 ml          I had a face to face encounter, and independently examined this patient as outlined below:    PHYSICAL EXAM:  General:    No distress     HEENT: Atraumatic, anicteric sclerae, pink conjunctivae, MMM  Neck:  Supple, symmetrical  Lungs:   CTA. No Wheezing/Rhonchi. No rales. No tenderness. No Accessory muscle use. CVS:   Regular rhythm. No murmur. No JVD   GI/:   Soft. NT. ND. BS normal  Extremities: R thigh edema. No cyanosis. No clubbing. Dressing intact over thigh, ecchymosis  Skin:     Not pale. Not Jaundiced. No rashes   Psych:  Poor insight. Not depressed. Not anxious or agitated. Neurologic: Alert and oriented X2-3. EOMs intact. No facial asymmetry. No slurred speech.  Symmetrical strength, Sensation grossly intact. Labs     I reviewed today's most current labs and imaging studies. Pertinent labs include:  Recent Labs     11/15/22  0407 11/14/22  0358 11/13/22  0443   WBC 17.8*  --  12.0*   HGB 11.9* 12.1 13.5   HCT 37.5  --  40.7     --  195       Recent Labs     11/15/22  0407 11/14/22  0358 11/13/22  0443   * 134* 133*   K 4.6 4.6 4.3    100 99   CO2 30 29 30   * 255* 173*   BUN 36* 29* 24*   CREA 1.17 1.23 1.06   CA 9.8 9.5 9.5   PHOS 2.7 3.0 2.4*   ALB 2.4*  --  2.3*   TBILI 0.4  --  0.9   ALT 27  --  25       XR PELV AP ONLY    Result Date: 11/14/2022  1. No complicating features post right hip replacement     XR HIP RT W OR WO PELV 2-3 VWS    Result Date: 11/9/2022  Questionable nondisplaced right femoral neck fracture. XR HIP RT DURING OR PROC    Result Date: 11/13/2022  Interval right THR. CT HEAD WO CONT    Result Date: 11/9/2022  No acute intracranial finding. CT PELV WO CONT    Result Date: 11/9/2022  Mildly impacted subcapital right femoral neck fracture. Mild right hip osteoarthritis. XR CHEST PORT    Result Date: 11/12/2022  Mild edema pattern is slightly improved. Improving hazy left basilar atelectasis/airspace disease. Small left pleural effusion is likely unchanged. No definite pneumothorax. XR CHEST PORT    Result Date: 11/10/2022  1. ET tube and right IJ central line in appropriate position. 2. Shallow volumes, bibasilar atelectasis. 3. Pulmonary vascular congestion, probable interstitial edema. XR CHEST PORT    Result Date: 11/9/2022  No Acute Disease. XR PELV AP ONLY    Result Date: 11/14/2022  1.  No complicating features post right hip replacement       All Micro Results       None              Current Medications:     Current Facility-Administered Medications:     insulin glargine (LANTUS) injection 10 Units, 10 Units, SubCUTAneous, QHS, Vanesa Lugo MD    benzocaine-menthoL (CHLORASEPTIC MAX) lozenge 1 Lozenge, 1 Lozenge, Oral, Q2H PRN, Bayron Mercer Mai, PA-C, 1 Lozenge at 11/14/22 9235    amiodarone (CORDARONE) tablet 400 mg, 400 mg, Oral, Q8H, Hermilo Horne III, DO, 400 mg at 11/15/22 1302    insulin lispro (HUMALOG) injection, , SubCUTAneous, TIDAC, Jessi Velazquez MD, 5 Units at 11/15/22 1227    glucose chewable tablet 16 g, 4 Tablet, Oral, PRN, Jessi Velazquez MD    glucagon (GLUCAGEN) injection 1 mg, 1 mg, IntraMUSCular, PRN, Jessi Velazquez MD    dextrose 10 % infusion 0-250 mL, 0-250 mL, IntraVENous, PRN, Jessi Velazquez MD    famotidine (PEPCID) tablet 20 mg, 20 mg, Oral, Q12H, Vanesa Lugo MD, 20 mg at 11/15/22 0936    alcohol 62% (NOZIN) nasal  1 Ampule, 1 Ampule, Topical, Q12H, Justo Turner MD, 1 Ampule at 11/15/22 4173    [Held by provider] heparin (porcine) injection 5,000 Units, 5,000 Units, SubCUTAneous, Q8H, Justo Turner MD    sacubitriL-valsartan (ENTRESTO) 24-26 mg tablet 1 Tablet, 1 Tablet, Oral, BID, Justo Turner MD, 1 Tablet at 11/15/22 4378    acetaminophen (TYLENOL) tablet 1,000 mg, 1,000 mg, Oral, Q8H, Justo Turner MD, 1,000 mg at 11/15/22 1302    polyethylene glycol (MIRALAX) packet 17 g, 17 g, Oral, DAILY, Justo Turner MD, 17 g at 11/15/22 0933    bisacodyL (DULCOLAX) suppository 10 mg, 10 mg, Rectal, DAILY PRN, Justo Turner MD    aspirin delayed-release tablet 81 mg, 81 mg, Oral, BID, Justo Turner MD, 81 mg at 11/15/22 0935    calcium carbonate (TUMS) chewable tablet 200 mg [elemental], 200 mg, Oral, PRN, Justo Turner MD, 200 mg at 11/12/22 1158    lidocaine 4 % patch 1 Patch, 1 Patch, TransDERmal, Q24H, Justo Turner MD, 1 Patch at 11/14/22 1502    DULoxetine (CYMBALTA) capsule 30 mg, 30 mg, Oral, DAILY, Justo Turner MD, 30 mg at 11/15/22 0935    furosemide (LASIX) tablet 60 mg, 60 mg, Oral, DAILY, Justo Turner MD, 60 mg at 11/15/22 0935    metoprolol succinate (TOPROL-XL) XL tablet 25 mg, 25 mg, Oral, DAILY, Justo Turner MD, 25 mg at 11/15/22 0935    niacin ER (NIASPAN) tablet 1,500 mg, 1,500 mg, Oral, QHS, Sabrina Gaspar MD, 1,500 mg at 11/14/22 2228    potassium chloride SR (KLOR-CON 10) tablet 10 mEq, 10 mEq, Oral, DAILY, Sabrina Gaspar MD, 10 mEq at 11/15/22 0935    sodium chloride (NS) flush 5-40 mL, 5-40 mL, IntraVENous, Q8H, Sabrina Gaspar MD, 10 mL at 11/15/22 4751    sodium chloride (NS) flush 5-40 mL, 5-40 mL, IntraVENous, PRN, Sabrina Gaspar MD, 10 mL at 11/10/22 1220    polyethylene glycol (MIRALAX) packet 17 g, 17 g, Oral, DAILY PRN, Sabrina Gaspar MD    ondansetron (ZOFRAN ODT) tablet 4 mg, 4 mg, Oral, Q8H PRN **OR** ondansetron (ZOFRAN) injection 4 mg, 4 mg, IntraVENous, Q6H PRN, Sabrina Gaspar MD    oxyCODONE IR (ROXICODONE) tablet 5 mg, 5 mg, Oral, Q4H PRN, Sabrina Gaspar MD, 5 mg at 11/12/22 1717    morphine injection 2 mg, 2 mg, IntraVENous, Q4H PRN, Sabrina Gaspar MD, 2 mg at 11/12/22 0226    naloxone [facility]) injection 0.4 mg, 0.4 mg, IntraVENous, PRN, Sabrina Gaspar MD    senna-docusate (PERICOLACE) 8.6-50 mg per tablet 2 Tablet, 2 Tablet, Oral, BID, Sabrina Gaspar MD, 2 Tablet at 11/15/22 6272     Procedures: see electronic medical records for all procedures/Xrays and details which were not copied into this note but were reviewed prior to creation of Plan. Reviewed most current lab test results and cultures  YES  Reviewed most current radiology test results   YES  Review and summation of old records today    NO  Reviewed patient's current orders and MAR    YES  PMH/SH reviewed - no change compared to H&P  ________________________________________________________________________  Care Plan discussed with:    Comments   Patient x    Family      RN x    Care Manager     Consultant                        Multidiciplinary team rounds were held today with , nursing, pharmacist and clinical coordinator.   Patient's plan of care was discussed; medications were reviewed and discharge planning was addressed.      ________________________________________________________________________  Total NON critical care TIME:   33  Minutes    Total CRITICAL CARE TIME Spent:   Minutes non procedure based      Comments   >50% of visit spent in counseling and coordination of care x     This includes time during multidisciplinary rounds if indicated above   ________________________________________________________________________  Louise Perez MD

## 2022-11-16 LAB
ANION GAP SERPL CALC-SCNC: 6 MMOL/L (ref 5–15)
BASOPHILS # BLD: 0 K/UL (ref 0–0.1)
BASOPHILS NFR BLD: 0 % (ref 0–1)
BUN SERPL-MCNC: 37 MG/DL (ref 6–20)
BUN/CREAT SERPL: 30 (ref 12–20)
CALCIUM SERPL-MCNC: 9.5 MG/DL (ref 8.5–10.1)
CHLORIDE SERPL-SCNC: 101 MMOL/L (ref 97–108)
CO2 SERPL-SCNC: 29 MMOL/L (ref 21–32)
CREAT SERPL-MCNC: 1.25 MG/DL (ref 0.7–1.3)
DIFFERENTIAL METHOD BLD: ABNORMAL
EOSINOPHIL # BLD: 0 K/UL (ref 0–0.4)
EOSINOPHIL NFR BLD: 0 % (ref 0–7)
ERYTHROCYTE [DISTWIDTH] IN BLOOD BY AUTOMATED COUNT: 13 % (ref 11.5–14.5)
EST. AVERAGE GLUCOSE BLD GHB EST-MCNC: 126 MG/DL
GLUCOSE BLD STRIP.AUTO-MCNC: 137 MG/DL (ref 65–117)
GLUCOSE BLD STRIP.AUTO-MCNC: 145 MG/DL (ref 65–117)
GLUCOSE BLD STRIP.AUTO-MCNC: 168 MG/DL (ref 65–117)
GLUCOSE BLD STRIP.AUTO-MCNC: 174 MG/DL (ref 65–117)
GLUCOSE SERPL-MCNC: 162 MG/DL (ref 65–100)
HBA1C MFR BLD: 6 % (ref 4–5.6)
HCT VFR BLD AUTO: 35.7 % (ref 36.6–50.3)
HGB BLD-MCNC: 12.1 G/DL (ref 12.1–17)
IMM GRANULOCYTES # BLD AUTO: 0.1 K/UL (ref 0–0.04)
IMM GRANULOCYTES NFR BLD AUTO: 1 % (ref 0–0.5)
LYMPHOCYTES # BLD: 1.4 K/UL (ref 0.8–3.5)
LYMPHOCYTES NFR BLD: 8 % (ref 12–49)
MCH RBC QN AUTO: 33.9 PG (ref 26–34)
MCHC RBC AUTO-ENTMCNC: 33.9 G/DL (ref 30–36.5)
MCV RBC AUTO: 100 FL (ref 80–99)
MONOCYTES # BLD: 1.4 K/UL (ref 0–1)
MONOCYTES NFR BLD: 8 % (ref 5–13)
NEUTS SEG # BLD: 13.6 K/UL (ref 1.8–8)
NEUTS SEG NFR BLD: 82 % (ref 32–75)
NRBC # BLD: 0 K/UL (ref 0–0.01)
NRBC BLD-RTO: 0 PER 100 WBC
PHOSPHATE SERPL-MCNC: 3.1 MG/DL (ref 2.6–4.7)
PLATELET # BLD AUTO: 253 K/UL (ref 150–400)
PMV BLD AUTO: 11.3 FL (ref 8.9–12.9)
POTASSIUM SERPL-SCNC: 4.8 MMOL/L (ref 3.5–5.1)
RBC # BLD AUTO: 3.57 M/UL (ref 4.1–5.7)
SERVICE CMNT-IMP: ABNORMAL
SODIUM SERPL-SCNC: 136 MMOL/L (ref 136–145)
WBC # BLD AUTO: 16.6 K/UL (ref 4.1–11.1)

## 2022-11-16 PROCEDURE — 74011000250 HC RX REV CODE- 250: Performed by: ORTHOPAEDIC SURGERY

## 2022-11-16 PROCEDURE — 74011250636 HC RX REV CODE- 250/636: Performed by: ORTHOPAEDIC SURGERY

## 2022-11-16 PROCEDURE — 74011636637 HC RX REV CODE- 636/637: Performed by: GENERAL ACUTE CARE HOSPITAL

## 2022-11-16 PROCEDURE — 74011250637 HC RX REV CODE- 250/637: Performed by: ORTHOPAEDIC SURGERY

## 2022-11-16 PROCEDURE — 82962 GLUCOSE BLOOD TEST: CPT

## 2022-11-16 PROCEDURE — 36415 COLL VENOUS BLD VENIPUNCTURE: CPT

## 2022-11-16 PROCEDURE — 74011250637 HC RX REV CODE- 250/637: Performed by: INTERNAL MEDICINE

## 2022-11-16 PROCEDURE — 65270000046 HC RM TELEMETRY

## 2022-11-16 PROCEDURE — 99233 SBSQ HOSP IP/OBS HIGH 50: CPT | Performed by: CLINICAL NURSE SPECIALIST

## 2022-11-16 PROCEDURE — 74011250637 HC RX REV CODE- 250/637: Performed by: GENERAL ACUTE CARE HOSPITAL

## 2022-11-16 PROCEDURE — 99223 1ST HOSP IP/OBS HIGH 75: CPT | Performed by: STUDENT IN AN ORGANIZED HEALTH CARE EDUCATION/TRAINING PROGRAM

## 2022-11-16 PROCEDURE — 97530 THERAPEUTIC ACTIVITIES: CPT

## 2022-11-16 PROCEDURE — 84100 ASSAY OF PHOSPHORUS: CPT

## 2022-11-16 PROCEDURE — 83036 HEMOGLOBIN GLYCOSYLATED A1C: CPT

## 2022-11-16 PROCEDURE — 97535 SELF CARE MNGMENT TRAINING: CPT

## 2022-11-16 PROCEDURE — 85025 COMPLETE CBC W/AUTO DIFF WBC: CPT

## 2022-11-16 PROCEDURE — 97116 GAIT TRAINING THERAPY: CPT

## 2022-11-16 PROCEDURE — 80048 BASIC METABOLIC PNL TOTAL CA: CPT

## 2022-11-16 PROCEDURE — 74011636637 HC RX REV CODE- 636/637: Performed by: INTERNAL MEDICINE

## 2022-11-16 RX ADMIN — RIVAROXABAN 15 MG: 15 TABLET, FILM COATED ORAL at 17:19

## 2022-11-16 RX ADMIN — Medication 2 UNITS: at 11:59

## 2022-11-16 RX ADMIN — SENNOSIDES AND DOCUSATE SODIUM 2 TABLET: 50; 8.6 TABLET ORAL at 17:19

## 2022-11-16 RX ADMIN — ASPIRIN 81 MG: 81 TABLET, COATED ORAL at 09:06

## 2022-11-16 RX ADMIN — SACUBITRIL AND VALSARTAN 1 TABLET: 24; 26 TABLET, FILM COATED ORAL at 09:06

## 2022-11-16 RX ADMIN — Medication 2 UNITS: at 09:06

## 2022-11-16 RX ADMIN — ACETAMINOPHEN 1000 MG: 500 TABLET ORAL at 15:29

## 2022-11-16 RX ADMIN — POLYETHYLENE GLYCOL 3350 17 G: 17 POWDER, FOR SOLUTION ORAL at 09:07

## 2022-11-16 RX ADMIN — POTASSIUM CHLORIDE 10 MEQ: 750 TABLET, FILM COATED, EXTENDED RELEASE ORAL at 09:06

## 2022-11-16 RX ADMIN — FUROSEMIDE 60 MG: 40 TABLET ORAL at 09:06

## 2022-11-16 RX ADMIN — NIACIN 1500 MG: 500 TABLET, EXTENDED RELEASE ORAL at 21:31

## 2022-11-16 RX ADMIN — AMIODARONE HYDROCHLORIDE 400 MG: 200 TABLET ORAL at 05:48

## 2022-11-16 RX ADMIN — SODIUM CHLORIDE, PRESERVATIVE FREE 10 ML: 5 INJECTION INTRAVENOUS at 15:29

## 2022-11-16 RX ADMIN — AMIODARONE HYDROCHLORIDE 400 MG: 200 TABLET ORAL at 21:31

## 2022-11-16 RX ADMIN — METOPROLOL SUCCINATE 25 MG: 25 TABLET, FILM COATED, EXTENDED RELEASE ORAL at 09:06

## 2022-11-16 RX ADMIN — INSULIN GLARGINE 10 UNITS: 100 INJECTION, SOLUTION SUBCUTANEOUS at 21:32

## 2022-11-16 RX ADMIN — Medication 1 AMPULE: at 21:31

## 2022-11-16 RX ADMIN — DULOXETINE 30 MG: 30 CAPSULE, DELAYED RELEASE ORAL at 09:06

## 2022-11-16 RX ADMIN — MORPHINE SULFATE 2 MG: 2 INJECTION, SOLUTION INTRAMUSCULAR; INTRAVENOUS at 01:46

## 2022-11-16 RX ADMIN — FAMOTIDINE 20 MG: 20 TABLET, FILM COATED ORAL at 09:06

## 2022-11-16 RX ADMIN — FAMOTIDINE 20 MG: 20 TABLET, FILM COATED ORAL at 21:31

## 2022-11-16 RX ADMIN — SODIUM CHLORIDE, PRESERVATIVE FREE 10 ML: 5 INJECTION INTRAVENOUS at 21:32

## 2022-11-16 RX ADMIN — AMIODARONE HYDROCHLORIDE 400 MG: 200 TABLET ORAL at 15:29

## 2022-11-16 RX ADMIN — ACETAMINOPHEN 1000 MG: 500 TABLET ORAL at 05:48

## 2022-11-16 RX ADMIN — SENNOSIDES AND DOCUSATE SODIUM 2 TABLET: 50; 8.6 TABLET ORAL at 09:06

## 2022-11-16 RX ADMIN — ACETAMINOPHEN 1000 MG: 500 TABLET ORAL at 21:31

## 2022-11-16 RX ADMIN — Medication 1 AMPULE: at 11:59

## 2022-11-16 RX ADMIN — SACUBITRIL AND VALSARTAN 1 TABLET: 24; 26 TABLET, FILM COATED ORAL at 17:19

## 2022-11-16 RX ADMIN — SODIUM CHLORIDE, PRESERVATIVE FREE 10 ML: 5 INJECTION INTRAVENOUS at 05:48

## 2022-11-16 NOTE — CONSULTS
[facility]  [address], [address] [facility] Landon Hernandes, [address] [place]  [phone]      Hematology/ Oncology Consult Note      Reason for consult:     Coleen Hsieh is a 68 y.o. male who we have been asked to see by Dr. Nacho Rosenberg for nonocclusive DVT. Subjective:     Coleen Hsieh is a 26-year-old male who suffered a femur fracture after a ground-level fall. He is status post femur repair. Past medical history includes chronic systolic congestive heart failure, heart block, status post pacemaker insertion and hypertension. Patient was seen at bedside, he is a poor historian and kept falling asleep during conversation. He did deny any pain in his lower extremities other than surgical incision. Review of Systems:  Pertinent items are noted in the History of Present Illness. Past Medical History:   Diagnosis Date    Chronic systolic congestive heart failure ([address] 08/14/2017    Essential hypertension 08/14/2017    Heart block 08/14/2017    Pacemaker 08/14/2017     Past Surgical History:   Procedure Laterality Date    HX BLADDER REPAIR      HX CORONARY STENT PLACEMENT      HX HIP REPLACEMENT Left 12/2021    HX PACEMAKER      ALSO MULTI  GENERATOR CHANGES      History reviewed. No pertinent family history.   Social History     Tobacco Use    Smoking status: Never    Smokeless tobacco: Not on file   Substance Use Topics    Alcohol use: Never      Current Facility-Administered Medications   Medication Dose Route Frequency Provider Last Rate Last Admin    rivaroxaban (XARELTO) tablet 15 mg  15 mg Oral BID WITH MEALS Hiram Florian MD        Followed by    Jessica Chauhan ON 12/7/2022] rivaroxaban (XARELTO) tablet 20 mg  20 mg Oral DAILY WITH Willadean Alert, Tasneem Rogers MD        insulin glargine (LANTUS) injection 10 Units  10 Units SubCUTAneous QHS David Wolff MD   10 Units at 11/15/22 2213    benzocaine-menthoL (Vester Steinauer MAX) lozenge 1 Lozenge  1 Lozenge Oral Q2H PRN Sonia Hobbs PA-C   1 Lozenge at 11/14/22 0342    amiodarone (CORDARONE) tablet 400 mg  400 mg Oral Q8H Hermilo Horne III, DO   400 mg at 11/16/22 1529    insulin lispro (HUMALOG) injection   SubCUTAneous Eileen Kearns MD   2 Units at 11/16/22 1159    glucose chewable tablet 16 g  4 Tablet Oral PRN Nidia Ansari MD        glucagon (GLUCAGEN) injection 1 mg  1 mg IntraMUSCular PRN Nidia Ansari MD        dextrose 10 % infusion 0-250 mL  0-250 mL IntraVENous PRN Nidia Ansari MD        famotidine (PEPCID) tablet 20 mg  20 mg Oral Q12H Vanesa Lugo MD   20 mg at 11/16/22 8404    alcohol 62% (NOZIN) nasal  1 Ampule  1 Ampule Topical Q12H Stephanie Bhat MD   1 Ampule at 11/16/22 1159    sacubitriL-valsartan (ENTRESTO) 24-26 mg tablet 1 Tablet  1 Tablet Oral BID Stephanie Bhat MD   1 Tablet at 11/16/22 0906    acetaminophen (TYLENOL) tablet 1,000 mg  1,000 mg Oral Q8H Stephanie Bhat MD   1,000 mg at 11/16/22 1529    polyethylene glycol (MIRALAX) packet 17 g  17 g Oral DAILY Stephanie Bhat MD   17 g at 11/16/22 4392    bisacodyL (DULCOLAX) suppository 10 mg  10 mg Rectal DAILY PRN Stephanie Bhat MD        calcium carbonate (TUMS) chewable tablet 200 mg [elemental]  200 mg Oral PRN Stephanie Bhat MD   200 mg at 11/12/22 1158    lidocaine 4 % patch 1 Patch  1 Patch TransDERmal Q24H Stephanie Bhat MD   1 Patch at 11/16/22 1529    DULoxetine (CYMBALTA) capsule 30 mg  30 mg Oral DAILY Stephanie Bhat MD   30 mg at 11/16/22 0906    furosemide (LASIX) tablet 60 mg  60 mg Oral DAILY Stephanie Bhat MD   60 mg at 11/16/22 2936    metoprolol succinate (TOPROL-XL) XL tablet 25 mg  25 mg Oral DAILY Stephanie Bhat MD   25 mg at 11/16/22 0906    niacin ER (NIASPAN) tablet 1,500 mg  1,500 mg Oral QHS Stephanie Bhat MD   1,500 mg at 11/15/22 2212    potassium chloride SR (KLOR-CON 10) tablet 10 mEq  10 mEq Oral DAILY Stephanie Bhat, MD   10 mEq at 11/16/22 0906    sodium chloride (NS) flush 5-40 mL  5-40 mL IntraVENous Q8H Justo Turner MD   10 mL at 11/16/22 1529    sodium chloride (NS) flush 5-40 mL  5-40 mL IntraVENous PRN Justo Turner MD   10 mL at 11/10/22 1220    polyethylene glycol (MIRALAX) packet 17 g  17 g Oral DAILY PRN Justo Turner MD        ondansetron (ZOFRAN ODT) tablet 4 mg  4 mg Oral Q8H PRN Justo Turner MD        Or    ondansetron [facility]) injection 4 mg  4 mg IntraVENous Q6H PRN Justo Turner MD        oxyCODONE IR (ROXICODONE) tablet 5 mg  5 mg Oral Q4H PRN Justo Turner MD   5 mg at 11/12/22 1717    morphine injection 2 mg  2 mg IntraVENous Q4H PRN Justo Turner MD   2 mg at 11/16/22 0146    naloxone [facility]) injection 0.4 mg  0.4 mg IntraVENous PRN Justo Turner MD        senna-docusate (PERICOLACE) 8.6-50 mg per tablet 2 Tablet  2 Tablet Oral BID Justo Turner MD   2 Tablet at 11/16/22 7793        Allergies   Allergen Reactions    Celery Other (comments)     Mouth burns    Erythromycin Other (comments)     cramping    Green Pepper Nausea and Vomiting     Flu like S/S    Statins-Hmg-Coa Reductase Inhibitors Myalgia          Objective:     Patient Vitals for the past 8 hrs:   BP Temp Pulse Resp SpO2 Weight   11/16/22 1146 96/68 97.8 °F (36.6 °C) 81 23 94 % --   11/16/22 0822 -- -- -- -- -- 244 lb 4.3 oz (110.8 kg)   11/16/22 0800 (!) 137/96 97.9 °F (36.6 °C) 71 19 93 % --       Lab Results   Component Value Date/Time    WBC 16.6 (H) 11/16/2022 03:36 AM    HGB 12.1 11/16/2022 03:36 AM    HCT 35.7 (L) 11/16/2022 03:36 AM    PLATELET 338 63/59/5533 03:36 AM    .0 (H) 11/16/2022 03:36 AM       Physical Exam:   Visit Vitals  BP 96/68 (BP 1 Location: Right upper arm, BP Patient Position: Sitting)   Pulse 81   Temp 97.8 °F (36.6 °C)   Resp 23   Ht 6' 6\" (1.981 m)   Wt 244 lb 4.3 oz (110.8 kg)   SpO2 94%   BMI 28.23 kg/m²     General appearance: cooperative, no distress, appears stated age, lethargic  Head: Normocephalic, without obvious abnormality, atraumatic  Abdomen: soft, non-tender. Bowel sounds normal. No masses,  no organomegaly  Neurologic: Grossly normal other than lethargy    Assessment:     Nonocclusive DVT with recent right femur fracture and repair  Bilateral lower extremity Dopplers:  No evidence of deep vein thrombosis in the right lower extremity. Non-occlusive thrombus present in the left common femoral vein. Non-occlusive thrombus present in the left proximal femoral vein. Plan:     > Given immobility with recent femur fracture and repair would recommend DOAC for at least 3 months    Discussed with Dr. Herbie Krabbe    Thank you for allowing us to participate in the care of Mr. Huseiyn Johnson. Will arrange outpatient follow-up in 3 to 4 weeks to ensure patient is tolerating anticoagulation.     Laura Mcclendon NP

## 2022-11-16 NOTE — PROGRESS NOTES
Hospitalist Progress Note    NAME: Kaia Chung   :  1945   MRN:  [id number]       Assessment / Plan:    Recurrent non sustained V tach. -Hip surgery deferred due to dysrhythmia after intubation.  -Continue amiodarone, switch drip to  TID, on DC lower dose to 400 mg daily  -[facility]  -S/p LHC on 11/10 was unremarkable. -EP consult appreciated. Pt may needs DFT in a month  -Needs to f[facility] after DC     Right hip Fx.  -He was going to get hip surgery but canceled after intubation due to ongoing dysrhythmia.  -s/p Right total hip arthroplasty for femoral neck fracture on   -PT OT recs SNF ,patient and family decided to go home with home health  -Asa BID for DVT ppx, switch to Xarelto due to DVT     Nonocclusive DVT left lower extremity  Leukocytosis  Pt was found to have a nonocclusive DVT on the left femoral vein  Started on Xarelto, appreciate heme-onc input  Leukocytosis can be due to fracture versus DVT  Chronic systolic HF  Continue diuresis as tolerated. Resume entresto      ? New onset DM  Check A1c, still pending ,depending on results can be discharged on oral diabetic meds  Lantus  ISS  DM mngt consult     Leukocytosis  Likely reactive  Wbc trending down, UA negative chest x-ray showed no pneumonia  Venous  Doppler showed nonocclusive left lower extremity DVT  DVT prophylaxis: Asa BID/SCDs  SUP: Pepcid. Code status: Full code. 25.0 - 29.9 Overweight / Body mass index is 28.23 kg/m².   Recommended Disposition: [facility]/Rehab patient prefers to go home with home health  Anticipated Discharge Date:   Discussed with patient wife at bedside, all questions answered     Subjective:   FU hip fractures, nonsustained V. tach  Is eager to go home    Review of Systems:  Symptom Y/N Comments  Symptom Y/N Comments   Fever/Chills n   Chest Pain n    Poor Appetite Edema     Cough n   Abdominal Pain n    Sputum    Joint Pain     SOB/SOLARES    Pruritis/Rash     Nausea/vomit n   Tolerating PT/OT     Diarrhea    Tolerating Diet n    Constipation    Other       PO intake: No data found. Wt Readings from Last 10 Encounters:   11/16/22 110.8 kg (244 lb 4.3 oz)   12/10/21 109.4 kg (241 lb 2.9 oz)   11/29/21 103.4 kg (228 lb)   03/01/21 108 kg (238 lb)   08/14/17 112.9 kg (249 lb)   06/29/10 105.1 kg (231 lb 9.6 oz)   06/24/10 105.2 kg (232 lb)   06/22/10 105.7 kg (233 lb)   06/15/10 104.5 kg (230 lb 6.4 oz)   06/10/10 104.8 kg (231 lb 2 oz)       Objective:     VITALS:   Last 24hrs VS reviewed since prior progress note.  Most recent are:  Patient Vitals for the past 24 hrs:   Temp Pulse Resp BP SpO2   11/16/22 0800 97.9 °F (36.6 °C) 71 19 (!) 137/96 93 %   11/16/22 0700 -- 70 16 (!) 140/77 --   11/16/22 0600 -- 70 17 113/73 --   11/16/22 0548 -- 73 -- 115/76 --   11/16/22 0500 -- 71 19 115/76 --   11/16/22 0400 97.7 °F (36.5 °C) 70 19 132/70 95 %   11/16/22 0300 -- 70 17 131/76 --   11/16/22 0200 -- 71 17 122/84 --   11/16/22 0100 -- 70 19 130/85 --   11/16/22 0000 97.7 °F (36.5 °C) 76 21 115/72 93 %   11/15/22 2300 -- 70 17 -- 95 %   11/15/22 2201 -- 80 30 114/85 --   11/15/22 2100 -- 98 20 104/81 --   11/15/22 2000 97.6 °F (36.4 °C) (!) 112 26 93/65 (!) 86 %   11/15/22 1900 -- 72 12 110/72 --   11/15/22 1800 -- 91 29 (!) 95/57 91 %   11/15/22 1700 -- (!) 101 19 121/78 92 %   11/15/22 1600 97.9 °F (36.6 °C) 92 22 95/65 94 %   11/15/22 1500 -- 96 -- 99/65 --   11/15/22 1400 -- (!) 106 16 (!) 108/95 --   11/15/22 1309 -- -- -- 116/81 94 %   11/15/22 1200 97.6 °F (36.4 °C) -- -- 107/66 96 %   11/15/22 1100 -- 72 21 92/75 --   11/15/22 1022 -- 72 -- 98/69 (P) 96 %   11/15/22 1000 -- 80 20 105/74 --         Intake/Output Summary (Last 24 hours) at 11/16/2022 0937  Last data filed at 11/16/2022 0548  Gross per 24 hour   Intake 123.67 ml   Output 2065 ml   Net -1941.33 ml          I had a face to face encounter, and independently examined this patient as outlined below:    PHYSICAL EXAM:  General:    No distress     HEENT: Atraumatic, anicteric sclerae, pink conjunctivae, MMM  Neck:  Supple, symmetrical  Lungs:   CTA. No Wheezing/Rhonchi. No rales. No tenderness. No Accessory muscle use. CVS:   Regular rhythm. No murmur. No JVD   GI/:   Soft. NT. ND. BS normal  Extremities: R thigh edema. No cyanosis. No clubbing. Dressing intact over thigh, ecchymosis  Skin:     Not pale. Not Jaundiced. No rashes   Psych:  Poor insight. Not depressed. Not anxious or agitated. Neurologic: Alert and oriented X2-3. EOMs intact. No facial asymmetry. No slurred speech. Symmetrical strength, Sensation grossly intact. Labs     I reviewed today's most current labs and imaging studies. Pertinent labs include:  Recent Labs     11/16/22  0336 11/15/22  0407 11/14/22  0358   WBC 16.6* 17.8*  --    HGB 12.1 11.9* 12.1   HCT 35.7* 37.5  --     217  --        Recent Labs     11/16/22  0336 11/15/22  0407 11/14/22  0358    133* 134*   K 4.8 4.6 4.6    100 100   CO2 29 30 29   * 249* 255*   BUN 37* 36* 29*   CREA 1.25 1.17 1.23   CA 9.5 9.8 9.5   PHOS 3.1 2.7 3.0   ALB  --  2.4*  --    TBILI  --  0.4  --    ALT  --  27  --        XR PELV AP ONLY    Result Date: 11/14/2022  1. No complicating features post right hip replacement     XR HIP RT W OR WO PELV 2-3 VWS    Result Date: 11/9/2022  Questionable nondisplaced right femoral neck fracture. XR HIP RT DURING OR PROC    Result Date: 11/13/2022  Interval right THR. CT HEAD WO CONT    Result Date: 11/9/2022  No acute intracranial finding. CT PELV WO CONT    Result Date: 11/9/2022  Mildly impacted subcapital right femoral neck fracture. Mild right hip osteoarthritis. XR CHEST PORT    Result Date: 11/15/2022  Improved lung aeration. Decreased minimal pulmonary edema. Unchanged small left pleural effusion.  Recommend followup PA and lateral chest views in 8-10 weeks to ensure resolution. XR CHEST PORT    Result Date: 11/12/2022  Mild edema pattern is slightly improved. Improving hazy left basilar atelectasis/airspace disease. Small left pleural effusion is likely unchanged. No definite pneumothorax. XR CHEST PORT    Result Date: 11/10/2022  1. ET tube and right IJ central line in appropriate position. 2. Shallow volumes, bibasilar atelectasis. 3. Pulmonary vascular congestion, probable interstitial edema. XR CHEST PORT    Result Date: 11/9/2022  No Acute Disease. XR CHEST PORT    Result Date: 11/15/2022  Improved lung aeration. Decreased minimal pulmonary edema. Unchanged small left pleural effusion. Recommend followup PA and lateral chest views in 8-10 weeks to ensure resolution.        All Micro Results       None              Current Medications:     Current Facility-Administered Medications:     insulin glargine (LANTUS) injection 10 Units, 10 Units, SubCUTAneous, QHS, Vanesa Lugo MD, 10 Units at 11/15/22 2213    benzocaine-menthoL (CHLORASEPTIC MAX) lozenge 1 Lozenge, 1 Lozenge, Oral, Q2H PRN, Brian Mercer PA-C, 1 Lozenge at 11/14/22 8934    amiodarone (CORDARONE) tablet 400 mg, 400 mg, Oral, Q8H, Hermilo Horne III, DO, 400 mg at 11/16/22 0548    insulin lispro (HUMALOG) injection, , SubCUTAneous, TIDAC, Lissy Leo MD, 2 Units at 11/16/22 0906    glucose chewable tablet 16 g, 4 Tablet, Oral, PRN, Lissy Leo MD    glucagon (GLUCAGEN) injection 1 mg, 1 mg, IntraMUSCular, PRN, Lissy Leo MD    dextrose 10 % infusion 0-250 mL, 0-250 mL, IntraVENous, PRN, Lissy Leo MD    famotidine (PEPCID) tablet 20 mg, 20 mg, Oral, Q12H, Vanesa Lugo MD, 20 mg at 11/16/22 0906    alcohol 62% (NOZIN) nasal  1 Ampule, 1 Ampule, Topical, Q12H, Rylee Haq MD, 1 Ampule at 11/15/22 2215    [Held by provider] heparin (porcine) injection 5,000 Units, 5,000 Units, SubCUTAneous, Eduardo Garcia MD    sacubitriL-valsartan (ENTRESTO) 24-26 mg tablet 1 Tablet, 1 Tablet, Oral, BID, Ananth Blackwell MD, 1 Tablet at 11/16/22 1684    acetaminophen (TYLENOL) tablet 1,000 mg, 1,000 mg, Oral, Q8H, Ananth Blackwell MD, 1,000 mg at 11/16/22 0548    polyethylene glycol (MIRALAX) packet 17 g, 17 g, Oral, DAILY, Ananth Blackwell MD, 17 g at 11/16/22 0957    bisacodyL (DULCOLAX) suppository 10 mg, 10 mg, Rectal, DAILY PRN, Ananth Blackwell MD    aspirin delayed-release tablet 81 mg, 81 mg, Oral, BID, Ananth Blackwell MD, 81 mg at 11/16/22 0537    calcium carbonate (TUMS) chewable tablet 200 mg [elemental], 200 mg, Oral, PRN, Ananth Blackwell MD, 200 mg at 11/12/22 1158    lidocaine 4 % patch 1 Patch, 1 Patch, TransDERmal, Q24H, Ananth Blackwell MD, 1 Patch at 11/15/22 1538    DULoxetine (CYMBALTA) capsule 30 mg, 30 mg, Oral, DAILY, Ananth Blackwell MD, 30 mg at 11/16/22 0906    furosemide (LASIX) tablet 60 mg, 60 mg, Oral, DAILY, Ananth Blackwell MD, 60 mg at 11/16/22 0947    metoprolol succinate (TOPROL-XL) XL tablet 25 mg, 25 mg, Oral, DAILY, Ananth Blackwell MD, 25 mg at 11/16/22 3651    niacin ER (NIASPAN) tablet 1,500 mg, 1,500 mg, Oral, QHS, Ananth Blackwell MD, 1,500 mg at 11/15/22 2212    potassium chloride SR (KLOR-CON 10) tablet 10 mEq, 10 mEq, Oral, DAILY, Ananth Blackwell MD, 10 mEq at 11/16/22 0906    sodium chloride (NS) flush 5-40 mL, 5-40 mL, IntraVENous, Q8H, Ananth Blackwell MD, 10 mL at 11/16/22 0548    sodium chloride (NS) flush 5-40 mL, 5-40 mL, IntraVENous, PRN, Ananth Blackwell MD, 10 mL at 11/10/22 1220    polyethylene glycol (MIRALAX) packet 17 g, 17 g, Oral, DAILY PRN, Ananth Blackwell MD    ondansetron (ZOFRAN ODT) tablet 4 mg, 4 mg, Oral, Q8H PRN **OR** ondansetron (ZOFRAN) injection 4 mg, 4 mg, IntraVENous, Q6H PRN, Ananth Blackwell MD    oxyCODONE IR (ROXICODONE) tablet 5 mg, 5 mg, Oral, Q4H PRN, Ananth Blackwell MD, 5 mg at 11/12/22 1717    morphine injection 2 mg, 2 mg, IntraVENous, Q4H ETHANN, Mustapha Nick MD, 2 mg at 11/16/22 0146    naloxone [facility]) injection 0.4 mg, 0.4 mg, IntraVENous, PRN, Mustapha Nick MD    senna-docusate (McCurtain Calk) 8.6-50 mg per tablet 2 Tablet, 2 Tablet, Oral, BID, Mustapha Nick MD, 2 Tablet at 11/16/22 7549     Procedures: see electronic medical records for all procedures/Xrays and details which were not copied into this note but were reviewed prior to creation of Plan. Reviewed most current lab test results and cultures  YES  Reviewed most current radiology test results   YES  Review and summation of old records today    NO  Reviewed patient's current orders and MAR    YES  PMH/ reviewed - no change compared to H&P  ________________________________________________________________________  Care Plan discussed with:    Comments   Patient x    Family      RN x    Care Manager     Consultant                        Multidiciplinary team rounds were held today with , nursing, pharmacist and clinical coordinator. Patient's plan of care was discussed; medications were reviewed and discharge planning was addressed.      ________________________________________________________________________  Total NON critical care TIME:   33  Minutes    Total CRITICAL CARE TIME Spent:   Minutes non procedure based      Comments   >50% of visit spent in counseling and coordination of care x     This includes time during multidisciplinary rounds if indicated above   ________________________________________________________________________  Angelica Disla MD

## 2022-11-16 NOTE — PROGRESS NOTES
Problem: Mobility Impaired (Adult and Pediatric)  Goal: *Acute Goals and Plan of Care (Insert Text)  Description: FUNCTIONAL STATUS PRIOR TO ADMISSION: Patient poor historian providing inconsistent reports of PLOF. Appears he was ambulating in home using walking stick? ??, but has RW. Attempting to obtain scooter for community mobility. HOME SUPPORT PRIOR TO ADMISSION: The patient lived with family and required assistance for ADLs. Physical Therapy Goals  Initiated 11/14/2022  1. Patient will move from supine to sit and sit to supine , scoot up and down, and roll side to side in bed with minimal assistance/contact guard assist within 7 day(s). 2.  Patient will transfer from bed to chair and chair to bed with moderate assistance  using the least restrictive device within 7 day(s). 3.  Patient will perform sit to stand with moderate assistance  within 7 day(s). 4.  Patient will ambulate with moderate assistance  for 10 feet with the least restrictive device within 7 day(s). 5.  Patient will ascend/descend 4 stairs with 2 handrail(s) with moderate assistance  within 7 day(s). Outcome: Progressing Towards Goal   PHYSICAL THERAPY TREATMENT  Patient: Jennifer Kimbrough (2 y.o. male)  Date: 11/16/2022  Diagnosis: Right hip pain [M25.551]  Closed right hip fracture ([address] [S72.001A] <principal problem not specified>  Procedure(s) (LRB):  RIGHT HIP ARTHROPLASTY (Right) 3 Days Post-Op  Precautions:  WBAT  Chart, physical therapy assessment, plan of care and goals were reviewed. ASSESSMENT  Patient continues with skilled PT services and is progressing towards goals, demonstrating improved activity tolerance, continued improved active participation, improved strength, and overall improved functional mobility. Pt alert and oriented however with periodic confusion? Throughout. Pt able to perform x2 ambulation trials of 20ft and 25ft, respectively, with RW support and CGAx2.  Pt continues to favor L LE and exhibiting slight L lateral lean, avoiding bearing full weight through RLE. Gait slow and shuffled with antalgic gait pattern and mild increase in trunk sway, unsteady gait overall. Overall, pt tolerated therapy session well however remains well below his baseline independent/mod I level and at increased risk for falls as a result. Noted that wife would prefer for pt to return home however question ability of wife to safely provide level of assist that pt is currently requiring. Recommend intensive rehab at discharge. Current Level of Function Impacting Discharge (mobility/balance): Vadim for bed mobility, CG/Vadim for sit<>stand, CGA x2 w/ RW during ambulation    Other factors to consider for discharge: falls risk, falls history, transient confusion, BP soft but stable, impaired activity tolerance, caregiver burden         PLAN :  Patient continues to benefit from skilled intervention to address the above impairments. Continue treatment per established plan of care. to address goals. Recommendation for discharge: (in order for the patient to meet his/her long term goals)  Therapy 3 hours per day 5-7 days per week    This discharge recommendation:  Has been made in collaboration with the attending provider and/or case management    IF patient discharges home will need the following DME: Per chart review, pt owns rolling walker       SUBJECTIVE:   Patient stated It's a bad day to pet a cat.     OBJECTIVE DATA SUMMARY:   Critical Behavior:  Neurologic State: Alert  Orientation Level: Oriented X4 (transient confusion?)  Cognition: Follows commands  Safety/Judgement: Awareness of environment, Fall prevention, Home safety  Functional Mobility Training:  Bed Mobility:     Supine to Sit: Minimum assistance; Additional time;Bed Modified     Scooting: Contact guard assistance;Minimum assistance; Additional time        Transfers:  Sit to Stand: Contact guard assistance;Minimum assistance;Assist x2  Stand to Sit: Contact guard assistance;Assist x2        Bed to Chair: Contact guard assistance;Assist x2                    Balance:  Sitting: Impaired  Sitting - Static: Good (unsupported)  Sitting - Dynamic: Good (unsupported); Fair (occasional)  Standing: Impaired; With support (RW)  Standing - Static: Fair;Constant support  Standing - Dynamic : Fair;Constant support  Ambulation/Gait Training:  Distance (ft): 45 Feet (ft) (20ft, 25ft w/ seated rest break b/t)  Assistive Device: Walker, rolling;Gait belt  Ambulation - Level of Assistance: Contact guard assistance;Assist x2        Gait Abnormalities: Decreased step clearance; Antalgic; Shuffling gait        Base of Support: Shift to left     Speed/Shelley: Pace decreased (<100 feet/min)  Step Length: Left shortened;Right shortened             Pain Rating:  Denied c/o pain    Activity Tolerance:   VSS on RA however pt fatigues quickly     After treatment patient left in no apparent distress:   Sitting in chair, Call bell within reach, and Bed / chair alarm activated    COMMUNICATION/COLLABORATION:   The patients plan of care was discussed with: Occupational therapist and Registered nurse.      Jennifer Cole, PT, DPT   Time Calculation: 26 mins

## 2022-11-16 NOTE — PROGRESS NOTES
ORTHO - Progress Note  Post Op day: 3 Days  Ran Coreas     [id number]  male    68 y.o.    1945    Admit date:2022  Date of Surgery:2022   Procedures:Procedure(s):  RIGHT HIP ARTHROPLASTY  Surgeon:Surgeon(s) and Role:     Marvin Reagan MD - Primary        SUBJECTIVE:     Bib Payton is a 68 y.o. male resting in the chair. Patient has complaints of appropriate post-op pain. Mainly just taking tylenol for pain control mainly  Denies F/C, nausea, vomiting, dizziness, lightheadedness, chest pain, or shortness of breath. OBJECTIVE:       Physical Exam:  General: alert, cooperative, no distress. Gastrointestinal:  non-distended . Cardiovascular: equal pulses in the lower extremities,  Brisk cap refill in all distal extremities   Genitourinary: Voiding independently   Respiratory: No respiratory distress   Neurological:Neurovascular exam within normal limits. Senstion intact: LE bilat. Motor: + DF/PF/EHL. Musculoskeletal: Samantha's sign negative in bilateral lower extremities. Calves soft, supple, non-tender upon palpation or with passive stretch. Dressing/Wound:  Clean, dry and intact. No significant erythema or swelling.     Vital Signs:       Patient Vitals for the past 8 hrs:   BP Temp Pulse Resp SpO2 Weight   22 1146 96/68 97.8 °F (36.6 °C) 81 23 94 % --   22 0822 -- -- -- -- -- 110.8 kg (244 lb 4.3 oz)   22 0800 (!) 137/96 97.9 °F (36.6 °C) 71 19 93 % --                                          Temp (24hrs), Av.8 °F (36.6 °C), Min:97.6 °F (36.4 °C), Max:97.9 °F (36.6 °C)    Date 22 0700 - 22 0659   Shift 0128-1866 [id number] 3971-1197 24 Hour Total   INTAKE   P.O. 480   480   Shift Total(mL/kg) 480(4.3)   480(4.3)   OUTPUT   Urine(mL/kg/hr) 600(0.7)   600   Shift Total(mL/kg) 600(5.4)   600(5.4)   Weight (kg) 110.8 110.8 110.8 110.8     Labs:        Recent Labs     22  0336   HCT 35.7*   HGB 12.1     PT/OT:        Gait  Base of Support: Shift to left  Speed/Shelley: Pace decreased (<100 feet/min)  Step Length: Left shortened, Right shortened  Gait Abnormalities: Decreased step clearance, Antalgic, Shuffling gait  Ambulation - Level of Assistance: Contact guard assistance, Assist x2  Distance (ft): 45 Feet (ft) (20ft, 25ft w/ seated rest break b/t)  Assistive Device: Walker, rolling, Gait belt     ASSESSMENT / PLAN:   Active Problems:    Right hip pain (11/9/2022)      Closed right hip fracture ([address] (11/9/2022)       -  Continue PT/OT WBAT  -  DVT prophylaxis- Changed to xarelto due to LLE DVT by medical service.    -  DC planning - pending    Signed By: ELVIN Diaz

## 2022-11-16 NOTE — PROGRESS NOTES
Bedside shift change report given to Marlane Kussmaul (oncoming nurse) by Joan Crum (offgoing nurse). Report included the following information SBAR, Kardex, Intake/Output, MAR, Recent Results, Cardiac Rhythm AV paced, and Alarm Parameters . 1810 Pt has been alert and oriented today. Only required tylenol for discomfort. He was out of bed to the chair two times today. Transfers using walker and standby assist. He cathed himself 2 times today with total urine output of 1600ml. VS stable. 1918 Bedside shift change report given to Angelika (oncoming nurse) by Marlane Kussmaul (offgoing nurse). Report included the following information SBAR, Kardex, Intake/Output, MAR, Recent Results, Cardiac Rhythm AV paced, and Alarm Parameters .

## 2022-11-16 NOTE — DIABETES MGMT
[facility]    CLINICAL NURSE SPECIALIST CONSULT     Initial Presentation   Ovidio Beltran is a 68 y.o. male admitted 11/9/22 from the ER after experiencing a fall with subsequent right hip pain. He was walking with a cane and was able to soften the blow. He notes his pain is worse with movement and is very achy and and somewhat spasming. Denies prodromal symptoms. ER exam:  HENT:      Head: 4  centimeter firm hematoma to the right frontal region with small abrasion overlying. No skull fragments appreciated. Pupils are 2 mm and reactive. Mild right maxillary facial tenderness. No oral erythema or trauma. Ears are clear with no black sign negative raccoon sign. Musculoskeletal:      Cervical back: Normal range of motion. No tenderness. Tender to palpation over the right greater trochanter. Patient unable to lift the leg secondary to pain. Passive movement stopped secondary to pain. No pain in the bilateral ankles or knees. Neuro vas intact bilateral lower extremities bilateral upper extremities. Small abrasion of the right lateral epicondyle. Small abrasion over the dorsum of the right hand. DPs and radials 2+ bilateral upper and lower extremities. No CT or L-spine tenderness. No step-offs. Hips are stable   LAB: WBC 10.4. Norm H&H. /AG 5. Normal liver enzymes. Troponin 148/317. NT-pro BNP 5395. Urinary ketone Positive. CXR: No acute disease  CT Pelvis:  Mildly impacted subcapital right femoral neck fracture. Mild right hip osteoarthritis     HX:   Past Medical History:   Diagnosis Date    Chronic systolic congestive heart failure ([address] 08/14/2017    Essential hypertension 08/14/2017    Heart block 08/14/2017    Pacemaker 08/14/2017      INITIAL DX:   Right hip pain [M25.551]  Closed right hip fracture ([address] [S72.001A]     Current Treatment     TX: 11/13/22 1. Right total hip arthroplasty for femoral neck fracture, CPT 15101  2.  Fluoroscopy, directed by and interpreted by surgeon - CPT Nava. BP management. Pepcid. Insulin. Niacin. Entresto    Consulted by Provider for advanced diabetes nursing assessment and care for:   [] Transitioning off Lenor Custar   [x] Inpatient management strategy  [] Home management assessment  [] Survival skill education    Hospital Course   Clinical progress has been complicated by need for ICU level of care. Diabetes History   No history of personal history of diabetes  Grandson Positive for diabetes (per wife)  Wife has Type 2 diabetes    Self- care practices:   Eating pattern  [x] Breakfast Eggs with cheese and either Raison Bran or grits  [x] Dinner  Protein & vegetables; sometimes a starch  [x] Snacks  Sweets  [x] Beverages Water, regular soda  Physical activity pattern   [x] Not employing     Subjective   No one has diabetes that I know of.      Objective   Physical exam  General Overweight male in no acute distress. Conversant and cooperative  Neuro  Alert, oriented   Vital Signs Visit Vitals  BP (!) 137/96 (BP 1 Location: Right arm, BP Patient Position: At rest)   Pulse 71   Temp 97.9 °F (36.6 °C)   Resp 19   Ht 6' 6\" (1.981 m)   Wt 110.8 kg (244 lb 4.3 oz)   SpO2 93%   BMI 28.23 kg/m²     Laboratory  Recent Labs     11/16/22  0336 11/15/22  0407 11/14/22  0358   * 249* 255*   AGAP 6 3* 5   WBC 16.6* 17.8*  --    CREA 1.25 1.17 1.23   AST  --  27  --    ALT  --  27  --      Factors impacting BG management  Factor Dose Comments   Nutrition:  Standard meals   60 grams/meal   Eating 51-75%   Pain Scheduled Cymbalta  MS & oxycodone PRN    Infection  Afebrile.  WBC elevated   Other:   Kidney function  Liver function   Normal  Normal      Blood glucose pattern      Significant diabetes-related events over the past 24-72 hours  11/9/22 Admission   11/13/22 Dex 8mg X1 => Bgs 200-300s  11/14/22 Dex 10mg X1  11/15/22 Continues requiring corrective inslin => Lantus insulin added   No A1c    Assessment and Plan Nursing Diagnosis Risk for unstable blood glucose pattern   Nursing Intervention Domain 525 Decision-making Support   Nursing Interventions Examined current inpatient diabetes/blood glucose control   Explored factors facilitating and impeding inpatient management  Explored corrective strategies with patient and responsible inpatient provider   Informed patient of rational for insulin strategy while hospitalized     Nursing Diagnosis 66131 Ineffective Health Management   Nursing Intervention Domain 5253 Decision-makingSupport   Nursing Interventions Identified diabetes self-management practices impeding diabetes control  Discussed diabetes prevention strategies:   7% weight loss  Thirty minutes of PA per day     Evaluation   This 68year old  male was admitted with fractured right hip after a fall. Cardiology evaluated due to elevated troponins; underwent cardiac cath => No sig CAD, nml LVEF or VF. Orthopedic surgery evaluated & took to surgery (11/10/22); experienced sustained V-Tach & hypotension with anesthesia induction, which was aborted. Successful surgery on 11/13/22. As for BG control, patient did not have diabetes PTA. His wife does not diabetes and controls it with dietary strategies. She is the cook & prepares their meals; she offered that he has a significant sweet habit, in the form of cookies. Advised him that due to significant stress while hospitalized, his Bgs have required intervention. No A1c available at this time to confirm a new diagnosis, but it has been drawn. As for current plan, after only 10 units of basal insulin (0.1. units/kg/D), his BG this morning are in the 160s. This is seen in this gentleman who is eating without incident. If he does have confirmed diabetes, will likely need an oral agent at discharge. .     Recommendations     [x] Use of Subcutaneous Insulin Order set (4054)  Insulin Dosing Specific recommendation   Basal (Based on weight, BMI & GFR) [x]        0.1 units/kg/D      Nutritional                                      (Based on CHO/dextrose load)     Corrective                                       (Useful in adjusting insulin dosing) [x] HIGH sensitivity        Billing Code(s)   [x] 66873 IP subsequent hospital care - 35 minutes     Before making these care recommendations, I personally reviewed the hospitalization record, including notes, laboratory & diagnostic data and current medications, and examined the patient at the bedside (circumstances permitting) before making care recommendations. More than fifty (50) percent of the time was spent in patient counseling and/or care coordination.   Total minutes: [address] [place], CNS  Diabetes Clinical Nurse Specialist  [facility]  Access via [employer]

## 2022-11-16 NOTE — PROGRESS NOTES
Attempted to schedule hospital follow up PCP appointment at the [place]. Central scheduling sent a message to PCP RN to schedule appt. PCP RN will contact the patient with appointment information per office protocol. Pending patient discharge.  Soledad Freire, Care Management Assistant

## 2022-11-16 NOTE — PROGRESS NOTES
1900- Bedside and Verbal shift change report given to Lorenza Langford RN and AVRIL oYrk (oncoming nurse) by Roddy Moody RN (offgoing nurse). Report included the following information SBAR, Kardex, Intake/Output, MAR, Recent Results, and Cardiac Rhythm AV Paced . 2000- Shift assessment complete, see flowsheet for details. 2300- Assisted pt with self cath    0530- Pt bathed and assisted with self cath    0700- Bedside and Verbal shift change report given to AVRIL Santiago (oncoming nurse) by Lorenza Langford RN and AVRIL York (offgoing nurse). Report included the following information SBAR, Kardex, Intake/Output, MAR, Recent Results, and Cardiac Rhythm AV Paced .

## 2022-11-16 NOTE — PROGRESS NOTES
Problem: Self Care Deficits Care Plan (Adult)  Goal: *Acute Goals and Plan of Care (Insert Text)  Description: FUNCTIONAL STATUS PRIOR TO ADMISSION: Patient is a questionable historian. Per report, uses 6 ft walking stick in the home and RW when outside the home. Also reports being in the process of obtaining power wheelchair. Patient with history of falls. HOME SUPPORT: Per report, patient requires spouse assistance for lower body ADLs and bathing (min-mod A per patient description). Occupational Therapy Goals  Initiated 11/14/2022  1. Patient will perform lower body ADLs with AE and moderate assistance within 7 day(s). 2.  Patient will perform seated upper body ADLs with supervision/set-up within 7 day(s). 3.  Patient will tolerate static standing with RW support and moderate assistance in prep for standing ADLs within 7 day(s). 4.  Patient will perform toilet transfer to [facility] with moderate assistance within 7 day(s). 5.  Patient will perform all aspects of toileting with moderate assistance within 7 day(s). 6.  Patient will participate in upper extremity therapeutic exercise/activities with supervision/set-up for 10 minutes within 7 day(s). 7.  Patient will utilize energy conservation and fall prevention techniques during functional activities without cues within 7 day(s). Outcome: Progressing Towards Goal   OCCUPATIONAL THERAPY TREATMENT  Patient: Kaia Chung (73 y.o. male)  Date: 11/16/2022  Diagnosis: Right hip pain [M25.551]  Closed right hip fracture ([address] [S72.001A] <principal problem not specified>  Procedure(s) (LRB):  RIGHT HIP ARTHROPLASTY (Right) 3 Days Post-Op  Precautions:    Chart, occupational therapy assessment, plan of care, and goals were reviewed. ASSESSMENT  Patient continues with skilled OT services and is progressing towards goals. Patient is received resting in bed, agreeable to session with relatively improved mentation.  Patient demonstrates significant gains in functional mobility today, standing from EOB with CGA - min assist x2 and ambulating to sink for standing grooming. Patient benefits from cues for positioning, RW management, and equal LE weightbearing. During bimanual aspects of grooming task, patient maintains fair balance with intermittent trunk support via sink. Patient progressing well, however remains far from mod I baseline. For best outcome, recommend IPR at discharge. Understand family is hopeful to progress to discharge home. Current Level of Function Impacting Discharge (ADLs): CGA - min assist x2 for functional mobility w/ RW; min A for standing balance during ADLs; up to total assist for lower body    Other factors to consider for discharge:          PLAN :  Patient continues to benefit from skilled intervention to address the above impairments. Continue treatment per established plan of care to address goals. Recommend with staff: OOB to chair and toileting at [facility] with RW, gait belt, and assist x2    Recommendation for discharge: (in order for the patient to meet his/her long term goals)  Therapy 3 hours per day 5-7 days per week    This discharge recommendation:  Has not yet been discussed the attending provider and/or case management    IF patient discharges home will need the following DME: TBD       SUBJECTIVE:   Patient stated It's a bad day to pet a cat, might get injured.     OBJECTIVE DATA SUMMARY:   Cognitive/Behavioral Status:  Neurologic State: Alert  Orientation Level: Oriented X4 (transient confusion?)  Cognition: Follows commands  Perception: Appears intact  Perseveration: No perseveration noted  Safety/Judgement: Awareness of environment; Fall prevention;Home safety    Functional Mobility and Transfers for ADLs:  Bed Mobility:  Supine to Sit: Minimum assistance; Additional time;Bed Modified  Scooting: Contact guard assistance;Minimum assistance; Additional time    Transfers:  Sit to Stand: Contact guard assistance;Minimum assistance;Assist x2   Stand to Sit: Contact guard assistance;Assist x2 (cues for hand placement, RW management)  Bed to Chair: Contact guard assistance;Assist x2    Balance:  Sitting: Impaired  Sitting - Static: Good (unsupported)  Sitting - Dynamic: Good (unsupported); Fair (occasional)  Standing: Impaired  Standing - Static: Fair  Standing - Dynamic : Fair;Constant support    ADL Intervention:  Grooming  Grooming Assistance: Contact guard assistance  Position Performed: Standing  Washing Face: Contact guard assistance; Compensatory technique training  Brushing Teeth: Contact guard assistance; Compensatory technique training  Cues: Verbal cues provided  Adaptive Equipment:  (RW)    Upper Body Dressing Assistance  Dressing Assistance: Minimum assistance  Hospital Gown: Minimum  assistance    Lower Body Dressing Assistance  Dressing Assistance: Total assistance(dependent)  Socks: Total assistance (dependent)  Position Performed: Supine  Cues: Verbal cues provided;Physical assistance    Cognitive Retraining  Safety/Judgement: Awareness of environment; Fall prevention;Home safety    Precautions: Patient recalled and demonstrated avoiding extreme planes of movement with Right LE during ADLs and functional mobility with minimal cues. Home safety: Patient instructed on home modifications and safety (raise height of ADL objects, appropriate height of chair surfaces, recliner safety, change of floor surfaces, clear pathways) to increase independence and fall prevention. Patient indicated understanding. Standing: Patient instructed and demonstrated to walk up to sink/countertop/surfaces, step into walker to increase safety of joint and fall prevention with Contact guard assistance. Instructed to apply concept of hip contraindications to ADLs within the home (no extreme reaching across body, square off while using objects, slide objects along surfaces).   Patient instructed to increase amount of time standing, observe standing position during ADLs to increase even weight bearing through bilateral LEs to increase independence with ADLs. Goal to be reached 30 days post - op, per orthopedic surgeon or per PT. Patient indicated understanding. Pain:  Tolerates session well. Activity Tolerance:   Fair and requires rest breaks    After treatment patient left in no apparent distress:   Sitting in chair, Call bell within reach, and Bed / chair alarm activated    COMMUNICATION/COLLABORATION:   The patients plan of care was discussed with: Physical therapist and Registered nurse.      Afsaneh Quinn OT  Time Calculation: 25 mins

## 2022-11-16 NOTE — PROGRESS NOTES
Transition of Care Plan:    RUR: 12%  Disposition: home with 24/7 assist x2 and home health SN and PT/OT -[facility] can accept patient at home address of [address]-   If SNF or IPR: Date FOC offered:  Date FOC received: for [facility] 11/16/22  Date authorization started with reference number:  Date authorization received and expires:  Accepting facility:  Follow up appointments:PCP/Specialist  DME needed: none- has rolling walker, scooter bsc and getting w/c on 21st from [facility] at Discharge:will need assist with transport home stretcher 1/2 step to enter and one story home- placed on will call with [facility] for 11/17/22  Keys or means to access home: wife       IM Medicare Letter:delivered   Is patient a [place] and connected with the [place]? yes  If yes, was [employer] transfer form completed and VA notified? No pt being discharged today- pt has medicare coverage  Caregiver Contact: Ha[place] Man wife [phone]  Discharge Caregiver contacted prior to discharge? yes  Care Conference needed?:      no    CM spoke with wife about options of rehab/SNF vs home health - declined  rehab/SNF - patient has assist of wife, grandson and aide service through [facility] - discussed [address] for home care and wife unable to locate agency they previously used and only wants agency that can service their area- CM sent referrals to 4 agencies in their area - referrals pending at this time. Wife/family to requests assist with transport to home and declines assist with transport at this time. KWAKU Anguiano             Care Management Interventions  PCP Verified by CM:  Yes  Transition of Care Consult (CM Consult): [address]: No  Reason Outside [place]: Out of service area  Discharge Durable Medical Equipment: No  Physical Therapy Consult: Yes  Occupational Therapy Consult: Yes  Speech Therapy Consult: No  Support Systems: Spouse/Significant Other  Confirm Follow Up Transport: Family  Discharge Location  Patient Expects to be Discharged to[de-identified] Home with home health

## 2022-11-17 VITALS
HEART RATE: 74 BPM | OXYGEN SATURATION: 93 % | TEMPERATURE: 97.9 F | WEIGHT: 241.18 LBS | BODY MASS INDEX: 27.91 KG/M2 | RESPIRATION RATE: 21 BRPM | SYSTOLIC BLOOD PRESSURE: 118 MMHG | HEIGHT: 78 IN | DIASTOLIC BLOOD PRESSURE: 73 MMHG

## 2022-11-17 LAB
ANION GAP SERPL CALC-SCNC: 8 MMOL/L (ref 5–15)
BASOPHILS # BLD: 0.1 K/UL (ref 0–0.1)
BASOPHILS NFR BLD: 1 % (ref 0–1)
BUN SERPL-MCNC: 28 MG/DL (ref 6–20)
BUN/CREAT SERPL: 28 (ref 12–20)
CALCIUM SERPL-MCNC: 9.3 MG/DL (ref 8.5–10.1)
CHLORIDE SERPL-SCNC: 104 MMOL/L (ref 97–108)
CO2 SERPL-SCNC: 26 MMOL/L (ref 21–32)
CREAT SERPL-MCNC: 0.99 MG/DL (ref 0.7–1.3)
DIFFERENTIAL METHOD BLD: ABNORMAL
EOSINOPHIL # BLD: 0.4 K/UL (ref 0–0.4)
EOSINOPHIL NFR BLD: 4 % (ref 0–7)
ERYTHROCYTE [DISTWIDTH] IN BLOOD BY AUTOMATED COUNT: 13.4 % (ref 11.5–14.5)
GLUCOSE BLD STRIP.AUTO-MCNC: 109 MG/DL (ref 65–117)
GLUCOSE SERPL-MCNC: 109 MG/DL (ref 65–100)
HCT VFR BLD AUTO: 37.6 % (ref 36.6–50.3)
HGB BLD-MCNC: 12.4 G/DL (ref 12.1–17)
IMM GRANULOCYTES # BLD AUTO: 0.1 K/UL (ref 0–0.04)
IMM GRANULOCYTES NFR BLD AUTO: 1 % (ref 0–0.5)
LYMPHOCYTES # BLD: 1.6 K/UL (ref 0.8–3.5)
LYMPHOCYTES NFR BLD: 13 % (ref 12–49)
MCH RBC QN AUTO: 33.2 PG (ref 26–34)
MCHC RBC AUTO-ENTMCNC: 33 G/DL (ref 30–36.5)
MCV RBC AUTO: 100.5 FL (ref 80–99)
MONOCYTES # BLD: 1.3 K/UL (ref 0–1)
MONOCYTES NFR BLD: 11 % (ref 5–13)
NEUTS SEG # BLD: 8.4 K/UL (ref 1.8–8)
NEUTS SEG NFR BLD: 70 % (ref 32–75)
NRBC # BLD: 0 K/UL (ref 0–0.01)
NRBC BLD-RTO: 0 PER 100 WBC
PHOSPHATE SERPL-MCNC: 3.8 MG/DL (ref 2.6–4.7)
PLATELET # BLD AUTO: 270 K/UL (ref 150–400)
PMV BLD AUTO: 10.7 FL (ref 8.9–12.9)
POTASSIUM SERPL-SCNC: 4.7 MMOL/L (ref 3.5–5.1)
RBC # BLD AUTO: 3.74 M/UL (ref 4.1–5.7)
SERVICE CMNT-IMP: NORMAL
SODIUM SERPL-SCNC: 138 MMOL/L (ref 136–145)
WBC # BLD AUTO: 11.9 K/UL (ref 4.1–11.1)

## 2022-11-17 PROCEDURE — 36415 COLL VENOUS BLD VENIPUNCTURE: CPT

## 2022-11-17 PROCEDURE — 74011250637 HC RX REV CODE- 250/637: Performed by: ORTHOPAEDIC SURGERY

## 2022-11-17 PROCEDURE — 82962 GLUCOSE BLOOD TEST: CPT

## 2022-11-17 PROCEDURE — 97110 THERAPEUTIC EXERCISES: CPT | Performed by: PHYSICAL THERAPIST

## 2022-11-17 PROCEDURE — 80048 BASIC METABOLIC PNL TOTAL CA: CPT

## 2022-11-17 PROCEDURE — 84100 ASSAY OF PHOSPHORUS: CPT

## 2022-11-17 PROCEDURE — 74011000250 HC RX REV CODE- 250: Performed by: ORTHOPAEDIC SURGERY

## 2022-11-17 PROCEDURE — 74011250637 HC RX REV CODE- 250/637: Performed by: INTERNAL MEDICINE

## 2022-11-17 PROCEDURE — 97116 GAIT TRAINING THERAPY: CPT | Performed by: PHYSICAL THERAPIST

## 2022-11-17 PROCEDURE — 85025 COMPLETE CBC W/AUTO DIFF WBC: CPT

## 2022-11-17 PROCEDURE — 97535 SELF CARE MNGMENT TRAINING: CPT

## 2022-11-17 PROCEDURE — 74011250637 HC RX REV CODE- 250/637: Performed by: GENERAL ACUTE CARE HOSPITAL

## 2022-11-17 RX ORDER — AMIODARONE HYDROCHLORIDE 400 MG/1
400 TABLET ORAL DAILY
Qty: 30 TABLET | Refills: 2 | Status: SHIPPED | OUTPATIENT
Start: 2022-11-17 | End: 2023-02-15

## 2022-11-17 RX ADMIN — RIVAROXABAN 15 MG: 15 TABLET, FILM COATED ORAL at 08:38

## 2022-11-17 RX ADMIN — OXYCODONE 5 MG: 5 TABLET ORAL at 08:38

## 2022-11-17 RX ADMIN — POTASSIUM CHLORIDE 10 MEQ: 750 TABLET, FILM COATED, EXTENDED RELEASE ORAL at 08:43

## 2022-11-17 RX ADMIN — METOPROLOL SUCCINATE 25 MG: 25 TABLET, FILM COATED, EXTENDED RELEASE ORAL at 08:38

## 2022-11-17 RX ADMIN — SODIUM CHLORIDE, PRESERVATIVE FREE 10 ML: 5 INJECTION INTRAVENOUS at 05:15

## 2022-11-17 RX ADMIN — Medication 1 AMPULE: at 08:40

## 2022-11-17 RX ADMIN — SACUBITRIL AND VALSARTAN 1 TABLET: 24; 26 TABLET, FILM COATED ORAL at 08:39

## 2022-11-17 RX ADMIN — POLYETHYLENE GLYCOL 3350 17 G: 17 POWDER, FOR SOLUTION ORAL at 08:39

## 2022-11-17 RX ADMIN — DULOXETINE 30 MG: 30 CAPSULE, DELAYED RELEASE ORAL at 08:39

## 2022-11-17 RX ADMIN — FAMOTIDINE 20 MG: 20 TABLET, FILM COATED ORAL at 08:38

## 2022-11-17 RX ADMIN — SENNOSIDES AND DOCUSATE SODIUM 2 TABLET: 50; 8.6 TABLET ORAL at 08:38

## 2022-11-17 RX ADMIN — ACETAMINOPHEN 1000 MG: 500 TABLET ORAL at 05:14

## 2022-11-17 RX ADMIN — FUROSEMIDE 60 MG: 40 TABLET ORAL at 08:39

## 2022-11-17 RX ADMIN — AMIODARONE HYDROCHLORIDE 400 MG: 200 TABLET ORAL at 05:14

## 2022-11-17 NOTE — PROGRESS NOTES
1900: Bedside shift change report given to AVRIL Galvin (oncoming nurse) by Yareli Guzman RN (offgoing nurse). Report included the following information SBAR, Kardex, ED Summary, Intake/Output, MAR, Recent Results, and Cardiac Rhythm AV paced . 2000: Assessment completed. See flowsheet for details. 2130: Assisted patient to self cath. Total urine 600 mL. 0000: Reassessment completed. 0200: Assisted patient to self cath. Total urine 700 mL.     0400: Reassessment completed. 0700: Bedside shift change report given to Yareli Guzman RN (oncoming nurse) by AVRIL Galvin (offgoing nurse). Report included the following information SBAR, Kardex, Intake/Output, MAR, and Recent Results.

## 2022-11-17 NOTE — PROGRESS NOTES
Transition of Care Plan:     RUR: 12%  Disposition: home with 24/7 assist x2 and home health SN and PT/OT -[facility] can accept patient at home address of [address]-   If SNF or IPR: Date FOC offered:  Date FOC received: for [facility] 11/16/22  Date authorization started with reference number:  Date authorization received and expires:  Accepting facility:  Follow up appointments:PCP/Specialist  DME needed: none- has rolling walker, scooter bsc and getting w/c on 21st from [facility] at Discharge:will need assist with transport home stretcher 1/2 step to enter and one story home- [facility] to  at 12:15 pm and family will be home to receive patient  Keys or means to access home: wife       IM Medicare Letter:delivered   Is patient a [place] and connected with the [address] [facility] ? yes  If yes, was [employer] transfer form completed and VA notified? No pt being discharged today- pt has medicare coverage  Caregiver Contact: Zunilda [place] wife [phone]  Discharge Caregiver contacted prior to discharge? yes  Care Conference needed?:      no    [facility] phone [phone]  And pickup time at 12:15 pm.  Nursing made aware.   KWAKU Juarez

## 2022-11-17 NOTE — PROGRESS NOTES
Problem: Mobility Impaired (Adult and Pediatric)  Goal: *Acute Goals and Plan of Care (Insert Text)  Description: FUNCTIONAL STATUS PRIOR TO ADMISSION: Patient poor historian providing inconsistent reports of PLOF. Appears he was ambulating in home using walking stick? ??, but has RW. Attempting to obtain scooter for community mobility. HOME SUPPORT PRIOR TO ADMISSION: The patient lived with family and required assistance for ADLs. Physical Therapy Goals  Initiated 11/14/2022  1. Patient will move from supine to sit and sit to supine , scoot up and down, and roll side to side in bed with minimal assistance/contact guard assist within 7 day(s). 2.  Patient will transfer from bed to chair and chair to bed with moderate assistance  using the least restrictive device within 7 day(s). 3.  Patient will perform sit to stand with moderate assistance  within 7 day(s). 4.  Patient will ambulate with moderate assistance  for 10 feet with the least restrictive device within 7 day(s). 5.  Patient will ascend/descend 4 stairs with 2 handrail(s) with moderate assistance  within 7 day(s). Outcome: Progressing Towards Goal   PHYSICAL THERAPY TREATMENT  Patient: Kaia Chung (10 y.o. male)  Date: 11/17/2022  Diagnosis: Right hip pain [M25.551]  Closed right hip fracture ([address] [S72.001A] <principal problem not specified>  Procedure(s) (LRB):  RIGHT HIP ARTHROPLASTY (Right) 4 Days Post-Op  Precautions:    Chart, physical therapy assessment, plan of care and goals were reviewed. ASSESSMENT  Patient continues with skilled PT services and is progressing towards goals. Patient  continues to demonstrate improvements with overall mobility and balance. He requires additional time for all mobility and to complete HEP. Patient able to complete bed mobility, transfers and short distance ambulation in room with CGA. Patient only agreeable to ambulate 25 feet in room.  No overt LOB noted and patient demonstrating improved ability to weight shift to R and bear weight through RLE. Continue to recommend rehab following discharge however family declines rehab and plans for patient to return to home, therefore patient will benefit from [address] Will also need 24 hour supervision and assistance for all mobility. .     Current Level of Function Impacting Discharge (mobility/balance): CGA and additional time           PLAN :  Patient continues to benefit from skilled intervention to address the above impairments. Continue treatment per established plan of care. to address goals. Recommendation for discharge: (in order for the patient to meet his/her long term goals)  rehab    This discharge recommendation:  Has been made in collaboration with the attending provider and/or case management    IF patient discharges home will need the following DME: patient owns DME required for discharge       SUBJECTIVE:   Patient stated I'm going home today.     OBJECTIVE DATA SUMMARY:   Critical Behavior:  Neurologic State: Alert, Appropriate for age  Orientation Level: Oriented X4  Cognition: Follows commands, Appropriate for age attention/concentration  Safety/Judgement: Awareness of environment, Fall prevention, Home safety  Functional Mobility Training:  Bed Mobility:     Supine to Sit: Contact guard assistance; Additional time (use of bed rails)  Sit to Supine: Contact guard assistance; Additional time  Scooting: Stand-by assistance        Transfers:  Sit to Stand: Contact guard assistance; Additional time  Stand to Sit: Contact guard assistance                             Balance:  Sitting: Intact  Sitting - Static: Good (unsupported)  Sitting - Dynamic: Good (unsupported)  Standing - Static: Good  Standing - Dynamic : Good;Constant support  Ambulation/Gait Training:  Distance (ft): 25 Feet (ft)  Assistive Device: Walker, rolling;Gait belt  Ambulation - Level of Assistance: Contact guard assistance        Gait Abnormalities: Antalgic;Decreased step clearance        Base of Support: Widened;Shift to left (minimally)     Speed/Shelley: Pace decreased (<100 feet/min); Slow  Step Length: Left shortened;Right shortened         Gait is slow and mildly unsteady but no overt LOB noted.  Improve weight shift to R noted today                Therapeutic Exercises:   SUPINE  EXERCISES   Sets   Reps   Active Active Assist   Passive Self ROM   Comments   Ankle Pumps 1 5 [x]                                        []                                        []                                        []                                           Quad Sets 1 5 [x]                                        []                                        []                                        []                                           Heel Slides 1 5 [x]                                        []                                        []                                        []                                           Hip Abduction 1 5 []                                        [x]                                        []                                        []                                           Glut Sets 1 5 [x]                                        []                                        []                                        []                                              []                                        []                                        []                                        []                                              []                                        []                                        []                                        []                                                   Activity Tolerance:   Fair, Poor, and requires rest breaks      After treatment patient left in no apparent distress:   Supine in bed, Call bell within reach, and Bed / chair alarm activated    COMMUNICATION/COLLABORATION:   The patients plan of care was discussed with: Occupational therapist and Registered nurse.      Kylee Alvarez, PT   Time Calculation: 23 mins

## 2022-11-17 NOTE — DISCHARGE INSTRUCTIONS
DISCHARGE DIAGNOSIS:  Recurrent non sustained V tach. Right hip Fx. Nonocclusive DVT left lower extremity  Leukocytosis  ? New onset DM  Leukocytosis  Likely reactive    MEDICATIONS:  It is important that you take the medication exactly as they are prescribed. Keep your medication in the bottles provided by the pharmacist and keep a list of the medication names, dosages, and times to be taken in your wallet. Do not take other medications without consulting your doctor. Pain Management: per above medications    What to do at Home    Recommended diet:  Diabetic Diet    Recommended activity: Activity as tolerated    If you have questions regarding the hospital related prescriptions or hospital related issues please call [address] [place] [address] at . You can always direct your questions to your primary care doctor if you are unable to reach your hospital physician; your PCP works as an extension of your hospital doctor just like your hospital doctor is an extension of your PCP for your time at the hospital [facility]). If you experience any of the following symptoms then please call your primary care physician or return to the emergency room if you cannot get hold of your doctor:  Fever, chills, nausea, vomiting, diarrhea, change in mentation, falling, bleeding, shortness of breath    Nutrition Tips for Diabetes: After Your Visit  Your Care Instructions  A healthy diet is important to manage diabetes. It helps you lose weight (if you need to) and keep it off. It gives you the nutrition and energy your body needs and helps prevent heart disease. But a diet for diabetes does not mean that you have to eat special foods. You can eat what your family eats, including occasional sweets and other favorites. But you do have to pay attention to how often you eat and how much you eat of certain foods. The right plan for you will give you meals that help you keep your blood sugar at healthy levels.   Try to eat a variety of foods and to spread carbohydrate throughout the day. Carbohydrate raises blood sugar higher and more quickly than any other nutrient does. Carbohydrate is found in sugar, breads and cereals, fruit, starchy vegetables such as potatoes and corn, and milk and yogurt. You may want to work with a dietitian or diabetes educator to help you plan meals and snacks. A dietitian or diabetes educator also can help you lose weight if that is one of your goals. The following tips can help you enjoy your meals and stay healthy. Follow-up care is a key part of your treatment and safety. Be sure to make and go to all appointments, and call your doctor if you are having problems. Its also a good idea to know your test results and keep a list of the medicines you take. How can you care for yourself at home? Learn which foods have carbohydrate and how much carbohydrate to eat. A dietitian or diabetes educator can help you learn to keep track of how much carbohydrate you eat. Spread carbohydrate throughout the day. Eat some carbohydrate at all meals, but do not eat too much at any one time. Plan meals to include food from all the food groups. These are the food groups and some example portion sizes:  Grains: 1 slice of bread (1 ounce), ½ cup of cooked cereal, and 1/3 cup of cooked pasta or rice. These have about 15 grams of carbohydrate in a serving. Choose whole grains such as whole wheat bread or crackers, oatmeal, and brown rice more often than refined grains. Fruit: 1 small fresh fruit, such as an apple or orange; ½ of a banana; ½ cup of chopped, cooked, or canned fruit; ½ cup of fruit juice; 1 cup of melon or raspberries; and 2 tablespoons of dried fruit. These have about 15 grams of carbohydrate in a serving. Dairy: 1 cup of nonfat or low-fat milk and 2/3 cup of plain yogurt. These have about 15 grams of carbohydrate in a serving.   Protein foods: Beef, chicken, turkey, fish, eggs, tofu, cheese, cottage cheese, and peanut butter. A serving size of meat is 3 ounces, which is about the size of a deck of cards. Examples of meat substitute serving sizes (equal to 1 ounce of meat) are 1/4 cup of cottage cheese, 1 egg, 1 tablespoon of peanut butter, and ½ cup of tofu. These have very little or no carbohydrate per serving. Vegetables: Starchy vegetables such as ½ cup of cooked dried beans, peas, potatoes, or corn have about 15 grams of carbohydrate. Nonstarchy vegetables have very little carbohydrate, such as 1 cup of raw leafy vegetables (such as spinach), ½ cup of other vegetables (cooked or chopped), and 3/4 cup of vegetable juice. Use the plate format to plan meals. It is a good, quick way to make sure that you have a balanced meal. It also helps you spread carbohydrate throughout the day. You divide your plate by types of foods. Put vegetables on half the plate, meat or meat substitutes on one-quarter of the plate, and a grain or starchy vegetable (such as brown rice or a potato) in the final quarter of the plate. To this you can add a small piece of fruit and 1 cup of milk or yogurt, depending on how much carbohydrate you are supposed to eat at a meal.  Talk to your dietitian or diabetes educator about ways to add limited amounts of sweets into your meal plan. You can eat these foods now and then, as long as you include the amount of carbohydrate they have in your daily carbohydrate allowance. If you drink alcohol, limit it to no more than 1 drink a day for women and 2 drinks a day for men. If you are pregnant, no amount of alcohol is known to be safe. Protein, fat, and fiber do not raise blood sugar as much as carbohydrate does. If you eat a lot of these nutrients in a meal, your blood sugar will rise more slowly than it would otherwise. Limit saturated fats, such as those from meat and dairy products. Try to replace it with monounsaturated fat, such as olive oil.  This is a healthier choice because people who have diabetes are at higher-than-average risk of heart disease. But use a modest amount of olive oil. A tablespoon of olive oil has 14 grams of fat and 120 calories. Exercise lowers blood sugar. If you take insulin by shots or pump, you can use less than you would if you were not exercising. Keep in mind that timing matters. If you exercise within 1 hour after a meal, your body may need less insulin for that meal than it would if you exercised 3 hours after the meal. Test your blood sugar to find out how exercise affects your need for insulin. Exercise on most days of the week. Aim for at least 30 minutes. Exercise helps you stay at a healthy weight and helps your body use insulin. Walking is an easy way to get exercise. Gradually increase the amount you walk every day. You also may want to swim, bike, or do other activities. When you eat out  Learn to estimate the serving sizes of foods that have carbohydrate. If you measure food at home, it will be easier to estimate the amount in a serving of restaurant food. If the meal you order has too much carbohydrate (such as potatoes, corn, or baked beans), ask to have a low-carbohydrate food instead. Ask for a salad or green vegetables. If you use insulin, check your blood sugar before and after eating out to help you plan how much to eat in the future. If you eat more carbohydrate at a meal than you had planned, take a walk or do other exercise. This will help lower your blood sugar. Where can you learn more? Go to [employer]  Enter G671 in the search box to learn more about \"Nutrition Tips for Diabetes: After Your Visit. \"   © 8742-1983 [employer], Incorporated. Care instructions adapted under license by [employer] (which disclaims liability or warranty for this information).  This care instruction is for use with your licensed healthcare professional. If you have questions about a medical condition or this instruction, always ask your healthcare professional. [place] [address] any warranty or liability for your use of this information.   Content Version: 82.1.437544; Current as of: June 4, 2014

## 2022-11-17 NOTE — DISCHARGE SUMMARY
Hospitalist Discharge Summary     Patient ID:  Eloy Grimes  [id number]  68 y.o.  1945 11/9/2022    PCP on record: Rian Soria MD    Admit date: 11/9/2022  Discharge date and time: 11/17/2022    DISCHARGE DIAGNOSIS:  Recurrent non sustained V tach. Right hip Fx. Nonocclusive DVT left lower extremity  Leukocytosis  ? New onset DM  Leukocytosis  Likely reactive      CONSULTATIONS:  IP CONSULT TO ORTHOPEDIC SURGERY  IP CONSULT TO ORTHOPEDIC SURGERY  IP CONSULT TO CARDIOLOGY  IP CONSULT TO HEMATOLOGY    Excerpted HPI from H&P of Amarilis Kendall MD:  CHIEF COMPLAINT: Right hip pain     HISTORY OF PRESENT ILLNESS:     Teddy Dubois is a 68 y.o.   male who presents with past medical history of congestive heart failure s/p AICD, complete heart block s/p pacemaker, hypertension is coming the hospital chief complaints of fall and also right hip pain. Patient reports pain is about 7 x 10, increases with weightbearing and also with activity and without any relieving factors. He also reports some trauma to the right side of head during the time of fall but does not report any headache. Does not report any lightheadedness, dizziness or passing out. Does not report any chest pain or shortness of breath. No fever or chills. On arrival to ED, noted to have stable vitals. On labs CBC is normal.  BMP shows a potassium of 3.3, creatinine of 0.88. LFTs are normal.  CT head shows no acute process. X-ray of right hip shows questionable right femur fracture and CT is pending     We were asked to admit for work up and evaluation of the above problems. ______________________________________________________________________  DISCHARGE SUMMARY/HOSPITAL COURSE:  for full details see H&P, daily progress notes, labs, consult notes. Recurrent non sustained V tach.   -Hip surgery deferred due to dysrhythmia after intubation.  -Continue amiodarone, switch drip to  TID, on DC lower dose to 400 mg daily  [facility]  -S/p LH on 11/10 was unremarkable. -EP consult appreciated. Pt may needs DFT in a month  -[facility]eds [facility] after [facility]     Right hip Fx.  -He was going to get hip surgery but canceled after intubation due to ongoing dysrhythmia.  -s/p Right total hip arthroplasty for femoral neck fracture on 11/13  -PT OT recs SNF ,patient and family decided to go home with home health  -Asa BID for DVT ppx, switch to Xarelto due to DVT     Nonocclusive DVT left lower extremity  Leukocytosis  Pt was found to have a nonocclusive DVT on the left femoral vein  Started on Xarelto, appreciate heme-onc input  Leukocytosis can be due to fracture versus DVT  Chronic systolic HF  Continue diuresis as tolerated. Resume entresto      Prediabeetes   HBA1c 6, no need for antidiabetic meds , s/p lantus while inpatient      Leukocytosis  Likely reactive  Wbc trending down, UA negative chest x-ray showed no pneumonia  Venous  Doppler showed nonocclusive left lower extremity DVT          _______________________________________________________________________  Patient seen and examined by me on discharge day. Pertinent Findings:  Gen:    Not in distress  Chest: Clear lungs  CVS:   Regular rhythm. No edema  Abd:  Soft, not distended, not tender  Neuro:  Alert, ox3  _______________________________________________________________________  DISCHARGE MEDICATIONS:   Current Discharge Medication List        START taking these medications    Details   amiodarone (PACERONE) 400 mg tablet Take 1 Tablet by mouth daily for 90 days. Qty: 30 Tablet, Refills: 2  Start date: 11/17/2022, End date: 2/15/2023      !! rivaroxaban (XARELTO) 15 mg tab tablet Take 1 Tablet by mouth two (2) times daily (with meals) for 40 doses. Qty: 40 Tablet, Refills: 0  Start date: 11/17/2022, End date: 12/7/2022      !! rivaroxaban (XARELTO) 20 mg tab tablet Take 1 Tablet by mouth daily (with dinner) for 90 days.  Start from 12/07 after completion of xarelto 15mg bid  Qty: 30 Tablet, Refills: 2  Start date: 12/7/2022, End date: 3/7/2023       !! - Potential duplicate medications found. Please discuss with provider. CONTINUE these medications which have NOT CHANGED    Details   cholecalciferol, vitamin D3, 50 mcg (2,000 unit) tab Take 1 Tablet by mouth.      metoprolol succinate (TOPROL-XL) 25 mg XL tablet Take 25 mg by mouth daily. DULoxetine (CYMBALTA) 30 mg capsule Take 30 mg by mouth two (2) times a day. sacubitril-valsartan (ENTRESTO) 49 mg/51 mg tablet Take 1 Tablet by mouth every twelve (12) hours. furosemide (LASIX) 40 mg tablet Take 60 mg by mouth daily. OTHER Please provide patient with catheters listed below: Catheter is: [facility], Male, FR 14/4.7mm. Please fax to [facility]  Qty: 120 Each, Refills: 0    Associated Diagnoses: Urinary retention      niacin ER (NIASPAN) 750 mg Tb24 Take 1,500 mg by mouth nightly. potassium chloride SR (KLOR-CON 10) 10 mEq tablet Take 10 mEq by mouth daily. cetirizine (ZYRTEC) 10 mg tablet Take 10 mg by mouth nightly. calcium polycarbophiL (Fiber Laxative, ca polycarbo,) 625 mg tablet Take 1,250 mg by mouth four (4) times daily. Patient Follow Up Instructions:    Activity: Activity as tolerated  Diet: Diabetic Diet  Wound Care: None needed      Follow-up Information       Follow up With Specialties Details Why Contact Info    Jason Palencia MD Internal Medicine Physician Follow up  [address]  [phone]      [address] [facility] [phone] skilled nursing and [address]  [address] [address]  [phone]          ________________________________________________________________    Risk of deterioration: High    Condition at Discharge: Stable  __________________________________________________________________    Disposition  Home with family and home health services    ____________________________________________________________________    Code Status: Full Code  ___________________________________________________________________      Total time in minutes spent coordinating this discharge (includes going over instructions, follow-up, prescriptions, and preparing report for sign off to her PCP) :  >30 minutes    Signed:  Laura Clement MD

## 2022-11-17 NOTE — PROGRESS NOTES
Problem: Self Care Deficits Care Plan (Adult)  Goal: *Acute Goals and Plan of Care (Insert Text)  Description: FUNCTIONAL STATUS PRIOR TO ADMISSION: Patient is a questionable historian. Per report, uses 6 ft walking stick in the home and RW when outside the home. Also reports being in the process of obtaining power wheelchair. Patient with history of falls. HOME SUPPORT: Per report, patient requires spouse assistance for lower body ADLs and bathing (min-mod A per patient description). Occupational Therapy Goals  Initiated 11/14/2022  1. Patient will perform lower body ADLs with AE and moderate assistance within 7 day(s). 2.  Patient will perform seated upper body ADLs with supervision/set-up within 7 day(s). 3.  Patient will tolerate static standing with RW support and moderate assistance in prep for standing ADLs within 7 day(s). 4.  Patient will perform toilet transfer to [facility] with moderate assistance within 7 day(s). 5.  Patient will perform all aspects of toileting with moderate assistance within 7 day(s). 6.  Patient will participate in upper extremity therapeutic exercise/activities with supervision/set-up for 10 minutes within 7 day(s). 7.  Patient will utilize energy conservation and fall prevention techniques during functional activities without cues within 7 day(s). Outcome: Progressing Towards Goal   OCCUPATIONAL THERAPY TREATMENT  Patient: Nick Palma (40 y.o. male)  Date: 11/17/2022  Diagnosis: Right hip pain [M25.551]  Closed right hip fracture ([address] [S72.001A] <principal problem not specified>  Procedure(s) (LRB):  RIGHT HIP ARTHROPLASTY (Right) 4 Days Post-Op  Precautions:    Chart, occupational therapy assessment, plan of care, and goals were reviewed. ASSESSMENT  Patient continues with skilled OT services and is progressing towards goals. Patient is received resting in bed, reporting he had already been up x2 today.  Patient continues to demonstrate gains in functional balance, ambulating with RW and overall CGA. Patient performs standing grooming with equal LE weightbearing, benefiting from compensatory strategy of briefly resting hand on countertop during bimanual task. Patient continues to require total assist for distal lower body dressing, however endorses wife will likely continue to assist him and, if desired, he does own AE from prior surgery. Patient declines review of AE training at this time, stating he feels fairly comfortable. Given significant improvement in performance since initial evaluation, anticipate patient safe to discharge home with [place] and increased supervision/assist.    Current Level of Function Impacting Discharge (ADLs): CGA for fx mobility; up to total assist for distal lower body    Other factors to consider for discharge:          PLAN :  Patient continues to benefit from skilled intervention to address the above impairments. Continue treatment per established plan of care to address goals. Recommendation for discharge: (in order for the patient to meet his/her long term goals)  Occupational therapy at least 2 days/week in the home AND ensure assist and/or supervision for safety with ADLs and IADLs    This discharge recommendation:  Has been made in collaboration with the attending provider and/or case management    IF patient discharges home will need the following DME: patient owns DME required for discharge       SUBJECTIVE:   Patient stated I think back to bed, I didn't get much sleep last night.     OBJECTIVE DATA SUMMARY:   Cognitive/Behavioral Status:  Neurologic State: Alert; Appropriate for age  Orientation Level: Oriented X4  Cognition: Follows commands; Appropriate for age attention/concentration  Perception: Appears intact  Perseveration: No perseveration noted  Safety/Judgement: Awareness of environment; Fall prevention;Home safety    Functional Mobility and Transfers for ADLs:  Bed Mobility:  Supine to Sit: Contact guard assistance; Additional time (use of bed rails)  Sit to Supine: Contact guard assistance; Additional time  Scooting: Stand-by assistance    Transfers:  Sit to Stand: Contact guard assistance; Additional time  Functional Transfers  Bathroom Mobility: Contact guard assistance     Balance:  Sitting: Intact  Sitting - Static: Good (unsupported)  Sitting - Dynamic: Good (unsupported)  Standing - Static: Good  Standing - Dynamic : Good;Constant support    ADL Intervention:  Grooming  Grooming Assistance: Stand-by assistance;Contact guard assistance  Position Performed: Standing  Washing Face: Contact guard assistance  Brushing Teeth: Contact guard assistance  Cues: Verbal cues provided  Adaptive Equipment:  (RW)    Lower Body Dressing Assistance  Dressing Assistance: Total assistance(dependent)  Socks: Total assistance (dependent)  Slip on Shoes with Back: Total assistance (dependent)  Position Performed: Seated edge of bed  Cues: Verbal cues provided;Physical assistance    Cognitive Retraining  Safety/Judgement: Awareness of environment; Fall prevention;Home safety    Precautions: Patient recalled and demonstrated avoiding extreme planes of movement with Right LE during ADLs and functional mobility with no cues. Bathing: Patient instructed when bathing to not submerge wound in water, stand to shower or sponge bathe, cover wound with plastic and tape to ensure no water reaches bandage/wound without cues. Patient indicated understanding. Dressing joint: Patient instructed to don all clothing while sitting prior to standing, doff all clothing to knees while standing, then sit to doff clothing off from knees to feet to facilitate fall prevention, pain management, and energy conservation. Dressing joint reach exercise:  To increase independence with lower body dressing, patient instructed and demonstrated to reach down Bilateral LE in a seated position slowly to prevent tearing/shearing until slight pull is felt, hold at end range for 10 seconds, then return to starting upright position with Supervision. Patient instructed to complete three sets of three repetitions each daily. Home safety: Patient instructed on home modifications and safety (raise height of ADL objects, appropriate height of chair surfaces, recliner safety, change of floor surfaces, clear pathways) to increase independence and fall prevention. Patient indicated understanding. Standing: Patient instructed and demonstrated to walk up to sink/countertop/surfaces, step into walker to increase safety of joint and fall prevention with Contact guard assistance. Instructed to apply concept of hip contraindications to ADLs within the home (no extreme reaching across body, square off while using objects, slide objects along surfaces). Patient instructed to increase amount of time standing, observe standing position during ADLs to increase even weight bearing through bilateral LEs to increase independence with ADLs. Goal to be reached 30 days post - op, per orthopedic surgeon or per PT. Patient indicated understanding. Tub transfer: Patient instructed regarding when it is safe to begin transfer into tub (complete stairs with PT, advance exercises with PT high enough to clear tub height). Patient instructed to use the same technique as used with stairs when entering and exiting tub (\"up with the non-surgical, down with the surgical leg\"). Patient indicated understanding  Pain:  No reports. Activity Tolerance:   Good, Fair, and requires rest breaks    After treatment patient left in no apparent distress:   Supine in bed, Call bell within reach, Bed / chair alarm activated, and Side rails x 3    COMMUNICATION/COLLABORATION:   The patients plan of care was discussed with: Physical therapist and Registered nurse.      Yossi Gupta OT  Time Calculation: 16 mins

## 2022-11-17 NOTE — DIABETES MGMT
Discharge Recommendations    This 68year old white male required insulin during this complicated hospital stay. As explained to him yesterday, he is definitely at risk for developing diabetes, and should employ the prevention strategies of 7% weight loss and 150 minutes of physical activity per week to address this. A1c 6%. No diabetes-related medications are needed at discharge.     Aki Brunner DNP, RN, ACNS-BC, BC-ADM, [facility]  Clinical Nurse Specialist-Diabetes & Endocrine disorders    [facility] (In-patient CNS consult service)  [phone]

## 2022-11-17 NOTE — PROGRESS NOTES
Bedside shift change report given to Celso Valdez (oncoming nurse) by Polina Staples (offgoing nurse). Report included the following information SBAR, Kardex, Intake/Output, MAR, Recent Results, Cardiac Rhythm AV paced, and Alarm Parameters .

## 2022-11-22 NOTE — PROGRESS NOTES
Physician Progress Note      Romana Cheek  CSN #:                  [id number]  :                       1945  ADMIT DATE:       2022 2:51 PM  [address] [place] DATE:        2022 12:20 PM  RESPONDING  PROVIDER #:        Verónica Fisher MD          QUERY TEXT:    Dr Miguelangel Morley  Pt admitted with fracture of hip. Noted documentation of NSTEMI, mild trop elevation with LHC showing no CAD by Cardiac consultant. If possible, please document in progress notes and discharge summary:    The medical record reflects the following:  Risk Factors: Presents with Fall/fracture of hip, HTN, XOL, ICD in place  Clinical Indicators: Trop high [id number], NSVT and frequent PVCs while undergoing Hip surgery  Treatment: LHC done on 11/10 with no CAD  Options provided:  -- NSTEMI ruled out  -- NSTEMI confirmed present on admission  -- NSTEMI confirmed not present on admission  -- Defer to Cardiac consultant documentation regarding NSTEMI  -- Other - I will add my own diagnosis  -- Disagree - Not applicable / Not valid  -- Disagree - Clinically unable to determine / Unknown  -- Refer to Clinical Documentation Reviewer    PROVIDER RESPONSE TEXT:    The diagnosis of NSTEMI was ruled out. Query created by: Ros Knee on 2022 12:48 PM      QUERY TEXT:    Dr Miguelangel Morley  Pt admitted with fracture of hip. Noted documentation of Vfib during [facility] by Cardiac consultant. If possible, please document in progress notes and discharge summary:    The medical record reflects the following:  Risk Factors: Presents with Fall/fracture of hip, HTN, XOL, ICD in place  Clinical Indicators: Trop high [id number], NSVT and frequent PVCs while undergoing Hip surgery;   note states: early recurrence of VF which may have been triggered by PVC early after BIV pacing post-shock which led to prompt external shock.  Regarding the initial VF during cath, this may have been precipitated by quick contrast-induced changes, decreased oxygenation in the microvasculature during injection, pressure stretch of myocardium with injection, transient QT dispersion, etc;   It was NOT due to R-on-T mechanism or device-triggered AT FIRST. The second episode of recurrent VF may have been R-on-T related phenomenon after review of the strips as described above  Treatment: LHC done on 11/10 with no CAD  Options provided:  -- Vfib during LHC is clinically significant confirmed not present on admission  -- Vfib during LHC not clinically significant  -- Defer to Cardiac consultant documentation regarding Vfib  -- Other - I will add my own diagnosis  -- Disagree - Not applicable / Not valid  -- Disagree - Clinically unable to determine / Unknown  -- Refer to Clinical Documentation Reviewer    PROVIDER RESPONSE TEXT:    I defer to Cardiac consultant regarding documentation of Vfib. Query created by:  Doris Corley on 11/21/2022 1:15 PM      Electronically signed by:  Marlene An MD 11/22/2022 4:44 PM

## 2022-12-15 NOTE — TELEPHONE ENCOUNTER
----- Message from Jeimy Haynes sent at 12/13/2022 12:35 PM EST -----  Regarding: RE: missed [facility] appt with Dr. Gabriela De La Rosa 12/12/22  Patient was a no show to scheduled appointment. Attempted to reach, no answer. Left message requesting a return call. x2    ----- Message -----  From: Jerome Mccurdy  Sent: 12/12/2022   1:59 PM EST  To: Javier Cruz RN, Jeimy Haynes, #  Subject: missed new [facility] appt with Dr. Gabriela De La Rosa 12/12/22     Lm on patient's vm to call and r/d.

## 2022-12-16 NOTE — TELEPHONE ENCOUNTER
----- Message from Francy Kay sent at 12/16/2022  9:43 AM EST -----  Regarding: RE: missed new [facility] appt with Dr. Judith Mejía 12/12/22  Spoke with the patient 's daughter and he just got out of the hosp yesterday from heart surgery. She said he does not follow with [facility] it was just a flook because he had fallen and that is where they took him. She said his care is currently with Cardiology and they are probably following his DVT. I told her she may want to call and ask them that and to make sure it isn't a hematologist needed. She said she would but it will not be with [facility]. ----- Message -----  From: Efren Cobb  Sent: 12/13/2022  12:36 PM EST  To: Trine Blizzard, RN, #  Subject: RE: missed new [facility] appt with Dr. Judith Mejía 12/1#    Patient was a no show to scheduled appointment. Attempted to reach, no answer. Left message requesting a return call. x2    ----- Message -----  From: Shan Youssef  Sent: 12/12/2022   1:59 PM EST  To: Ca Aranda RN, Eris Garduno, #  Subject: missed new [facility] appt with Dr. Judith Mejía 12/12/22     Lm on patient's vm to call and r/d.

## 2023-03-31 NOTE — PROGRESS NOTES
1200: Consent for surgery obtained and placed in chart. 1204: Patient's troponin came back at 317. Notified Dr. Janki Ferguson, he said he will put the cardiology consult in. complains of pain/discomfort

## 2024-02-13 NOTE — DISCHARGE INSTRUCTIONS
It was a pleasure taking care of you in our Emergency Department today. We know that when you come to [facility], you are entrusting us with your health, comfort, and safety. Our physicians and nurses honor that trust, and truly appreciate the opportunity to care for you and your loved ones. We also value your feedback. If you receive a survey about your Emergency Department experience today, please fill it out. We care about our patients' feedback, and we listen to what you have to say. Thank you!       Dr. Dusty Morales MD. Constitutional: (-) fever (-)chills  (-)sweats  Eyes/ENT: (-) blurry vision (-) epistaxis  (-)rhinorrhea  (-)sore throat  Cardiovascular: (-) chest pain, (-) palpitations   Respiratory: (-) cough, (-) shortness of breath  Gastrointestinal: (-) vomiting, (-) diarrhea  (-) abdominal pain  Musculoskeletal: (-) neck pain, (-) back pain, (-) joint pain  Integumentary: (-) rash, (-) edema  Neurological: (-) headache, (-) altered mental status  (-) LOC  Psychiatric: (-) hallucinations  Allergic/Immunologic: (-) pruritus

## (undated) DEVICE — 6619 2 PTNT ISO SYS INCISE AREA&LT;(&GT;&&LT;)&GT;P: Brand: STERI-DRAPE™ IOBAN™ 2

## (undated) DEVICE — ELECTRODE BLDE L4IN NONINSULATED EDGE

## (undated) DEVICE — SUTURE STRATAFIX SZ 3-0 30CM NONABSORB UD 26MM FS 3/8 SXMP2B412

## (undated) DEVICE — TR BAND RADIAL ARTERY COMPRESSION DEVICE: Brand: TR BAND

## (undated) DEVICE — SUT VCRL 2-0 36IN CT1 UD --

## (undated) DEVICE — OSCILLATING TIP SAW CARTRIDGE: Brand: PRECISION FALCON

## (undated) DEVICE — SPLINT WR POS F/ARTERIAL ACC -- BX/10

## (undated) DEVICE — GLOVE SURG SZ 85 L12IN FNGR THK79MIL GRN LTX FREE

## (undated) DEVICE — SOLUTION IRRIG 1000ML STRL H2O USP PLAS POUR BTL

## (undated) DEVICE — DRESSING WND ISLAND STD 4X10 IN MULT LAYR STRL SILVERLON

## (undated) DEVICE — 450 ML BOTTLE OF 0.05% CHLORHEXIDINE GLUCONATE IN 99.95% STERILE WATER FOR IRRIGATION, USP AND APPLICATOR.: Brand: IRRISEPT ANTIMICROBIAL WOUND LAVAGE

## (undated) DEVICE — KIT POS FOAM HANA TBL

## (undated) DEVICE — 3M™ TEGADERM™ TRANSPARENT FILM DRESSING FRAME STYLE, 1626W, 4 IN X 4-3/4 IN (10 CM X 12 CM), 50/CT 4CT/CASE: Brand: 3M™ TEGADERM™

## (undated) DEVICE — GLOVE ORTHO 8   MSG9480

## (undated) DEVICE — TUBING PRSS MON L6IN PVC M FEM CONN

## (undated) DEVICE — DRAPE,REIN 53X77,STERILE: Brand: MEDLINE

## (undated) DEVICE — 3M™ IOBAN™ 2 ANTIMICROBIAL INCISE DRAPE 6651EZ: Brand: IOBAN™ 2

## (undated) DEVICE — DERMABOND SKIN ADH 0.7ML -- DERMABOND ADVANCED 12/BX

## (undated) DEVICE — TRANSFER SET 3": Brand: MEDLINE INDUSTRIES, INC.

## (undated) DEVICE — FLOSEAL MATRIX IS INDICATED IN SURGICAL PROCEDURES (OTHER THAN IN OPHTHALMIC) AS AN ADJUNCT TO HEMOSTASIS WHEN CONTROL OF BLEEDING BY LIGATURE OR CONVENTIONALPROCEDURES IS INEFFECTIVE OR IMPRACTICAL.: Brand: FLOSEAL HEMOSTATIC MATRIX

## (undated) DEVICE — TOTAL JOINT-MRMC: Brand: MEDLINE INDUSTRIES, INC.

## (undated) DEVICE — ALCOHOL  RUBBING  70% ISOPROPYL  4-OZ

## (undated) DEVICE — SOLUTION IRRIG 1000ML 0.9% SOD CHL USP POUR PLAS BTL

## (undated) DEVICE — PREP SKN CHLRAPRP APL 26ML STR --

## (undated) DEVICE — MARKER,SKIN,WI/RULER AND LABELS: Brand: MEDLINE

## (undated) DEVICE — Device: Brand: JELCO

## (undated) DEVICE — GUIDEWIRE VASC L260CM 0.035IN J TIP L3MM PTFE FIX COR NAMIC

## (undated) DEVICE — SUTURE ABSRB L30CM 2-0 VLT SPRL PDS + STRATAFIX SXPP1B410

## (undated) DEVICE — BNDG COMPR ESMRCH 6INX9FT LF --

## (undated) DEVICE — Z DISCONTINUED USE 2717541 SUTURE STRATAFIX SZ 3-0 L30CM NONABSORBABLE UD L26MM FS 3/8

## (undated) DEVICE — REM POLYHESIVE ADULT PATIENT RETURN ELECTRODE: Brand: VALLEYLAB

## (undated) DEVICE — DRSG POSTOP PRMSL AG 3.5X14IN

## (undated) DEVICE — RADIFOCUS OPTITORQUE ANGIOGRAPHIC CATHETER: Brand: OPTITORQUE

## (undated) DEVICE — GLIDESHEATH SLENDER ACCESS KIT: Brand: GLIDESHEATH SLENDER

## (undated) DEVICE — HOOD: Brand: FLYTE

## (undated) DEVICE — SUTURE VCRL SZ 0 L27IN ABSRB UD L36MM CT-1 1/2 CIR J260H

## (undated) DEVICE — SUTURE ABSORBABLE MONOFILAMENT 2-0 WND CLOSURE GRN V-LOC 180 VLOCL0315

## (undated) DEVICE — PIN EXT FIX SCHNZ 3 MM

## (undated) DEVICE — NDL SPNE QNCKE 18GX3.5IN LF --

## (undated) DEVICE — HI-TORQUE VERSACORE FLOPPY GUIDE WIRE SYSTEM 145 CM: Brand: HI-TORQUE VERSACORE

## (undated) DEVICE — YANKAUER,SMOOTH HANDLE,HIGH CAPACITY: Brand: MEDLINE INDUSTRIES, INC.

## (undated) DEVICE — STRYKER PERFORMANCE SERIES SAGITTAL BLADE: Brand: STRYKER PERFORMANCE SERIES

## (undated) DEVICE — CATH GUID COR EB35 5FR 100CM -- LAUNCHER

## (undated) DEVICE — GLOVE SURG SZ 8 L12IN FNGR THK79MIL GRN LTX FREE

## (undated) DEVICE — SUTURE MCRYL SZ 3-0 L27IN ABSRB UD L24MM PS-1 3/8 CIR PRIM Y936H

## (undated) DEVICE — DRESSING HYDROCOLLOID BORDER 35X10 IN ALUM PRIMASEAL

## (undated) DEVICE — PREP KIT PEEL PTCH POVIDONE IOD

## (undated) DEVICE — SNAP KOVER: Brand: UNBRANDED

## (undated) DEVICE — SYSTEM SKIN CLSR 22CM DERMBND PRINEO

## (undated) DEVICE — GOWN,SIRUS,NONRNF,SETINSLV,2XL,18/CS: Brand: MEDLINE

## (undated) DEVICE — SUTURE VCRL SZ 1 L36IN ABSRB UD L36MM CT-1 1/2 CIR J947H

## (undated) DEVICE — SPONGE GZ W4XL4IN COT 12 PLY TYP VII WVN C FLD DSGN

## (undated) DEVICE — Device

## (undated) DEVICE — SOL MEDC H PEROX 3% 16OZ -- MEDICHOICE MEDLINE USE 176231

## (undated) DEVICE — SYS SKIN CLOS 22CM -- DERMABOUND PRINEO

## (undated) DEVICE — SUTURE STRATAFIX SYMMETRIC SZ 1 L18IN ABSRB VLT CT1 L36CM SXPP1A404

## (undated) DEVICE — BLADE ELECTRODE: Brand: EDGE